# Patient Record
Sex: MALE | Race: WHITE | Employment: FULL TIME | URBAN - METROPOLITAN AREA
[De-identification: names, ages, dates, MRNs, and addresses within clinical notes are randomized per-mention and may not be internally consistent; named-entity substitution may affect disease eponyms.]

---

## 2017-01-17 ENCOUNTER — OFFICE VISIT (OUTPATIENT)
Dept: FAMILY MEDICINE CLINIC | Age: 42
End: 2017-01-17

## 2017-01-17 VITALS
OXYGEN SATURATION: 98 % | RESPIRATION RATE: 16 BRPM | TEMPERATURE: 97 F | BODY MASS INDEX: 29.7 KG/M2 | HEIGHT: 69 IN | SYSTOLIC BLOOD PRESSURE: 128 MMHG | WEIGHT: 200.5 LBS | DIASTOLIC BLOOD PRESSURE: 90 MMHG | HEART RATE: 73 BPM

## 2017-01-17 DIAGNOSIS — F41.0 PANIC ANXIETY SYNDROME: ICD-10-CM

## 2017-01-17 RX ORDER — ALPRAZOLAM 1 MG/1
TABLET ORAL
Qty: 30 TAB | Refills: 5 | Status: SHIPPED | OUTPATIENT
Start: 2017-01-17 | End: 2017-07-10 | Stop reason: SDUPTHER

## 2017-01-17 RX ORDER — BUSPIRONE HYDROCHLORIDE 30 MG/1
30 TABLET ORAL 2 TIMES DAILY
Qty: 60 TAB | Refills: 1 | Status: SHIPPED | OUTPATIENT
Start: 2017-01-17 | End: 2017-01-25 | Stop reason: SDUPTHER

## 2017-01-17 NOTE — PATIENT INSTRUCTIONS
TODAY, go to:   CHECK OUT    Please schedule the following appointments:  · Anxiety follow up with Dr. Damon Zimmerman in 8 weeks    _____________________     Today you were seen for:    continue xanax  Increase buspar as directed  _____________________     Review your health maintenance below. Make plans to return and address anything that is due or will be due soon. There are no preventive care reminders to display for this patient.

## 2017-01-17 NOTE — PROGRESS NOTES
Chief Complaint   Patient presents with    Medication Refill     Doing [address] Deepti Aguayo Anxiety is still a problem. 1. Have you been to the ER, urgent care clinic since your last visit? Hospitalized since your last visit? No    2. Have you seen or consulted any other health care providers outside of the [facility] since your last visit? Include any pap smears or colon screening. No     I have reviewed Health Maintenance with the patient and updated. Advance Care plan is on file.

## 2017-01-17 NOTE — PROGRESS NOTES
[address] Visit   Patient ID:   Arik Durán is a 35 y.o. male. Assessment/Plan:    Mehran Elizabeth was seen today for medication refill. Diagnoses and all orders for this visit:    Panic anxiety syndrome  Xanax refilled. -     ALPRAZolam (XANAX) 1 mg tablet; TAKE 1 TABLET BY MOUTH DAILY AS NEEDED FOR ANXIETY  -     busPIRone (BUSPAR) 30 mg tablet; Take 1 Tab by mouth two (2) times a day for 60 days  Anxiety is not adequately controlled with current plan    · Patient's plan currently includes: buspar. INCREASE to 30mg po bid. Xanax daily  · Functional improvements that justify chronic benzodiazepine medications: yes  · Treatment agreement was signed by pt and myself on: 1/17/17  PHQ -9 done: 1/17/176 Result: 7   ·  appropriate and last checked on: 1/17/17  · LABS:  Last Utox: due for Utox at next refill    Counselled pt on:  Patient health concerns. Patient was offered a choice/choices in the treatment plan today. Patient expresses understanding of the plan and agrees with recommendations. Patient Instructions   TODAY, go to:   CHECK OUT    Please schedule the following appointments:  · Anxiety follow up with Dr. Cruz Felder in 8 weeks    _____________________     Today you were seen for:    continue xanax  Increase buspar as directed  _____________________     Review your health maintenance below. Make plans to return and address anything that is due or will be due soon. There are no preventive care reminders to display for this patient. ?  Subjective:   HPI:  Arik Durán is a 35 y.o. male being seen for:   Chief Complaint   Patient presents with    Medication Refill     Doing [address] Deepti Aguayo Anxiety is still a problem. Anxiety  · Start buspar in Dec 2016, no better since then  · Due for xanax refill   · Work still the worst place  · Home the best place    Screening and Prevention Due:  There are no preventive care reminders to display for this patient.      Review of Systems  Otherwise, per HPI  Active Problem List:  Patient Active Problem List   Diagnosis Code    Panic anxiety syndrome F41.0    GERD (gastroesophageal reflux disease) K21.9    Pulmonary nodules/lesions, multiple R91.8    Melanoma of trunk (HCC) C43.59    Low back pain M54.5     ? Objective:     Visit Vitals    /90 (BP 1 Location: Left arm, BP Patient Position: Sitting)    Pulse 73    Temp 97 °F (36.1 °C) (Oral)    Resp 16    Ht 5' 9\" (1.753 m)    Wt 200 lb 8 oz (90.9 kg)    SpO2 98%    BMI 29.61 kg/m2     PHQ 2 / 9, over the last two weeks 1/17/2017   Little interest or pleasure in doing things Several days   Feeling down, depressed or hopeless Not at all   Total Score PHQ 2 1   Trouble falling or staying asleep, or sleeping too much Several days   Feeling tired or having little energy More than half the days   Poor appetite or overeating Not at all   Feeling bad about yourself - or that you are a failure or have let yourself or your family down Several days   Trouble concentrating on things such as school, work, reading or watching TV Several days   Moving or speaking so slowly that other people could have noticed; or the opposite being so fidgety that others notice Several days   Thoughts of being better off dead, or hurting yourself in some way Not at all   PHQ 9 Score 7     Physical Exam   Constitutional: He appears well-developed and well-nourished. No distress. Pulmonary/Chest: Effort normal.   Neurological: He is alert. Psychiatric: He has a normal mood and affect. His behavior is normal.     No Known Allergies  Prior to Admission medications    Medication Sig Start Date End Date Taking? Authorizing Provider   ALPRAZolam Valjevidya [place]) 1 mg tablet TAKE 1 TABLET BY MOUTH DAILY AS NEEDED FOR ANXIETY 1/17/17  Yes Kenyetta [place]. Marlena Voss MD   busPIRone (BUSPAR) 30 mg tablet Take 1 Tab by mouth two (2) times a day for 60 days. 1/17/17 3/18/17 Yes Kenyetta [place].  Marlena Voss MD   CARAFATE 100 mg/mL suspension Take 1 tsp by mouth as needed.  3/9/16  Yes Historical Provider   esomeprazole (NEXIUM) 40 mg capsule TAKE 1 CAPSULES BY MOUTH EVERY DAY 3/15/16  Yes Yohana Greer MD

## 2017-01-17 NOTE — MR AVS SNAPSHOT
Visit Information Date & Time Provider Department Dept. Phone Encounter #  
 1/17/2017  5:15 PM Bridgett Cooper. Octavio Lucio MD [facility] [phone] [id number] Upcoming Health Maintenance Date Due Pneumococcal 19-64 Highest Risk (1 of 3 - PCV13) 1/22/2017* DTaP/Tdap/Td series (1 - Tdap) 1/22/2017* INFLUENZA AGE 9 TO ADULT 1/22/2017* *Topic was postponed. The date shown is not the original due date. Allergies as of 1/17/2017  Review Complete On: 1/17/2017 By: Abiel Torres RN No Known Allergies Current Immunizations  Reviewed on 7/11/2016 Name Date Td 6/24/2004 Not reviewed this visit You Were Diagnosed With   
  
 Codes Comments Panic anxiety syndrome     ICD-10-CM: F41.0 ICD-9-CM: 300.01 Vitals BP Pulse Temp Resp Height(growth percentile) Weight(growth percentile) 128/90 (BP 1 Location: Left arm, BP Patient Position: Sitting) 73 97 °F (36.1 °C) (Oral) 16 5' 9\" (1.753 m) 200 lb 8 oz (90.9 kg) SpO2 BMI Smoking Status 98% 29.61 kg/m2 Former Smoker Vitals History BMI and BSA Data Body Mass Index Body Surface Area  
 29.61 kg/m 2 2.1 m 2 Preferred Pharmacy Pharmacy Name Phone [id number] [address] [phone] Your Updated Medication List  
  
   
This list is accurate as of: 1/17/17  6:14 PM.  Always use your most recent med list.  
  
  
  
  
 ALPRAZolam 1 mg tablet Commonly known as:  XANAX  
TAKE 1 TABLET BY MOUTH DAILY AS NEEDED FOR ANXIETY  
  
 busPIRone 30 mg tablet Commonly known as:  BUSPAR Take 1 Tab by mouth two (2) times a day for 60 days. CARAFATE 100 mg/mL suspension Generic drug:  sucralfate Take 1 tsp by mouth as needed. esomeprazole 40 mg capsule Commonly known as:  NexIUM  
TAKE 1 CAPSULES BY MOUTH EVERY DAY Prescriptions Printed Refills ALPRAZolam (XANAX) 1 mg tablet 5 Sig: TAKE 1 TABLET BY MOUTH DAILY AS NEEDED FOR ANXIETY Class: Print Prescriptions Sent to Pharmacy Refills  
 busPIRone (BUSPAR) 30 mg tablet 1 Sig: Take 1 Tab by mouth two (2) times a day for 60 days. Class: Normal  
 Pharmacy: [facility]/pharm[facility] #[id number] [place] [address] #: [phone] Route: Oral  
  
Patient Instructions TODAY, go to: CHECK OUT Please schedule the following appointments: · Anxiety follow up with Dr. Emerson Gloria in 8 weeks 
 
_____________________ Today you were seen for: 
 
continue xanax Increase buspar as directed 
_____________________ Review your health maintenance below. Make plans to return and address anything that is due or will be due soon. There are no preventive care reminders to display for this patient. Introducing [facility] & HEALTH SERVICES! Dear Joe Singh: 
Thank you for requesting a [employer] account. Our records indicate that you already have an active [employer] account. You can access your account anytime at [email or web address] Did you know that you can access your hospital and ER discharge instructions at any time in [employer]? You can also review all of your test results from your hospital stay or ER visit. Additional Information If you have questions, please visit the Frequently Asked Questions section of the [employer] website at [email or web address] Remember, [employer] is NOT to be used for urgent needs. For medical emergencies, dial 911. Now available from your iPhone and Android! Please provide this summary of care documentation to your next provider. Your primary care clinician is listed as Tomer Hubbard. Emerson Gloria. If you have any questions after today's visit, please call [phone].

## 2017-03-09 RX ORDER — ESOMEPRAZOLE MAGNESIUM 40 MG/1
CAPSULE, DELAYED RELEASE ORAL
Qty: 90 CAP | Refills: 0 | Status: SHIPPED | OUTPATIENT
Start: 2017-03-09 | End: 2017-07-05 | Stop reason: SDUPTHER

## 2017-06-20 ENCOUNTER — OFFICE VISIT (OUTPATIENT)
Dept: FAMILY MEDICINE CLINIC | Age: 42
End: 2017-06-20

## 2017-06-20 VITALS
HEIGHT: 69 IN | OXYGEN SATURATION: 99 % | RESPIRATION RATE: 18 BRPM | SYSTOLIC BLOOD PRESSURE: 150 MMHG | TEMPERATURE: 97.6 F | WEIGHT: 202 LBS | BODY MASS INDEX: 29.92 KG/M2 | DIASTOLIC BLOOD PRESSURE: 99 MMHG | HEART RATE: 87 BPM

## 2017-06-20 DIAGNOSIS — F41.0 PANIC ANXIETY SYNDROME: Primary | ICD-10-CM

## 2017-06-20 RX ORDER — MIRTAZAPINE 7.5 MG/1
7.5 TABLET, FILM COATED ORAL
Qty: 30 TAB | Refills: 1 | Status: SHIPPED | OUTPATIENT
Start: 2017-06-20 | End: 2017-08-29 | Stop reason: SDUPTHER

## 2017-06-20 NOTE — MR AVS SNAPSHOT
Visit Information Date & Time Provider Department Dept. Phone Encounter #  
 6/20/2017  4:20 PM [place] Records Kimberly Berger MD [facility] [phone] [id number] Upcoming Health Maintenance Date Due Pneumococcal 19-64 Highest Risk (1 of 3 - PCV13) 12/6/1992 DTaP/Tdap/Td series (1 - Tdap) 6/25/2004 INFLUENZA AGE 9 TO ADULT 8/1/2017 Allergies as of 6/20/2017  Review Complete On: 6/20/2017 By: Rita Silva LPN No Known Allergies Current Immunizations  Reviewed on 7/11/2016 Name Date Td 6/24/2004 Not reviewed this visit You Were Diagnosed With   
  
 Codes Comments Panic anxiety syndrome    -  Primary ICD-10-CM: F41.0 ICD-9-CM: 300.01 Vitals BP Pulse Temp Resp Height(growth percentile) Weight(growth percentile) (!) 150/99 (BP 1 Location: Left arm, BP Patient Position: Sitting) 87 97.6 °F (36.4 °C) (Oral) 18 5' 9\" (1.753 m) 202 lb (91.6 kg) SpO2 BMI Smoking Status 99% 29.83 kg/m2 Former Smoker BMI and BSA Data Body Mass Index Body Surface Area  
 29.83 kg/m 2 2.11 m 2 Preferred Pharmacy [facility]acy Name Phone [id number] [address] [phone] Your Updated Medication List  
  
   
This list is accurate as of: 6/20/17  5:31 PM.  Always use your most recent med list.  
  
  
  
  
 ALPRAZolam 1 mg tablet Commonly known as:  XANAX  
TAKE 1 TABLET BY MOUTH DAILY AS NEEDED FOR ANXIETY CARAFATE 100 mg/mL suspension Generic drug:  sucralfate Take 1 tsp by mouth as needed. esomeprazole 40 mg capsule Commonly known as:  NexIUM  
TAKE 1 CAPSULES BY MOUTH EVERY DAY  
  
 mirtazapine 7.5 mg tablet Commonly known as:  Sharyle Meadows Take 1 Tab by mouth nightly. Take at night. May make tired. Prescriptions Sent to Pharmacy  Refills  
 mirtazapine (REMERON) 7.5 mg tablet 1  
 Sig: Take 1 Tab by mouth nightly. Take at night. May make tired. Class: Normal  
 Pharmacy: [id number] [place], [address] [facility] Ph #: [phone] Route: Oral  
  
We Performed the Following [address] MONITORING [[id number] Custom] Patient Instructions Teddy Clark TODAY, please go to: CHECK OUT Please schedule the following appointments at CHECK OUT: 
· Anxiety and FMLA paper work with Dr. Munira Mcdaniel in 3-4 weeks 
 
_____________________ Today's Plan: 
· Stop buspar · Start remeron · Continue xanax when needed · Bring pill bottle for xanax refill · Ask your HR about FMLA paper work for intermittent leave.  
_____________________ Review your health maintenance below. Make plans to return and address anything that is due or will be due soon. Health Maintenance Due Topic Date Due  Pneumococcal Vaccine (1 of 3 - PCV13) 12/06/1992  
 DTaP/Tdap/Td  (1 - Tdap) 06/25/2004  
 
 
 
_____________________ Are you stressed out? Overwhelmed? In pain? Feeling disconnected? Consider MINDFULNESS. .. 
[email or web address] 
_____________________ If you need to get your labs done at [facility], visit this site to find a convenient location: [facility] HEALTH SERVICES! Dear Kingston Diallo: 
Thank you for requesting a [employer] account. Our records indicate that you already have an active [employer] account. You can access your account anytime at [email or web address] Did you know that you can access your hospital and ER discharge instructions at any time in [employer]? You can also review all of your test results from your hospital stay or ER visit. Additional Information If you have questions, please visit the Frequently Asked Questions section of the [employer] website at [email or web address] Textilest/. Remember, [email or web address] is NOT to be used for urgent needs. For medical emergencies, dial 911. Now available from your iPhone and Android! Please provide this summary of care documentation to your next provider. Your primary care clinician is listed as Anna Plaza. Priscilla Crystal. If you have any questions after today's visit, please call [phone].

## 2017-06-20 NOTE — PATIENT INSTRUCTIONS
.. TODAY, please go to:   CHECK OUT     Please schedule the following appointments at CHECK OUT:  · Anxiety and FMLA paper work with Dr. Polo Rodriguez in 3-4 weeks    _____________________     Today's Plan:  · Stop buspar  · Start remeron  · Continue xanax when needed  · Bring pill bottle for xanax refill  · Ask your HR about FMLA paper work for intermittent leave.   _____________________     Review your health maintenance below. Make plans to return and address anything that is due or will be due soon. Health Maintenance Due   Topic Date Due    Pneumococcal Vaccine (1 of 3 - PCV13) 12/06/1992    DTaP/Tdap/Td  (1 - Tdap) 06/25/2004         _____________________     Are you stressed out? Overwhelmed? In pain? Feeling disconnected? Consider MINDFULNESS. ..  [email or web address]  _____________________     If you need to get your labs done at [facility], visit this site to find a convenient location: [email or web address]

## 2017-06-20 NOTE — PROGRESS NOTES
Chief Complaint   Patient presents with    Anxiety     discuss anxiety, and medication      1. Have you been to the ER, urgent care clinic since your last visit? Hospitalized since your last visit? No     2. Have you seen or consulted any other health care providers outside of the [address] [place] [address] since your last visit? Include any pap smears or colon screening. No     The patient was counseled on the dangers of tobacco use, and was advised never to start again. Reviewed strategies to maximize success, including never to start again. Health Maintenance Due   Topic Date Due    Pneumococcal 19-64 Highest Risk (1 of 3 - PCV13) Discussed with patient today and advised to follow up. Patient declines. 12/06/1992    DTaP/Tdap/Td series (1 - Tdap) Discussed with patient today and advised to follow up. Patient declines. 06/25/2004     ACP is not on file, advised to return.

## 2017-06-20 NOTE — PROGRESS NOTES
[address] Visit   Patient ID:   Mariano Temple is a 35 y.o. male. Assessment/Plan:    Shefali Peralta was seen today for anxiety. Diagnoses and all orders for this visit:    Panic anxiety syndrome  Increased anxiety with buspar. Trial remeron, start at night. If not effective consider TCA or MAOI. Discussed prn propranolol, but would have to know when he is going to stressed and sometimes this is unpredictable. Comments:  has not responded to SSRIs, SNRIs, buspar. currently needing chronic xanax. Orders:  -     COMPLIANCE DRUG SCREEN/PRESCRIPTION MONITORING  -     mirtazapine (REMERON) 7.5 mg tablet; Take 1 Tab by mouth nightly. Take at night. May make tired. Next visit: utox, fmla papers, xanax refill    Counselled pt on:  Patient health concerns, plan as outlined in patient instructions and above. Patient was offered a choice/choices in the treatment plan today. Patient expresses understanding of the plan and agrees with recommendations. Patient Instructions     . Belinda Rivera TODAY, please go to:   CHECK OUT     Please schedule the following appointments at CHECK OUT:  · Anxiety and FMLA paper work with Dr. Garth Pagan in 3-4 weeks    _____________________     Today's Plan:  · Stop buspar  · Start remeron  · Continue xanax when needed  · Bring pill bottle for xanax refill  · Ask your HR about FMLA paper work for intermittent leave.   _____________________     Review your health maintenance below. Make plans to return and address anything that is due or will be due soon. Health Maintenance Due   Topic Date Due    Pneumococcal Vaccine (1 of 3 - PCV13) 12/06/1992    DTaP/Tdap/Td  (1 - Tdap) 06/25/2004         _____________________     Are you stressed out? Overwhelmed? In pain? Feeling disconnected? Consider MINDFULNESS. ..  [email or web address]  _____________________     If you need to get your labs done at [facility], visit this site to find a convenient location: [email or web address]      Subjective:   HPI:  Hilda Yañez is a 35 y.o. male being seen for:   Chief Complaint   Patient presents with    Anxiety     discuss anxiety, and medication        Anxiety  · The longer he took buspar, the worse he got  · Couldn't go anywhere by himself  · Last took a couple weeks ago, not just a little better. · Xanax, using daily for morning meetings 4 days a week. · Worried that he he going to loose job, has 6 kids  · This has come and go over the years     Review of Systems  Otherwise as noted in HPI    Active Problem List:  Patient Active Problem List   Diagnosis Code    Panic anxiety syndrome F41.0    GERD (gastroesophageal reflux disease) K21.9    Pulmonary nodules/lesions, multiple R91.8    Melanoma of trunk (HCC) C43.59    Low back pain M54.5     ?   Objective:     Visit Vitals    BP (!) 150/99 (BP 1 Location: Left arm, BP Patient Position: Sitting)    Pulse 87    Temp 97.6 °F (36.4 °C) (Oral)    Resp 18    Ht 5' 9\" (1.753 m)    Wt 202 lb (91.6 kg)    SpO2 99%    BMI 29.83 kg/m2     Wt Readings from Last 3 Encounters:   06/20/17 202 lb (91.6 kg)   01/17/17 200 lb 8 oz (90.9 kg)   11/28/16 203 lb 6.4 oz (92.3 kg)     BP Readings from Last 3 Encounters:   06/20/17 (!) 150/99   01/17/17 128/90   11/28/16 (!) 154/99     PHQ over the last two weeks 6/20/2017   Little interest or pleasure in doing things Not at all   Feeling down, depressed or hopeless Not at all   Total Score PHQ 2 0   Trouble falling or staying asleep, or sleeping too much -   Feeling tired or having little energy -   Poor appetite or overeating -   Feeling bad about yourself - or that you are a failure or have let yourself or your family down -   Trouble concentrating on things such as school, work, reading or watching TV -   Moving or speaking so slowly that other people could have noticed; or the opposite being so fidgety that others notice -   Thoughts of being better off dead, or hurting yourself in some way -   PHQ 9 Score -       Physical Exam   Constitutional: He appears well-developed and well-nourished. No distress. Pulmonary/Chest: Effort normal. No respiratory distress. Neurological: He is alert. Psychiatric: His speech is normal and behavior is normal. His mood appears anxious. No Known Allergies  Prior to Admission medications    Medication Sig Start Date End Date Taking? Authorizing Provider   mirtazapine (REMERON) 7.5 mg tablet Take 1 Tab by mouth nightly. Take at night. May make tired. 6/20/17  Yes Meagan Hicks. Munira Mcdaniel MD   esomeprazole (NEXIUM) 40 mg capsule TAKE 1 CAPSULES BY MOUTH EVERY DAY 3/9/17  Yes Meagan Hicks. Munira Mcdaniel MD   ALPRAZolam Fredy Blum) 1 mg tablet TAKE 1 TABLET BY MOUTH DAILY AS NEEDED FOR ANXIETY 1/17/17  Yes Meagan Hicks. Munira Mcdaniel MD   CARAFATE 100 mg/mL suspension Take 1 tsp by mouth as needed.  3/9/16  Yes Historical Provider

## 2017-07-05 RX ORDER — ESOMEPRAZOLE MAGNESIUM 40 MG/1
CAPSULE, DELAYED RELEASE ORAL
Qty: 90 CAP | Refills: 0 | Status: SHIPPED | OUTPATIENT
Start: 2017-07-05 | End: 2017-11-06 | Stop reason: SDUPTHER

## 2017-07-10 ENCOUNTER — OFFICE VISIT (OUTPATIENT)
Dept: FAMILY MEDICINE CLINIC | Age: 42
End: 2017-07-10

## 2017-07-10 VITALS
WEIGHT: 198 LBS | TEMPERATURE: 98.7 F | SYSTOLIC BLOOD PRESSURE: 130 MMHG | BODY MASS INDEX: 29.33 KG/M2 | DIASTOLIC BLOOD PRESSURE: 90 MMHG | HEIGHT: 69 IN | RESPIRATION RATE: 20 BRPM | OXYGEN SATURATION: 100 % | HEART RATE: 78 BPM

## 2017-07-10 DIAGNOSIS — F41.0 PANIC ANXIETY SYNDROME: Primary | ICD-10-CM

## 2017-07-10 RX ORDER — ALPRAZOLAM 1 MG/1
TABLET ORAL
Qty: 30 TAB | Refills: 5 | Status: SHIPPED | OUTPATIENT
Start: 2017-07-10 | End: 2018-01-15 | Stop reason: SDUPTHER

## 2017-07-10 NOTE — PROGRESS NOTES
.. [address] Visit   Patient ID:   Abisai Lao is a 35 y.o. male. Assessment/Plan:    Diagnoses and all orders for this visit:    1. Panic anxiety syndrome  -     ALPRAZolam (XANAX) 1 mg tablet; TAKE 1 TABLET BY MOUTH DAILY AS NEEDED FOR ANXIETY     checked and appropriate today. He has an updated treatment agreement. He understands r/o BDZ use. Use is warranted for improved functioning at this time. FMLA completed d/t panic attacks    Counselled pt on:  Patient health concerns, plan as outlined in patient instructions and above. Patient was offered a choice/choices in the treatment plan today. Patient expresses understanding of the plan and agrees with recommendations. Patient Instructions     TODAY, please go to:   CHECK OUT     Please schedule the following appointments at [facility] OUT:  · Anxiety follow up in 3 months with Dr. Mervat Perry    _____________________     Today's Plan:  · Continue remeron  · Let us know if we need to fax your papers. We have retained a copy. _____________________     Review your health maintenance below. Make plans to return and address anything that is due or will be due soon. Health Maintenance Due   Topic Date Due    Pneumococcal Vaccine (1 of 3 - PCV13) 12/06/1992    DTaP/Tdap/Td  (1 - Tdap) 06/25/2004         _____________________     Are you stressed out? Overwhelmed? In pain? Feeling disconnected? Consider MINDFULNESS. ..  [email or web address]  _____________________     If you need to get your labs done at [facility], visit this site to find a convenient location: [email or web address]      Subjective:   HPI:  Abisai Lao is a 35 y.o. male being seen for:   Chief Complaint   Patient presents with    Medication Evaluation     alprazolam last controlled substance contract signed on 1/17/17     Anxiety  · Started remeron about 2 weeks ago, no different  · Worse anxiety today and being in the office, getting BP checked     Review of Systems  Otherwise as noted in HPI    Active Problem List:  Patient Active Problem List   Diagnosis Code    Panic anxiety syndrome F41.0    GERD (gastroesophageal reflux disease) K21.9    Pulmonary nodules/lesions, multiple R91.8    Melanoma of trunk (HCC) C43.59    Low back pain M54.5     ? Objective:     Visit Vitals    /90    Pulse 78    Temp 98.7 °F (37.1 °C) (Oral)    Resp 20    Ht 5' 9\" (1.753 m)    Wt 198 lb (89.8 kg)    SpO2 100%    BMI 29.24 kg/m2     Wt Readings from Last 3 Encounters:   07/10/17 198 lb (89.8 kg)   06/20/17 202 lb (91.6 kg)   01/17/17 200 lb 8 oz (90.9 kg)     BP Readings from Last 3 Encounters:   07/10/17 130/90   06/20/17 (!) 150/99   01/17/17 128/90     PHQ over the last two weeks 7/10/2017   Little interest or pleasure in doing things Not at all   Feeling down, depressed or hopeless Not at all   Total Score PHQ 2 0   Trouble falling or staying asleep, or sleeping too much -   Feeling tired or having little energy -   Poor appetite or overeating -   Feeling bad about yourself - or that you are a failure or have let yourself or your family down -   Trouble concentrating on things such as school, work, reading or watching TV -   Moving or speaking so slowly that other people could have noticed; or the opposite being so fidgety that others notice -   Thoughts of being better off dead, or hurting yourself in some way -   PHQ 9 Score -         Physical Exam   Constitutional: He appears well-developed and well-nourished. No distress. Pulmonary/Chest: Effort normal. No respiratory distress. Neurological: He is alert. Psychiatric: He has a normal mood and affect. His behavior is normal.     No Known Allergies  Prior to Admission medications    Medication Sig Start Date End Date Taking?  Authorizing Provider   Blue Moore) 1 mg tablet TAKE 1 TABLET BY MOUTH DAILY AS NEEDED FOR ANXIETY 7/10/17  Yes Gaston Bowers MD   esomeprazole (NEXIUM) 40 mg capsule TAKE 1 CAPSULES BY MOUTH EVERY DAY 7/5/17  Yes Jenniffer Irvin. Gerhard Bowers MD   mirtazapine (REMERON) 7.5 mg tablet Take 1 Tab by mouth nightly. Take at night. May make tired. 6/20/17  Yes Jenniffer Irvin. Gerhard Bowers MD   CARAFATE 100 mg/mL suspension Take 1 tsp by mouth as needed.  3/9/16  Yes Historical Provider

## 2017-07-10 NOTE — MR AVS SNAPSHOT
Visit Information Date & Time Provider Department Dept. Phone Encounter #  
 7/10/2017  4:40 PM Pedro Landon Vieyra MD [facility] [phone] [id number] Upcoming Health Maintenance Date Due Pneumococcal 19-64 Highest Risk (1 of 3 - PCV13) 12/6/1992 DTaP/Tdap/Td series (1 - Tdap) 6/25/2004 INFLUENZA AGE 9 TO ADULT 8/1/2017 Allergies as of 7/10/2017  Review Complete On: 7/10/2017 By: Baldemar Bearden No Known Allergies Current Immunizations  Reviewed on 7/11/2016 Name Date Td 6/24/2004 Not reviewed this visit Vitals BP Pulse Temp Resp Height(growth percentile) Weight(growth percentile) (!) 156/104 (BP 1 Location: Left arm, BP Patient Position: Sitting) (!) 118 98.7 °F (37.1 °C) (Oral) 20 5' 9\" (1.753 m) 198 lb (89.8 kg) SpO2 BMI Smoking Status 100% 29.24 kg/m2 Former Smoker BMI and BSA Data Body Mass Index Body Surface Area  
 29.24 kg/m 2 2.09 m 2 Preferred Pharmacy Pharmacy Name Phone [id number] [address] [place] [address] [phone] Your Updated Medication List  
  
   
This list is accurate as of: 7/10/17  5:42 PM.  Always use your most recent med list.  
  
  
  
  
 ALPRAZolam 1 mg tablet Commonly known as:  XANAX  
TAKE 1 TABLET BY MOUTH DAILY AS NEEDED FOR ANXIETY CARAFATE 100 mg/mL suspension Generic drug:  sucralfate Take 1 tsp by mouth as needed. esomeprazole 40 mg capsule Commonly known as:  NEXIUM  
TAKE 1 CAPSULES BY MOUTH EVERY DAY  
  
 mirtazapine 7.5 mg tablet Commonly known as:  Emmy Colome Take 1 Tab by mouth nightly. Take at night. May make tired. Patient Instructions TODAY, please go to: CHECK OUT Please schedule the following appointments at [facility] OUT: 
· Anxiety follow up in 3 months with Dr. Cami Vieyra 
 
_____________________ Today's Plan: 
· Continue remeron · Let us know if we need to fax your papers. We have retained a copy. _____________________ Review your health maintenance below. Make plans to return and address anything that is due or will be due soon. Health Maintenance Due Topic Date Due  Pneumococcal Vaccine (1 of 3 - PCV13) 12/06/1992  
 DTaP/Tdap/Td  (1 - Tdap) 06/25/2004  
 
 
 
_____________________ Are you stressed out? Overwhelmed? In pain? Feeling disconnected? Consider MINDFULNESS. .. 
[email or web address] 
_____________________ If you need to get your labs done at [facility], visit this site to find a convenient location: [facility]. Introducing [address]! Dear Viktor Herrera: 
Thank you for requesting a [employer] account. Our records indicate that you already have an active [employer] account. You can access your account anytime at [email or web address] Did you know that you can access your hospital and ER discharge instructions at any time in [employer]? You can also review all of your test results from your hospital stay or ER visit. Additional Information If you have questions, please visit the Frequently Asked Questions section of the [employer] website at [email or web address] Remember, [employer] is NOT to be used for urgent needs. For medical emergencies, dial 911. Now available from your iPhone and Android! Please provide this summary of care documentation to your next provider. Your primary care clinician is listed as Virgie Neri. If you have any questions after today's visit, please call [phone].

## 2017-07-10 NOTE — PATIENT INSTRUCTIONS
TODAY, please go to:   CHECK OUT     Please schedule the following appointments at [facility] OUT:  · Anxiety follow up in 3 months with Dr. Charels Zhang    _____________________     Today's Plan:  · Continue remeron  · Let us know if we need to fax your papers. We have retained a copy. _____________________     Review your health maintenance below. Make plans to return and address anything that is due or will be due soon. Health Maintenance Due   Topic Date Due    Pneumococcal Vaccine (1 of 3 - PCV13) 12/06/1992    DTaP/Tdap/Td  (1 - Tdap) 06/25/2004         _____________________     Are you stressed out? Overwhelmed? In pain? Feeling disconnected? Consider MINDFULNESS. ..  [email or web address]  _____________________     If you need to get your labs done at [facility], visit this site to find a convenient location: [email or web address]

## 2017-07-10 NOTE — PROGRESS NOTES
Blood pressure rechecked and it was better at 130/90 in the left arm with manual cuff--heart rate down to 78

## 2017-07-10 NOTE — PROGRESS NOTES
Chief Complaint   Patient presents with    Medication Evaluation     alprazolam last controlled substance contract signed on 17     Discuss FMLA papers    PILL COUNT  Today's Date: 7/10/2017  Medication name: alprazolam  Pill strength: 1 mg  Date prescription filled: 17  # of pills prescribed: 30  Last dose of medication taken, per patient: 17  # of pills remainin  Counted in front of patient. yes  Medication returned to patient. yes    Health Maintenance Due   Topic Date Due    Pneumococcal 19-64 Highest Risk (1 of 3 - PCV13) 1992    DTaP/Tdap/Td series (1 - Tdap) 2004     Coordination of Care Questions    1. Have you been to the ER, urgent care clinic since your last visit? No       Hospitalized since your last visit? No    2. Have you seen or consulted any other health care providers outside of the [address] [place] [address] since your last visit? Include any pap smears or colon screening.  No

## 2017-08-29 DIAGNOSIS — F41.0 PANIC ANXIETY SYNDROME: ICD-10-CM

## 2017-09-06 RX ORDER — MIRTAZAPINE 7.5 MG/1
TABLET, FILM COATED ORAL
Qty: 30 TAB | Refills: 5 | Status: SHIPPED | OUTPATIENT
Start: 2017-09-06 | End: 2018-01-15

## 2017-10-17 ENCOUNTER — OFFICE VISIT (OUTPATIENT)
Dept: FAMILY MEDICINE CLINIC | Age: 42
End: 2017-10-17

## 2017-10-17 VITALS
HEIGHT: 69 IN | OXYGEN SATURATION: 100 % | HEART RATE: 88 BPM | SYSTOLIC BLOOD PRESSURE: 136 MMHG | DIASTOLIC BLOOD PRESSURE: 96 MMHG | RESPIRATION RATE: 16 BRPM | WEIGHT: 193 LBS | BODY MASS INDEX: 28.58 KG/M2 | TEMPERATURE: 97.2 F

## 2017-10-17 DIAGNOSIS — Z00.00 LABORATORY EXAM ORDERED AS PART OF ROUTINE GENERAL MEDICAL EXAMINATION: ICD-10-CM

## 2017-10-17 DIAGNOSIS — R36.1 HEMATOSPERMIA: Primary | ICD-10-CM

## 2017-10-17 DIAGNOSIS — Z00.8 ENCOUNTER FOR BIOMETRIC SCREENING: ICD-10-CM

## 2017-10-17 LAB
BILIRUB UR QL STRIP: NEGATIVE
GLUCOSE UR-MCNC: NEGATIVE MG/DL
KETONES P FAST UR STRIP-MCNC: NEGATIVE MG/DL
PH UR STRIP: 7 [PH] (ref 4.6–8)
PROT UR QL STRIP: NEGATIVE MG/DL
SP GR UR STRIP: 1.02 (ref 1–1.03)
UA UROBILINOGEN AMB POC: NORMAL (ref 0.2–1)
URINALYSIS CLARITY POC: CLEAR
URINALYSIS COLOR POC: NORMAL
URINE BLOOD POC: NORMAL
URINE LEUKOCYTES POC: NEGATIVE
URINE NITRITES POC: NEGATIVE

## 2017-10-17 NOTE — PROGRESS NOTES
Chief Complaint   Patient presents with    Form Completion     Biometric Screening form      1. Have you been to the ER, urgent care clinic since your last visit? Hospitalized since your last visit? No     2. Have you seen or consulted any other health care providers outside of the [address] [place] [address] since your last visit? Include any pap smears or colon screening. No     The patient was counseled on the dangers of tobacco use, and was advised never to start. Reviewed strategies to maximize success, including never to start. Health Maintenance Due   Topic Date Due    Pneumococcal 19-64 Highest Risk (1 of 3 - PCV13) Discussed with patient today and advised to follow up.    12/06/1992    DTaP/Tdap/Td series (1 - Tdap) Discussed with patient today and advised to follow up.    06/25/2004    INFLUENZA AGE 9 TO ADULT Discussed with patient today and advised to follow up. Patient declines. 08/01/2017     ACP is not on file, advised to return.

## 2017-10-17 NOTE — PROGRESS NOTES
[address] [place] [address] Visit   10/17/2017  Patient ID: Arik Durán is a 35 y.o. male. Assessment/Plan:    Diagnoses and all orders for this visit:    1. Hematospermia  Test as ordered. If negative and sx are unchanged treat for possible prostatitis vs urology referral  -     CULTURE, URINE  -     AMB POC URINALYSIS DIP STICK MANUAL W/O MICRO  -     CHLAMYDIA/GC PCR  -     CT/NG/T.VAGINALIS AMPLIFICATION  -     AMB POC URINALYSIS DIP STICK AUTO W/O MICRO    2. Encounter for biometric screening    3. Laboratory exam ordered as part of routine general medical examination  -     CBC W/O DIFF  -     METABOLIC PANEL, COMPREHENSIVE  -     HEMOGLOBIN A1C WITH EAG  -     LIPID PANEL    Other orders  -     CVD REPORT        Counselled pt on Patient health concerns and plans. Patient was offered a choice/choices in the treatment plan today. Reviewed return precautions as appropriate. Patient expresses understanding of the plan and agrees with recommendations. See patient instructions for more. Patient Instructions   TODAY, please go to:     CHECK OUT    LAB    Please schedule the following appointments at [address] [place] [address]:  · Complete Physical Exam and lab follow up with Dr. Cruz Felder in [facility]:  - likely benign    - will wait for urine tests and then update you. The tests can take several days to a week. - let me know if symptoms are worsening         Subjective:   HPI:  Arik Durán is a 35 y.o. male being seen for:   Chief Complaint   Patient presents with    Form Completion     Biometric Screening form        · blood in semen  · Started about a week ago  · No pain with urination, some light pressure. · After using bathroom, some clots in urine  · Urine more yellow, still drinking same amount of water. · Hematospermia notes in [address] from 3/2009. He has an old empty bottle of doxycycline with him that he was prescribed in 2004 when this happened also. No OSR available.  Recalls seeing a specialist at that time. · Needs biometric form done     Review of Systems  Otherwise as noted in HPI  ? Objective:     Visit Vitals    BP (!) 136/96 (BP 1 Location: Right arm, BP Patient Position: Sitting)    Pulse 88    Temp 97.2 °F (36.2 °C) (Oral)    Resp 16    Ht 5' 9\" (1.753 m)    Wt 193 lb (87.5 kg)    SpO2 100%    BMI 28.5 kg/m2     Wt Readings from Last 3 Encounters:   10/17/17 193 lb (87.5 kg)   07/10/17 198 lb (89.8 kg)   06/20/17 202 lb (91.6 kg)     BP Readings from Last 3 Encounters:   10/17/17 (!) 136/96   07/10/17 130/90   06/20/17 (!) 150/99     PHQ over the last two weeks 10/17/2017   Little interest or pleasure in doing things Not at all   Feeling down, depressed or hopeless Not at all   Total Score PHQ 2 0   Trouble falling or staying asleep, or sleeping too much -   Feeling tired or having little energy -   Poor appetite or overeating -   Feeling bad about yourself - or that you are a failure or have let yourself or your family down -   Trouble concentrating on things such as school, work, reading or watching TV -   Moving or speaking so slowly that other people could have noticed; or the opposite being so fidgety that others notice -   Thoughts of being better off dead, or hurting yourself in some way -   PHQ 9 Score -     Physical Exam   Constitutional: He appears well-developed and well-nourished. No distress. Pulmonary/Chest: Effort normal. No respiratory distress. Genitourinary: Prostate normal. Prostate is not enlarged and not tender. Right testis shows no mass, no swelling and no tenderness. Cremasteric reflex is not absent on the right side. Left testis shows no mass, no swelling and no tenderness. Cremasteric reflex is not absent on the left side. Neurological: He is alert. Psychiatric: He has a normal mood and affect. His behavior is normal.       No Known Allergies  Prior to Admission medications    Medication Sig Start Date End Date Taking?  Authorizing Provider   mirtazapine (REMERON) 7.5 mg tablet TAKE 1 TAB BY MOUTH NIGHTLY. TAKE AT NIGHT. MAY MAKE TIRED. 9/6/17  Yes Ariel Olsen. Lavinia Kehr, MD   ALPRAZolam Ed Jetty) 1 mg tablet TAKE 1 TABLET BY MOUTH DAILY AS NEEDED FOR ANXIETY 7/10/17  Yes Ariel Olsen. Lavinia Kehr, MD   esomeprazole (NEXIUM) 40 mg capsule TAKE 1 CAPSULES BY MOUTH EVERY DAY 7/5/17  Yes Ariel Olsen. Lavinia Kehr, MD   CARAFATE 100 mg/mL suspension Take 1 tsp by mouth as needed.  3/9/16   Historical Provider     Patient Active Problem List   Diagnosis Code    Panic anxiety syndrome F41.0    GERD (gastroesophageal reflux disease) K21.9    Pulmonary nodules/lesions, multiple R91.8    Melanoma of trunk (HCC) C43.59    Low back pain M54.5

## 2017-10-17 NOTE — MR AVS SNAPSHOT
Visit Information Date & Time Provider Department Dept. Phone Encounter #  
 10/17/2017 10:20 AM Radha Brennan. Nita Calixto MD [facility] [phone] [id number] Upcoming Health Maintenance Date Due Pneumococcal 19-64 Highest Risk (1 of 3 - PCV13) 12/6/1992 DTaP/Tdap/Td series (1 - Tdap) 6/25/2004 Allergies as of 10/17/2017  Review Complete On: 10/17/2017 By: Sterling Seo LPN No Known Allergies Current Immunizations  Reviewed on 7/11/2016 Name Date Td 6/24/2004 Not reviewed this visit You Were Diagnosed With   
  
 Codes Comments Hematospermia    -  Primary ICD-10-CM: R36.1 ICD-9-CM: 608.82 Encounter for biometric screening     ICD-10-CM: Z01.89 ICD-9-CM: V72.85 Laboratory exam ordered as part of routine general medical examination     ICD-10-CM: Z00.00 ICD-9-CM: V72.62 Vitals BP Pulse Temp Resp Height(growth percentile) Weight(growth percentile) (!) 136/96 (BP 1 Location: Right arm, BP Patient Position: Sitting) 88 97.2 °F (36.2 °C) (Oral) 16 5' 9\" (1.753 m) 193 lb (87.5 kg) SpO2 BMI Smoking Status 100% 28.5 kg/m2 Former Smoker Vitals History BMI and BSA Data Body Mass Index Body Surface Area 28.5 kg/m 2 2.06 m 2 Preferred Pharmacy Pharmacy Name Phone [facility]/PHARMACY #[id number] [phone] Your Updated Medication List  
  
   
This list is accurate as of: 10/17/17 11:19 AM.  Always use your most recent med list.  
  
  
  
  
 ALPRAZolam 1 mg tablet Commonly known as:  XANAX  
TAKE 1 TABLET BY MOUTH DAILY AS NEEDED FOR ANXIETY CARAFATE 100 mg/mL suspension Generic drug:  sucralfate Take 1 tsp by mouth as needed. esomeprazole 40 mg capsule Commonly known as:  NEXIUM  
TAKE 1 CAPSULES BY MOUTH EVERY DAY  
  
 mirtazapine 7.5 mg tablet Commonly known as:  Milady Shan  
 TAKE 1 TAB BY MOUTH NIGHTLY. TAKE AT NIGHT. MAY MAKE TIRED. We Performed the Following AMB POC URINALYSIS DIP STICK MANUAL W/O MICRO [02777 CPT(R)] CBC W/O DIFF [75866 CPT(R)] CHLAMYDIA/GC PCR [92210 CPT(R)] CULTURE, URINE [id number] CPT(R)] HEMOGLOBIN A1C WITH EAG [70082 CPT(R)] LIPID PANEL [73960 CPT(R)] METABOLIC PANEL, COMPREHENSIVE [60339 CPT(R)] To-Do List   
 10/17/2017 Lab:  T VAGINALIS AMPLIFICATION Patient Instructions TODAY, please go to: CHECK OUT  
 LAB Please schedule the following appointments at [address] [place] [address]: 
· Complete Physical Exam and lab follow up with Dr. Maria L Hobbs in [facility] Today's Plan: 
- likely benign  
 - will wait for urine tests and then update you. The tests can take several days to a week. - let me know if symptoms are worsening Introducing [facility]! Dear Yaron Blake: 
Thank you for requesting a [employer] account. Our records indicate that you already have an active [employer] account. You can access your account anytime at [email or web address] Did you know that you can access your hospital and ER discharge instructions at any time in [employer]? You can also review all of your test results from your hospital stay or ER visit. Additional Information If you have questions, please visit the Frequently Asked Questions section of the [employer] website at [email or web address] Remember, [employer] is NOT to be used for urgent needs. For medical emergencies, dial 911. Now available from your iPhone and Android! Please provide this summary of care documentation to your next provider. Your primary care clinician is listed as Kimber Hidalgo. If you have any questions after today's visit, please call [phone].

## 2017-10-18 LAB
ALBUMIN SERPL-MCNC: 4.4 G/DL (ref 3.5–5.5)
ALBUMIN/GLOB SERPL: 2 {RATIO} (ref 1.2–2.2)
ALP SERPL-CCNC: 71 IU/L (ref 39–117)
ALT SERPL-CCNC: 65 IU/L (ref 0–44)
AST SERPL-CCNC: 35 IU/L (ref 0–40)
BACTERIA UR CULT: NO GROWTH
BILIRUB SERPL-MCNC: 0.5 MG/DL (ref 0–1.2)
BUN SERPL-MCNC: 8 MG/DL (ref 6–24)
BUN/CREAT SERPL: 11 (ref 9–20)
C TRACH RRNA SPEC QL NAA+PROBE: NEGATIVE
CALCIUM SERPL-MCNC: 9.3 MG/DL (ref 8.7–10.2)
CHLORIDE SERPL-SCNC: 104 MMOL/L (ref 96–106)
CHOLEST SERPL-MCNC: 155 MG/DL (ref 100–199)
CO2 SERPL-SCNC: 27 MMOL/L (ref 18–29)
CREAT SERPL-MCNC: 0.76 MG/DL (ref 0.76–1.27)
ERYTHROCYTE [DISTWIDTH] IN BLOOD BY AUTOMATED COUNT: 12.9 % (ref 12.3–15.4)
EST. AVERAGE GLUCOSE BLD GHB EST-MCNC: 91 MG/DL
GLOBULIN SER CALC-MCNC: 2.2 G/DL (ref 1.5–4.5)
GLUCOSE SERPL-MCNC: 96 MG/DL (ref 65–99)
HBA1C MFR BLD: 4.8 % (ref 4.8–5.6)
HCT VFR BLD AUTO: 43 % (ref 37.5–51)
HDLC SERPL-MCNC: 38 MG/DL
HGB BLD-MCNC: 14.4 G/DL (ref 12.6–17.7)
INTERPRETATION, 910389: NORMAL
LDLC SERPL CALC-MCNC: 101 MG/DL (ref 0–99)
MCH RBC QN AUTO: 31.7 PG (ref 26.6–33)
MCHC RBC AUTO-ENTMCNC: 33.5 G/DL (ref 31.5–35.7)
MCV RBC AUTO: 95 FL (ref 79–97)
N GONORRHOEA RRNA SPEC QL NAA+PROBE: NEGATIVE
PLATELET # BLD AUTO: 196 X10E3/UL (ref 150–379)
POTASSIUM SERPL-SCNC: 4.2 MMOL/L (ref 3.5–5.2)
PROT SERPL-MCNC: 6.6 G/DL (ref 6–8.5)
RBC # BLD AUTO: 4.54 X10E6/UL (ref 4.14–5.8)
SODIUM SERPL-SCNC: 144 MMOL/L (ref 134–144)
TRIGL SERPL-MCNC: 79 MG/DL (ref 0–149)
VLDLC SERPL CALC-MCNC: 16 MG/DL (ref 5–40)
WBC # BLD AUTO: 4.4 X10E3/UL (ref 3.4–10.8)

## 2017-10-19 NOTE — PROGRESS NOTES
Your gonorrhea and chlamydia tests are negative. You do not have a UTI. Are you still having blood in your semen?     Dr. Brandon Ordaz

## 2017-10-23 ENCOUNTER — TELEPHONE (OUTPATIENT)
Dept: FAMILY MEDICINE CLINIC | Age: 42
End: 2017-10-23

## 2017-10-24 NOTE — TELEPHONE ENCOUNTER
Spoke with patient I.D. X 2, patient was concerned about recent lab results. Patient was informed that once Dr. Malaika Piedra gives me the ok to release those results that I would do so. Patient verbalized understanding.

## 2017-10-31 ENCOUNTER — HOSPITAL ENCOUNTER (OUTPATIENT)
Dept: CT IMAGING | Age: 42
Discharge: HOME | End: 2017-10-31
Attending: FAMILY MEDICINE
Payer: COMMERCIAL

## 2017-10-31 ENCOUNTER — OFFICE VISIT (OUTPATIENT)
Dept: FAMILY MEDICINE CLINIC | Age: 42
End: 2017-10-31

## 2017-10-31 VITALS
HEIGHT: 69 IN | SYSTOLIC BLOOD PRESSURE: 134 MMHG | RESPIRATION RATE: 20 BRPM | TEMPERATURE: 98.3 F | OXYGEN SATURATION: 98 % | WEIGHT: 199.1 LBS | DIASTOLIC BLOOD PRESSURE: 76 MMHG | HEART RATE: 66 BPM | BODY MASS INDEX: 29.49 KG/M2

## 2017-10-31 DIAGNOSIS — R31.0 GROSS HEMATURIA: ICD-10-CM

## 2017-10-31 DIAGNOSIS — R36.1 HEMATOSPERMIA: ICD-10-CM

## 2017-10-31 DIAGNOSIS — R10.9 FLANK PAIN: Primary | ICD-10-CM

## 2017-10-31 DIAGNOSIS — R10.9 RIGHT FLANK PAIN: ICD-10-CM

## 2017-10-31 DIAGNOSIS — R10.9 RIGHT FLANK PAIN: Primary | ICD-10-CM

## 2017-10-31 LAB
BASOPHILS # BLD AUTO: 0 X10E3/UL (ref 0–0.2)
BASOPHILS NFR BLD AUTO: 0 %
BUN SERPL-MCNC: 5 MG/DL (ref 6–24)
BUN/CREAT SERPL: 6 (ref 9–20)
CALCIUM SERPL-MCNC: 9.9 MG/DL (ref 8.7–10.2)
CHLORIDE SERPL-SCNC: 103 MMOL/L (ref 96–106)
CO2 SERPL-SCNC: 29 MMOL/L (ref 18–29)
CREAT SERPL-MCNC: 0.78 MG/DL (ref 0.76–1.27)
EOSINOPHIL # BLD AUTO: 0.1 X10E3/UL (ref 0–0.4)
EOSINOPHIL NFR BLD AUTO: 1 %
ERYTHROCYTE [DISTWIDTH] IN BLOOD BY AUTOMATED COUNT: 12.9 % (ref 12.3–15.4)
GFR SERPLBLD CREATININE-BSD FMLA CKD-EPI: 111 ML/MIN/1.73
GFR SERPLBLD CREATININE-BSD FMLA CKD-EPI: 128 ML/MIN/1.73
GLUCOSE SERPL-MCNC: 102 MG/DL (ref 65–99)
HCT VFR BLD AUTO: 41.8 % (ref 37.5–51)
HGB BLD-MCNC: 15 G/DL (ref 12.6–17.7)
LYMPHOCYTES # BLD AUTO: 1.3 X10E3/UL (ref 0.7–3.1)
LYMPHOCYTES NFR BLD AUTO: 15 %
MCH RBC QN AUTO: 32.9 PG (ref 26.6–33)
MCHC RBC AUTO-ENTMCNC: 35.9 G/DL (ref 31.5–35.7)
MCV RBC AUTO: 92 FL (ref 79–97)
MONOCYTES # BLD AUTO: 0.7 X10E3/UL (ref 0.1–0.9)
MONOCYTES NFR BLD AUTO: 8 %
NEUTROPHILS # BLD AUTO: 6.1 X10E3/UL (ref 1.4–7)
NEUTROPHILS NFR BLD AUTO: 76 %
PLATELET # BLD AUTO: 210 X10E3/UL (ref 150–379)
POTASSIUM SERPL-SCNC: 4.4 MMOL/L (ref 3.5–5.2)
RBC # BLD AUTO: 4.56 X10E6/UL (ref 4.14–5.8)
SODIUM SERPL-SCNC: 143 MMOL/L (ref 134–144)
WBC # BLD AUTO: 8.1 X10E3/UL (ref 3.4–10.8)

## 2017-10-31 PROCEDURE — 74176 CT ABD & PELVIS W/O CONTRAST: CPT

## 2017-10-31 RX ORDER — CIPROFLOXACIN 500 MG/1
500 TABLET ORAL 2 TIMES DAILY
Qty: 14 TAB | Refills: 0 | Status: SHIPPED | OUTPATIENT
Start: 2017-10-31 | End: 2017-11-07

## 2017-10-31 RX ORDER — NAPROXEN 500 MG/1
500 TABLET ORAL
Qty: 20 TAB | Refills: 0 | Status: SHIPPED | OUTPATIENT
Start: 2017-10-31 | End: 2018-01-15

## 2017-10-31 NOTE — PROGRESS NOTES
Chief Complaint   Patient presents with    Back Pain     patient states pain in the mid back area-Started on Sunday. No heavy lifting     1. Have you been to the ER, urgent care clinic since your last visit? Hospitalized since your last visit? No    2. Have you seen or consulted any other health care providers outside of the [address] [place] [address] since your last visit? Include any pap smears or colon screening.  No

## 2017-10-31 NOTE — PROGRESS NOTES
[address] [place] [address] Visit   10/31/2017  Patient ID: Mike Alex is a 35 y.o. male. Assessment/Plan:    Diagnoses and all orders for this visit:    Treat for pyelo. VSS. Imaging to r/o stone. Seems to describe hematospermia and hematuria. Will refer to urology. If stone, will need for urgent follow up. 1. Right flank pain  -     URINALYSIS W/ RFLX MICROSCOPIC  -     CULTURE, URINE  -     CT ABD WO CONT; Future  -     CBC WITH AUTOMATED DIFF  -     METABOLIC PANEL, BASIC    2. Gross hematuria  -     URINALYSIS W/ RFLX MICROSCOPIC  -     CULTURE, URINE  -     CT ABD WO CONT; Future    3. Hematospermia  -     URINALYSIS W/ RFLX MICROSCOPIC  -     CULTURE, URINE    Other orders  -     ciprofloxacin HCl (CIPRO) 500 mg tablet; Take 1 Tab by mouth two (2) times a day for 7 days. For bladder infection  Indications: PYELONEPHRITIS  -     naproxen (NAPROSYN) 500 mg tablet; Take 1 Tab by mouth every twelve (12) hours as needed. Indications: flank pain      Counselled pt on Patient health concerns and plans. Patient was offered a choice/choices in the treatment plan today. Reviewed return precautions as appropriate. Patient expresses understanding of the plan and agrees with recommendations. See patient instructions for more. Patient Instructions   . Lily Fernandez TODAY, please go to:   CHECK OUT    LAB    Please schedule the following appointments at [address] [place] [address]:  · Complete Physical Exam and lab follow up with Dr. Damon Zimmerman, next available. Today's Plan:    - CT scan today  - start cipro (antibiotic)  - take naprosyn every 12 hours for pain  - may also take acetaminophen 1000mg every 8 hours for pain. Urgent attention for fever, increased pain, vomiting    Subjective:   HPI:  Mike Alex is a 35 y.o. male being seen for:   Chief Complaint   Patient presents with    Back Pain     patient states pain in the mid back area-Started on Sunday. No heavy lifting     ·  right flank pain started 3 days ago. First lasted about 4 hours. Returned the next night, could not sleep. Has tried aleve, ibuprofen. · Seen earlier this month for hematospermia. At that time- UCx neg  · Some nausea today, mild, usual  · No vomiting  · No fever, dysuria  · Urine cloudy last night  · No frequency, urgency  · Usual bowel pattern. No blood in BM  · Has some usual abdominal pain, gas like pain in lower abdomen. · Hematospermia improved then returned ? In urine. Review of Systems  Otherwise as noted in HPI  ? Objective:     Visit Vitals    /76    Pulse 66    Temp 98.3 °F (36.8 °C) (Oral)    Resp 20    Ht 5' 9\" (1.753 m)    Wt 199 lb 1.6 oz (90.3 kg)    SpO2 98%    BMI 29.4 kg/m2     Wt Readings from Last 3 Encounters:   10/31/17 199 lb 1.6 oz (90.3 kg)   10/17/17 193 lb (87.5 kg)   07/10/17 198 lb (89.8 kg)     BP Readings from Last 3 Encounters:   10/31/17 134/76   10/17/17 (!) 136/96   07/10/17 130/90     PHQ over the last two weeks 10/17/2017   Little interest or pleasure in doing things Not at all   Feeling down, depressed or hopeless Not at all   Total Score PHQ 2 0   Trouble falling or staying asleep, or sleeping too much -   Feeling tired or having little energy -   Poor appetite or overeating -   Feeling bad about yourself - or that you are a failure or have let yourself or your family down -   Trouble concentrating on things such as school, work, reading or watching TV -   Moving or speaking so slowly that other people could have noticed; or the opposite being so fidgety that others notice -   Thoughts of being better off dead, or hurting yourself in some way -   PHQ 9 Score -       Physical Exam   Constitutional: He appears well-developed. Uncomfortable, frequently changing position in room, due to pain   Pulmonary/Chest: Effort normal. No respiratory distress. Abdominal: Soft. Normal appearance and bowel sounds are normal. He exhibits no distension, no fluid wave and no mass.  There is no hepatosplenomegaly. There is no tenderness. There is no rigidity, no rebound, no guarding and no CVA tenderness. Musculoskeletal:   No midline/vertebral or paraspinal ttp   Neurological: He is alert. Psychiatric: His behavior is normal.       No Known Allergies  Prior to Admission medications    Medication Sig Start Date End Date Taking? Authorizing Provider   ciprofloxacin HCl (CIPRO) 500 mg tablet Take 1 Tab by mouth two (2) times a day for 7 days. For bladder infection  Indications: PYELONEPHRITIS 10/31/17 11/7/17 Yes Yorkshire Bathe. Ken Krishnamurthy MD   naproxen (NAPROSYN) 500 mg tablet Take 1 Tab by mouth every twelve (12) hours as needed. Indications: flank pain 10/31/17  Yes Vikas Bathe. Ken Krishnamurthy MD   mirtazapine (REMERON) 7.5 mg tablet TAKE 1 TAB BY MOUTH NIGHTLY. TAKE AT NIGHT. MAY MAKE TIRED. 9/6/17  Yes Vikas Bathe. Ken Krishnamurthy MD   ALPRAZolam Norman Bailey) 1 mg tablet TAKE 1 TABLET BY MOUTH DAILY AS NEEDED FOR ANXIETY 7/10/17  Yes Yorkshire Bathe. Ken Krishnamurthy MD   esomeprazole (NEXIUM) 40 mg capsule TAKE 1 CAPSULES BY MOUTH EVERY DAY 7/5/17  Yes Vikas Bathe. Ken Krishnamurthy MD   CARAFATE 100 mg/mL suspension Take 1 tsp by mouth as needed.  3/9/16   Historical Provider     Patient Active Problem List   Diagnosis Code    Panic anxiety syndrome F41.0    GERD (gastroesophageal reflux disease) K21.9    Pulmonary nodules/lesions, multiple R91.8    Melanoma of trunk (HCC) C43.59    Low back pain M54.5

## 2017-10-31 NOTE — MR AVS SNAPSHOT
Visit Information Date & Time Provider Department Dept. Phone Encounter #  
 10/31/2017 10:00 AM [facility] Marlena Voss MD [facility] [phone] [id number] Upcoming Health Maintenance Date Due Pneumococcal 19-64 Highest Risk (1 of 3 - PCV13) 12/6/1992 DTaP/Tdap/Td series (1 - Tdap) 6/25/2004 Allergies as of 10/31/2017  Review Complete On: 10/31/2017 By: Suresh Mayorga No Known Allergies Current Immunizations  Reviewed on 7/11/2016 Name Date Td 6/24/2004 Not reviewed this visit You Were Diagnosed With   
  
 Codes Comments Right flank pain    -  Primary ICD-10-CM: R10.9 ICD-9-CM: 789.09 Gross hematuria     ICD-10-CM: R31.0 ICD-9-CM: 599.71 Hematospermia     ICD-10-CM: R36.1 ICD-9-CM: 608.82 Vitals BP Pulse Temp Resp Height(growth percentile) Weight(growth percentile) 134/76 (!) 106 98.3 °F (36.8 °C) (Oral) 20 5' 9\" (1.753 m) 199 lb 1.6 oz (90.3 kg) SpO2 BMI Smoking Status 98% 29.4 kg/m2 Former Smoker Vitals History BMI and BSA Data Body Mass Index Body Surface Area  
 29.4 kg/m 2 2.1 m 2 Preferred Pharmacy [facility] Phone [id number] [address] [phone] Your Updated Medication List  
  
   
This list is accurate as of: 10/31/17 10:37 AM.  Always use your most recent med list.  
  
  
  
  
 ALPRAZolam 1 mg tablet Commonly known as:  XANAX  
TAKE 1 TABLET BY MOUTH DAILY AS NEEDED FOR ANXIETY CARAFATE 100 mg/mL suspension Generic drug:  sucralfate Take 1 tsp by mouth as needed. ciprofloxacin HCl 500 mg tablet Commonly known as:  CIPRO Take 1 Tab by mouth two (2) times a day for 7 days. For bladder infection  Indications: PYELONEPHRITIS  
  
 esomeprazole 40 mg capsule Commonly known as:  NEXIUM  
TAKE 1 CAPSULES BY MOUTH EVERY DAY  
  
 mirtazapine 7.5 mg tablet Commonly known as:  REMERON  
TAKE 1 TAB BY MOUTH NIGHTLY. TAKE AT NIGHT. MAY MAKE TIRED. naproxen 500 mg tablet Commonly known as:  NAPROSYN Take 1 Tab by mouth every twelve (12) hours as needed. Indications: flank pain Prescriptions Sent to Pharmacy Refills  
 ciprofloxacin HCl (CIPRO) 500 mg tablet 0 Sig: Take 1 Tab by mouth two (2) times a day for 7 days. For bladder infection  Indications: PYELONEPHRITIS Class: Normal  
 Pharmacy: [facility]/pharmacy #[id number] Ph #: [phone] Route: Oral  
 naproxen (NAPROSYN) 500 mg tablet 0 Sig: Take 1 Tab by mouth every twelve (12) hours as needed. Indications: flank pain  
 Class: Normal  
 Pharmacy: [facility]/pharmacy #[id number] Ph #: [phone] Route: Oral  
  
We Performed the Following CULTURE, URINE [id number] CPT(R)] URINALYSIS W/ RFLX MICROSCOPIC [19785 CPT(R)] To-Do List   
 10/31/2017 Imaging:  CT ABD WO CONT Patient Instructions Magdaleno Blank TODAY, please go to: CHECK OUT  
 LAB Please schedule the following appointments at [address] [place] [address]: 
· Complete Physical Exam and lab follow up with Dr. Julio C Givens, next available. Today's Plan: 
 
- CT scan today 
- start cipro (antibiotic) - take naprosyn every 12 hours for pain 
- may also take acetaminophen 1000mg every 8 hours for pain. Urgent attention for fever, increased pain, vomiting Introducing [facility] HE[facility] SERVICES! Dear Catherine Díaz: 
Thank you for requesting a [employer] account. Our records indicate that you already have an active [employer] account. You can access your account anytime at [email or web address] Did you know that you can access your hospital and ER discharge instructions at any time in [employer]? You can also review all of your test results from your hospital stay or ER visit. Additional Information If you have questions, please visit the Frequently Asked Questions section of the [facility]My[facility]art website at [email or web address] Remember, [employer] is NOT to be used for urgent needs. For medical emergencies, dial 911. Now available from your iPhone and Android! Please provide this summary of care documentation to your next provider. Your primary care clinician is listed as Vandana Bourgeois. If you have any questions after today's visit, please call [phone].

## 2017-10-31 NOTE — PATIENT INSTRUCTIONS
.. TODAY, please go to:   CHECK OUT    LAB    Please schedule the following appointments at [address] [place] [address]:  · Complete Physical Exam and lab follow up with Dr. Nita Calixto, next available. Today's Plan:    - CT scan today  - start cipro (antibiotic)  - take naprosyn every 12 hours for pain  - may also take acetaminophen 1000mg every 8 hours for pain.     Urgent attention for fever, increased pain, vomiting

## 2017-10-31 NOTE — PROGRESS NOTES
Spoke with patient, ID verified X 2; Spoken with patient @ 150 pm; Spoken with patient c/w Recent CT scan results. Also stated to patient to follow up with the urologist and follow up with  after being seen with the urologist. Patient understood.

## 2017-10-31 NOTE — PROGRESS NOTES
CT scan shows kidney stone on the left . Pain was on right. Continue plan as discussed. Please call patient, ask him to call and schedule follow up with Urology preferably within 1-2 weeks. (see referral given today). If his urology appointment is more than 2 weeks away, advise him to also schedule follow up with me in 1 week.

## 2017-11-01 LAB
APPEARANCE UR: CLEAR
BACTERIA UR CULT: NO GROWTH
BILIRUB UR QL STRIP: NEGATIVE
COLOR UR: YELLOW
GLUCOSE UR QL: NEGATIVE
HGB UR QL STRIP: NEGATIVE
KETONES UR QL STRIP: NEGATIVE
LEUKOCYTE ESTERASE UR QL STRIP: NEGATIVE
MICRO URNS: NORMAL
NITRITE UR QL STRIP: NEGATIVE
PH UR STRIP: 7 [PH] (ref 5–7.5)
PROT UR QL STRIP: NEGATIVE
SP GR UR: 1.01 (ref 1–1.03)
UROBILINOGEN UR STRIP-MCNC: 0.2 MG/DL (ref 0.2–1)

## 2017-11-02 ENCOUNTER — HOSPITAL ENCOUNTER (OUTPATIENT)
Dept: GENERAL RADIOLOGY | Age: 42
Discharge: HOME | End: 2017-11-02
Payer: COMMERCIAL

## 2017-11-02 DIAGNOSIS — N20.0 KIDNEY STONE: ICD-10-CM

## 2017-11-02 PROCEDURE — 74000 XR ABD (KUB): CPT

## 2017-11-03 ENCOUNTER — TELEPHONE (OUTPATIENT)
Dept: FAMILY MEDICINE CLINIC | Age: 42
End: 2017-11-03

## 2017-11-03 NOTE — TELEPHONE ENCOUNTER
Patient says he was seen by dr Kylee Ruth on 10/31/2017 and diagnosed with right flank pain. Says he was referred to see a urologist(see notes in cc)and was told that he did not see a stone. He says he is still in a lot of pain and the naprosyn is not helping. He is asking for someone to look at the report from the urologist and call him to let him know what he should do next. His contact # is [phone].

## 2017-11-04 NOTE — TELEPHONE ENCOUNTER
Message found in my inbox on Saturday. Attempted to call patient but no answer. No message left. Sent My Chart message to patient.

## 2017-11-06 RX ORDER — ESOMEPRAZOLE MAGNESIUM 40 MG/1
CAPSULE, DELAYED RELEASE ORAL
Qty: 90 CAP | Refills: 3 | Status: SHIPPED | OUTPATIENT
Start: 2017-11-06 | End: 2018-07-23

## 2017-11-06 RX ORDER — CYCLOBENZAPRINE HCL 10 MG
5-10 TABLET ORAL
Qty: 30 TAB | Refills: 0 | Status: SHIPPED | OUTPATIENT
Start: 2017-11-06 | End: 2018-01-15

## 2017-11-06 NOTE — TELEPHONE ENCOUNTER
Initially treated for possible pyelo. UCx returned negative. CT scan w/o etiology. Saw urology with no change in plan.      Advise patient of the following:  - no UTI  - pain may be musculoskeletal  - recommend: continue naprosyn prn, add tylenol- available OTC (may take 650mg every 6 hours as needed), add flexeril prn (I am sending this to pharmacy)

## 2017-11-06 NOTE — TELEPHONE ENCOUNTER
Writer called pt to clarify what he needs from message left on Friday. Writer spoke with pt, pt verified . Pt stated that he honestly does not know what to do from this point. Stated that he called Dr. Love Villavicencio office Friday, after KUB on 17, due to his increased pain on his right side again; pt stated that he spoke with Dr. Love Villavicencio nurse, who informed him that Dr. Cj Duran advised pt to call his PCP about what to do next. Pt stated that he almost went to the ER for his pain, and that is all he needs at this moment, is something for pain because the Naprosyn is not working and he cannot live like this if the pain is going to continue to be this bad. Pt stated also stated that he cannot afford to keep going to the ER for the pain. Writer stated that she would put a message in for Dr. Chong Hanson and also notify Dr. Chong Hanson when she is done seeing patients so advice can be given on what to do next. Pt verbalized understanding and appreciation.

## 2017-11-06 NOTE — TELEPHONE ENCOUNTER
Writer called pt back with recommendations from Dr. Wise Cancer. Writer spoke with pt, pt verified . Writer made pt aware of recommendations by Dr. Wise Cancer: Advise patient of the following:  - no UTI  - pain may be musculoskeletal  - recommend: continue naprosyn prn, add tylenol- available OTC (may take 650mg every 6 hours as needed), add flexeril prn (I am sending this to pharmacy)    Pt verbalized understanding and appreciation.

## 2018-01-08 DIAGNOSIS — F41.0 PANIC ANXIETY SYNDROME: ICD-10-CM

## 2018-01-12 RX ORDER — ALPRAZOLAM 1 MG/1
TABLET ORAL
Qty: 30 TAB | OUTPATIENT
Start: 2018-01-12

## 2018-01-15 ENCOUNTER — OFFICE VISIT (OUTPATIENT)
Dept: FAMILY MEDICINE CLINIC | Age: 43
End: 2018-01-15

## 2018-01-15 VITALS
RESPIRATION RATE: 16 BRPM | DIASTOLIC BLOOD PRESSURE: 89 MMHG | SYSTOLIC BLOOD PRESSURE: 131 MMHG | WEIGHT: 193 LBS | HEIGHT: 69 IN | OXYGEN SATURATION: 98 % | BODY MASS INDEX: 28.58 KG/M2 | HEART RATE: 88 BPM | TEMPERATURE: 96.3 F

## 2018-01-15 DIAGNOSIS — F41.0 PANIC ANXIETY SYNDROME: Primary | ICD-10-CM

## 2018-01-15 DIAGNOSIS — Z79.899 LONG-TERM USE OF HIGH-RISK MEDICATION: ICD-10-CM

## 2018-01-15 RX ORDER — ALPRAZOLAM 1 MG/1
TABLET ORAL
Qty: 30 TAB | Refills: 5 | Status: SHIPPED | OUTPATIENT
Start: 2018-01-15 | End: 2018-07-23 | Stop reason: SDUPTHER

## 2018-01-15 NOTE — MR AVS SNAPSHOT
Visit Information Date & Time Provider Department Dept. Phone Encounter #  
 1/15/2018  9:40 AM Luevenia Litten. Berry Beltran MD [facility] [phone] [id number] Upcoming Health Maintenance Date Due Pneumococcal 19-64 Highest Risk (2 of 3 - PCV13) 1/15/2019 DTaP/Tdap/Td series (2 - Td) 1/15/2028 Allergies as of 1/15/2018  Review Complete On: 1/15/2018 By: Adrienne Amin LPN No Known Allergies Current Immunizations  Reviewed on 7/11/2016 Name Date Td 6/24/2004 Not reviewed this visit You Were Diagnosed With   
  
 Codes Comments Panic anxiety syndrome    -  Primary ICD-10-CM: F41.0 ICD-9-CM: 300.01 Long-term use of high-risk medication     ICD-10-CM: Z79.899 ICD-9-CM: V58.69 Vitals BP Pulse Temp Resp Height(growth percentile) Weight(growth percentile) 131/89 (BP 1 Location: Left arm, BP Patient Position: Sitting) 88 96.3 °F (35.7 °C) (Oral) 16 5' 9\" (1.753 m) 193 lb (87.5 kg) SpO2 BMI Smoking Status 98% 28.5 kg/m2 Former Smoker BMI and BSA Data Body Mass Index Body Surface Area 28.5 kg/m 2 2.06 m 2 Preferred Pharmacy [facility] Phone [id number] [address] [phone] Your Updated Medication List  
  
   
This list is accurate as of: 1/15/18 10:43 AM.  Always use your most recent med list.  
  
  
  
  
 ALPRAZolam 1 mg tablet Commonly known as:  XANAX  
TAKE 1 TABLET BY MOUTH DAILY AS NEEDED FOR ANXIETY  
  
 esomeprazole 40 mg capsule Commonly known as:  NEXIUM  
TAKE 1 CAPSULE BY MOUTH EVERY DAY Prescriptions Printed Refills ALPRAZolam (XANAX) 1 mg tablet 5 Sig: TAKE 1 TABLET BY MOUTH DAILY AS NEEDED FOR ANXIETY Class: Print We Performed the Following [address] [place] [address] MONITORING [[id number] Custom] DRUG SCREEN 11 W/CONF, SERUM [[id number] Custom] Patient Instructions Darin Wilkerson TODAY, please go to: 
 Urine or bloodwork today CHECK OUT If you received a referral, Show the  Please schedule the following appointments at [address] [place] [address]: 
· Medication refill and Complete Physical Exam and lab follow up with Dr. Eid Has in July 2018 · Lab visit, fasting, the week before our visit. Introducing [facility]! Dear Carlos Hodge: 
Thank you for requesting a [facility] account. Our records indicate that you already have an active [facility] account. You can access your account anytime at [email or web address] Did you know that you can access your hospital and ER discharge instructions at any time in [facility]? You can also review all of your test results from your hospital stay or ER visit. Additional Information If you have questions, please visit the Frequently Asked Questions section of the [facility] website at [email or web address] Remember, [facility] is NOT to be used for urgent needs. For medical emergencies, dial 911. Now available from your iPhone and Android! Please provide this summary of care documentation to your next provider. Your primary care clinician is listed as Gabriel Bell. If you have any questions after today's visit, please call [phone].

## 2018-01-15 NOTE — PATIENT INSTRUCTIONS
Zulma Weathers TODAY, please go to:   Urine or bloodwork today   CHECK OUT     If you received a referral, Show the     Please schedule the following appointments at [address] [place] [address]:  · Medication refill and Complete Physical Exam and lab follow up with Dr. Brisa Bower in July 2018  · Lab visit, fasting, the week before our visit.

## 2018-01-15 NOTE — PROGRESS NOTES
[facility] [address] [place] [address] Visit   01/15/2018  Patient ID: Aram Sinclair is a 38 y.o. male. Assessment/Plan:    Diagnoses and all orders for this visit:    1. Panic anxiety syndrome  -     ALPRAZolam (XANAX) 1 mg tablet; TAKE 1 TABLET BY MOUTH DAILY AS NEEDED FOR ANXIETY  -     COMPLIANCE DRUG SCREEN/PRESCRIPTION MONITORING  -     DRUG SCREEN 11 W/CONF, SERUM    2. Long-term use of high-risk medication  -     COMPLIANCE DRUG SCREEN/PRESCRIPTION MONITORING  -     DRUG SCREEN 11 W/CONF, SERUM      · Patient's plan currently includes: Xanax daily  · Functional improvements that justify chronic benzodiazepine medications: yes  · Treatment agreement was signed by pt and myself on: 1/17/17  ·  appropriate and last checked on: 1/15/2018  · Due for toxicology today   consider TCA in future  Comments:  has not responded to SSRIs, SNRIs, buspar, remeron. currently needing chronic xanax. Counselled pt on Patient health concerns and plans. Patient was offered a choice/choices in the treatment plan today. Reviewed return precautions as appropriate. Patient expresses understanding of the plan and agrees with recommendations. See patient instructions for more. Patient Instructions   . TODAY, please go to:   Urine or bloodwork today   CHECK OUT     If you received a referral, Show the     Please schedule the following appointments at [address] [place] [address]:  · Medication refill and Complete Physical Exam and lab follow up with Dr. Jose Armando Zuniga in July 2018  · Lab visit, fasting, the week before our visit. Subjective:   HPI:  Aram Sinclair is a 38 y.o. male being seen for:   Chief Complaint   Patient presents with    Medication Refill     ·  no longer taking remeron. Stopped after first Rx. Did not note a difference. · Takes 0-2 per day depending on work stress. Review of Systems  Otherwise as noted in HPI  ?   Objective:     Visit Vitals    /89 (BP 1 Location: Left arm, BP Patient Position: Sitting)    Pulse 88    Temp 96.3 °F (35.7 °C) (Oral)    Resp 16    Ht 5' 9\" (1.753 m)    Wt 193 lb (87.5 kg)    SpO2 98%    BMI 28.5 kg/m2     Wt Readings from Last 3 Encounters:   01/15/18 193 lb (87.5 kg)   10/31/17 199 lb 1.6 oz (90.3 kg)   10/17/17 193 lb (87.5 kg)     BP Readings from Last 3 Encounters:   01/15/18 131/89   10/31/17 134/76   10/17/17 (!) 136/96       Physical Exam   Constitutional: He appears well-developed and well-nourished. No distress. Pulmonary/Chest: Effort normal. No respiratory distress. Neurological: He is alert. Psychiatric: He has a normal mood and affect. His behavior is normal.       No Known Allergies  Prior to Admission medications    Medication Sig Start Date End Date Taking? Authorizing Provider   ALPRAZolam Arianna Kelley) 1 mg tablet TAKE 1 TABLET BY MOUTH DAILY AS NEEDED FOR ANXIETY 1/15/18  Yes Debroah Peabody. Ruddy Corbin MD   esomeprazole (NEXIUM) 40 mg capsule TAKE 1 CAPSULE BY MOUTH EVERY DAY 11/6/17  Yes Debroah Peabody.  Ruddy Corbin MD     Patient Active Problem List   Diagnosis Code    Panic anxiety syndrome F41.0    GERD (gastroesophageal reflux disease) K21.9    Pulmonary nodules/lesions, multiple R91.8    Melanoma of trunk ([facility] [address] C43.59    Low back pain M54.5

## 2018-01-15 NOTE — PROGRESS NOTES
Chief Complaint   Patient presents with    Medication Refill       [facility]  Nurse Note for Controlled Substance Refill  Chief Complaint   Patient presents with    Medication Refill      Patient requests refill of: Xanax     There were no vitals taken for this visit. · Diagnosis: Anxiety   · Last drug screen date: Needs to be completed   · Urine provided today: Yes, will need to be provided   · Treatment agreement/Informed Consent date: 2017  ·  reviewed by provider: 1/15/2018   · MME per day:   · Provider for today's encounter : Dr. Elvis Timmons     · Kyree Santos Date: 1/15/2018  Medication name: Xanax   Pill strength: 1 mg   How medication is supposed to be taken: Take 1 tablet by mouth daily as needed for anxiety   How medication is being taken: Takes as needed   Date prescription filled: 2017  Prescribing Provider: Dr. Elvis Timmons   # of pills prescribed: 30  # of pills remainin   # pills taking per day: Takes as needed   Last dose of medication taken, per patient: 2018  Pain scale and location of pain: 0  Counted in front of patient. Bottle with contents returned to patient. [facility]  reviewed by provider. Discussed pill count with provider. Per provider, refill medication granted. Follow up as advised. Pt was made aware of provider's decision, and to return AS DIRECTED for an office visit, if one is not already scheduled at this time. Controlled Substance Refill sheet signed by patient and provider. Provided with naloxone prescription, if taking benzodiazepine, prior overdose, substance abuse, or >= 120 MME per day. Continuation of treatment with controlled substance is justified by patient's functional improvements. Patient will benefit from continued prescribing. 1. Have you been to the ER, urgent care clinic since your last visit? Hospitalized since your last visit? No     2.  Have you seen or consulted any other health care providers outside of the [address] [place] [address] since your last visit? Include any pap smears or colon screening. No     The patient was counseled on the dangers of tobacco use, and was advised never to start again. Reviewed strategies to maximize success, including never to start again. Health Maintenance Due   Topic Date Due    Pneumococcal 19-64 Highest Risk (1 of 3 - PCV13) Discussed with patient today and advised to follow up.    12/06/1992    DTaP/Tdap/Td series (1 - Tdap) Discussed with patient today and advised to follow up.    06/25/2004     ACP is not on file, advised to return.

## 2018-01-30 LAB — DRUGS UR: NORMAL

## 2018-04-11 NOTE — PROGRESS NOTES
Inappropriate urine. Will discuss with patient at next visit for refill. Recommend counseling against marijuana and discuss how he is using xanax.

## 2018-05-21 ENCOUNTER — OFFICE VISIT (OUTPATIENT)
Dept: FAMILY MEDICINE CLINIC | Age: 43
End: 2018-05-21

## 2018-05-21 VITALS
HEART RATE: 96 BPM | OXYGEN SATURATION: 100 % | RESPIRATION RATE: 18 BRPM | SYSTOLIC BLOOD PRESSURE: 134 MMHG | TEMPERATURE: 98.2 F | HEIGHT: 69 IN | BODY MASS INDEX: 28.78 KG/M2 | WEIGHT: 194.3 LBS | DIASTOLIC BLOOD PRESSURE: 92 MMHG

## 2018-05-21 DIAGNOSIS — L03.90 CELLULITIS, UNSPECIFIED CELLULITIS SITE: Primary | ICD-10-CM

## 2018-05-21 RX ORDER — CEPHALEXIN 500 MG/1
500 CAPSULE ORAL 4 TIMES DAILY
Qty: 40 CAP | Refills: 0 | Status: SHIPPED | OUTPATIENT
Start: 2018-05-21 | End: 2018-05-21 | Stop reason: DRUGHIGH

## 2018-05-21 RX ORDER — CEPHALEXIN 500 MG/1
500 CAPSULE ORAL 2 TIMES DAILY
Qty: 20 CAP | Refills: 0 | Status: SHIPPED | OUTPATIENT
Start: 2018-05-21 | End: 2018-05-31

## 2018-05-21 NOTE — PROGRESS NOTES
Chief Complaint   Patient presents with    Toe Pain     Patient stated he has pain, swelling, redness, and itching around left great toe nail. 1. Have you been to the ER, urgent care clinic since your last visit? Hospitalized since your last visit? NO    2. Have you seen or consulted any other health care providers outside of the [address] [place] [address] since your last visit? Include any pap smears or colon screening.  NO

## 2018-05-21 NOTE — PATIENT INSTRUCTIONS
1) Please take the keflex twice a day for 10 days. This is an antibiotic and you should take all of it as directed until it is complete, even if you are feeling better. This prevents bacterial resistance. Eating healthy, drinking enough fluids (especially water) and getting enough sleep (8hrs) are also important to help you heal.   Please use good handwashing technique and make sure you wash hands before and after eating. Use hand  if you are in a public place. Please monitor for signs or symptoms of infection including redness, warmth, swelling, discharge and/or fever/chills. Epsom salt soak will help reduce swelling and keep toe clean. Can do this 2-3x daily.

## 2018-05-21 NOTE — MR AVS SNAPSHOT
[address] [place] [address] 
[address] [place] [address] 
[phone] Patient: Lino Weinberg MRN: [id number] :1975 Visit Information Date & Time Provider Department Dept. Phone Encounter #  
 2018  4:20 PM Dante Gonsalez NP [facility] [phone] [id number] Upcoming Health Maintenance Date Due Influenza Age 5 to Adult 2018 Pneumococcal 19-64 Highest Risk (2 of 3 - PCV13) 1/15/2019 DTaP/Tdap/Td series (2 - Td) 1/15/2028 Allergies as of 2018  Review Complete On: 2018 By: Dante Gonsalez NP No Known Allergies Current Immunizations  Reviewed on 2016 Name Date Td 2004 Not reviewed this visit You Were Diagnosed With   
  
 Codes Comments Cellulitis, unspecified cellulitis site    -  Primary ICD-10-CM: L03.90 ICD-9-CM: 663. 9 Vitals BP Pulse Temp Resp Height(growth percentile) Weight(growth percentile) (!) 134/92 (BP 1 Location: Left arm, BP Patient Position: Sitting) 96 98.2 °F (36.8 °C) (Oral) 18 5' 9\" (1.753 m) 194 lb 4.8 oz (88.1 kg) SpO2 BMI Smoking Status 100% 28.69 kg/m2 Former Smoker Vitals History BMI and BSA Data Body Mass Index Body Surface Area  
 28.69 kg/m 2 2.07 m 2 Preferred Pharmacy Pharmacy Name Phone [id number] [address] [phone] Your Updated Medication List  
  
   
This list is accurate as of 18  4:58 PM.  Always use your most recent med list.  
  
  
  
  
 ALPRAZolam 1 mg tablet Commonly known as:  XANAX  
TAKE 1 TABLET BY MOUTH DAILY AS NEEDED FOR ANXIETY  
  
 cephALEXin 500 mg capsule Commonly known as:  Lyndia Rad Take 1 Cap by mouth four (4) times daily for 10 days. esomeprazole 40 mg capsule Commonly known as:  NEXIUM  
TAKE 1 CAPSULE BY MOUTH EVERY DAY  
  
  
  
  
 Prescriptions Sent to Pharmacy Refills  
 cephALEXin (KEFLEX) 500 mg capsule 0 Sig: Take 1 Cap by mouth four (4) times daily for 10 days. Class: Normal  
 Pharmacy: [facility]/pharmacy #[id number] #: [phone] Route: Oral  
  
Patient Instructions 1) Please take the keflex twice a day for 10 days. This is an antibiotic and you should take all of it as directed until it is complete, even if you are feeling better. This prevents bacterial resistance. Eating healthy, drinking enough fluids (especially water) and getting enough sleep (8hrs) are also important to help you heal.  
Please use good handwashing technique and make sure you wash hands before and after eating. Use hand  if you are in a public place. Please monitor for signs or symptoms of infection including redness, warmth, swelling, discharge and/or fever/chills. Epsom salt soak will help reduce swelling and keep toe clean. Can do this 2-3x daily. Introducing [facility] HEALTH SERVICES! Dear Jhony Deleon: 
Thank you for requesting a [employer] account. Our records indicate that you already have an active [employer] account. You can access your account anytime at [email or web address] Did you know that you can access your hospital and ER discharge instructions at any time in [employer]? You can also review all of your test results from your hospital stay or ER visit. Additional Information If you have questions, please visit the Frequently Asked Questions section of the [employer] website at [email or web address] Remember, [employer] is NOT to be used for urgent needs. For medical emergencies, dial 911. Now available from your iPhone and Android! Please provide this summary of care documentation to your next provider. Your primary care clinician is listed as Derek Barajas.  If you have any questions after today's visit, please call [phone].

## 2018-05-21 NOTE — PROGRESS NOTES
S: David Haywood is a 38 y.o. male who presents for left big pain    Assessment/Plan:    1. Cellulitis, left great toe  -rx; keflex 500mg bid x10 days  -epsom salt soak, reviewed s/s of worsening cellulitis    2.  Elevated BP reading  -has white coat and anxiety about toe, advised if BP is above 140/90 at home, to make OV         HPI:  Left big toe  Onset: last Thursday 5/17/18  Quality: stabbing   Skin warm to touch/swollen - yes  No hx of gout  Was  Mowing grass in flip flops and a few days after noticed red toe  Also has red spots both lower legs    PHQ over the last two weeks 5/21/2018   Little interest or pleasure in doing things Not at all   Feeling down, depressed or hopeless Several days   Total Score PHQ 2 1   Trouble falling or staying asleep, or sleeping too much -   Feeling tired or having little energy -   Poor appetite or overeating -   Feeling bad about yourself - or that you are a failure or have let yourself or your family down -   Trouble concentrating on things such as school, work, reading or watching TV -   Moving or speaking so slowly that other people could have noticed; or the opposite being so fidgety that others notice -   Thoughts of being better off dead, or hurting yourself in some way -   PHQ 9 Score -       Social history:   Nutrition: good diet - fruits and veggies  Social: has 18month son, has a girlfriend   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     Hematospermia 3/09    Hematuria  Hip joint pain 10/18/10    dr Kirby Sender IBS (irritable bowel syndrome)     based on history    Melanoma of trunk ([address] 10/18/10     with Dr. Cadet Side fx [phone]    Panic disorder     Pulmonary nodule, right 5/09    4mm nodule right lung- incidental finding on CT scan Uro did for hematuria       Current Outpatient Prescriptions   Medication Sig Dispense Refill    ALPRAZolam (XANAX) 1 mg tablet TAKE 1 TABLET BY MOUTH DAILY AS NEEDED FOR ANXIETY 30 Tab 5    esomeprazole (NEXIUM) 40 mg capsule TAKE 1 CAPSULE BY MOUTH EVERY DAY 90 Cap 3       No Known Allergies     O: VS:   Visit Vitals    BP (!) 134/92 (BP 1 Location: Left arm, BP Patient Position: Sitting)    Pulse 96    Temp 98.2 °F (36.8 °C) (Oral)    Resp 18    Ht 5' 9\" (1.753 m)    Wt 194 lb 4.8 oz (88.1 kg)    SpO2 100%    BMI 28.69 kg/m2       GENERAL: Cory Camara is in no acute distress. Non-toxic. Well nourished. Well developed. Appropriately groomed. RESP: Breath sounds are symmetrical bilaterally. Unlabored without SOB. Speaking in full sentences. Clear to auscultation bilaterally anteriorly and posteriorly. No wheezes. No rales or rhonchi. CV: normal rate. Regular rhythm. S1, S2 audible. No murmur noted. No rubs, clicks or gallops noted. NEURO:  awake, alert and oriented to person, place, and time and event. Cranial nerves II through XII intact. Clear speech. Muscle strength is +5/5 x 4 extremities. Sensation is intact to light touch bilaterally. Steady gait  MUSC:  Intact x 4 extremities. ROM x 4 extremities. Left great toe: moderate edema and erythema, warm to touch. Pain with movement. SKIN: Skin is warm and dry. Turgor is normal.    [place] LE petechiae noted over shin area   PSYCH: appropriate behavior, dress and thought processes. Good eye contact. Clear and coherent speech. Full affect.  Good insight.         ______________________________________________________________________  Patient education was done. Advised on nutrition, physical activity, weight management, tobacco, alcohol and safety. Counseling included discussion of diagnosis, differentials, treatment options, prescribed treatment, warning signs and follow up. Medication risks/benefits,interactions and alternatives discussed with patient.      Patient verbalized understanding and agreed to plan of care. Patient was given an after visit summary which included current diagnoses, medications and vital signs. Follow up as needed or if symptoms progress.

## 2018-07-17 DIAGNOSIS — F41.0 PANIC ANXIETY SYNDROME: ICD-10-CM

## 2018-07-18 RX ORDER — ALPRAZOLAM 1 MG/1
TABLET ORAL
Qty: 30 TAB | OUTPATIENT
Start: 2018-07-18

## 2018-07-23 ENCOUNTER — OFFICE VISIT (OUTPATIENT)
Dept: FAMILY MEDICINE CLINIC | Age: 43
End: 2018-07-23

## 2018-07-23 VITALS — WEIGHT: 195 LBS | BODY MASS INDEX: 28.88 KG/M2 | RESPIRATION RATE: 18 BRPM | HEIGHT: 69 IN

## 2018-07-23 DIAGNOSIS — F41.0 PANIC ANXIETY SYNDROME: Primary | ICD-10-CM

## 2018-07-23 DIAGNOSIS — K21.9 GASTROESOPHAGEAL REFLUX DISEASE, ESOPHAGITIS PRESENCE NOT SPECIFIED: ICD-10-CM

## 2018-07-23 DIAGNOSIS — Z79.899 LONG-TERM USE OF HIGH-RISK MEDICATION: ICD-10-CM

## 2018-07-23 RX ORDER — ALPRAZOLAM 1 MG/1
TABLET ORAL
Qty: 30 TAB | Refills: 5 | Status: SHIPPED | OUTPATIENT
Start: 2018-07-23 | End: 2018-12-18 | Stop reason: SDUPTHER

## 2018-07-23 RX ORDER — DEXLANSOPRAZOLE 60 MG/1
60 CAPSULE, DELAYED RELEASE ORAL DAILY
Qty: 30 CAP | Refills: 0 | Status: SHIPPED | OUTPATIENT
Start: 2018-07-23 | End: 2018-10-22 | Stop reason: ALTCHOICE

## 2018-07-23 NOTE — PROGRESS NOTES
Chief Complaint   Patient presents with    Medication Refill     rm 6// NONfasting//         HPI:  The patient is a 38 y.o. male who presents today for a follow up appointment. No hospital, ER or specialist visits since last primary care visit except as noted below. GERD:  Has been taking Nexium 40mg daily, the GERD has been back off and on x 1 month. He has seen GI for his GERD w/ GIS. It has been [facility] while\" since he has seen them. He took [address] with good results but his insurance would not cover it. He c/o GERD throughout the day. He denies night time regurgitation. Anxiety:   Taking Xanax daily with improved anxiety. He also reports significant white coat syndrome and declines BP check today in the office. Review of Systems  A comprehensive review of systems was negative except for that written in the HPI.     Patient Active Problem List   Diagnosis Code    Panic anxiety syndrome F41.0    GERD (gastroesophageal reflux disease) K21.9    Pulmonary nodules/lesions, multiple R91.8    Melanoma of trunk ([address] C43.59    Low back pain M54.5       Past Medical History:   Diagnosis Date    Anxiety ~1997    Cancer [facility])     melanoma chest Dr Saud Pitt fx [phone]    Esophageal hiatal hernia 2/4/14    GERD (gastroesophageal reflux disease)     Endoscopies normal.     Hematospermia 3/09    Hematuria     Hip joint pain 10/18/10    dr Henry Sinclair IBS (irritable bowel syndrome)     based on history    Melanoma of trunk ([address] 10/18/10     with Dr. Benedicto Llanes fx [phone]    Panic disorder     Pulmonary nodule, right 5/09    4mm nodule right lung- incidental finding on CT scan Uro did for hematuria       Past Surgical History:   Procedure Laterality Date    HX ENDOSCOPY  2/4/14    EGD Dr Singh Agent HX ORTHOPAEDIC  5/1/07     herniated disc surg in [address]      mole right chest- now melanoma    HX OTHER SURGICAL  11/23/10    dr Mauricio Barnes for Melanoma excision    HX OTHER SURGICAL      STRETTA procedure. Done in [facility] [address] History   Substance Use Topics    Smoking status: Former Smoker     Packs/day: 1.00     Years: 9.00    Smokeless tobacco: Never Used    Alcohol use Yes      Comment: very rare, max 3-4 drinks at one time       Family History   Problem Relation Age of Onset    Heart Disease Father     Psychiatric Disorder Mother      suicide, lifelong severe anxiety    Cancer Maternal Grandfather        Outpatient Prescriptions Marked as Taking for the 7/23/18 encounter (Office Visit) with Osmar Boykin NP   Medication Sig Dispense Refill    ALPRAZolam (XANAX) 1 mg tablet TAKE 1 TABLET BY MOUTH DAILY AS NEEDED FOR ANXIETY 30 Tab 5    Dexlansoprazole (DEXILANT) 60 mg CpDB Take 1 Cap by mouth daily. 30 Cap 0       No Known Allergies    PE:  Visit Vitals    Resp 18    Ht 5' 9\" (1.753 m)    Wt 195 lb (88.5 kg)    BMI 28.8 kg/m2     Gen: alert, oriented, no acute distress  Head: normocephalic, atraumatic  Ears: external auditory canals clear, TMs without erythema or effusion  Eyes: pupils equal round reactive to light, sclera clear, conjunctiva clear  Oral: moist mucus membranes, no oral lesions, no pharyngeal inflammation or exudate  Neck: symmetric normal sized thyroid, no carotid bruits, no jugular vein distention  Resp: no increase work of breathing, lungs clear to ausculation bilaterally, no wheezing, rales or rhonchi  CV: S1, S2 normal.  No murmurs, rubs, or gallops. Abd: soft, not tender, not distended. No hepatosplenomegaly. Normal bowel sounds. No hernias. No abdominal or renal bruits. Neuro: cranial nerves intact, normal strength and movement in all extremities, reflexes and sensation intact and symmetric. Skin: no lesion or rash  Extremities: no cyanosis or edema    Assessment/Plan:    ICD-10-CM ICD-9-CM    1.  Panic anxiety syndrome F41.0 300.01 ALPRAZolam (XANAX) 1 mg tablet      COMPLIANCE DRUG SCREEN/PRESCRIPTION MONITORING   2. Gastroesophageal reflux disease, esophagitis presence not specified K21.9 530.81 Dexlansoprazole (DEXILANT) 60 mg CpDB   3. Long-term use of high-risk medication Z79.899 V58.69 COMPLIANCE DRUG SCREEN/PRESCRIPTION MONITORING     Follow-up Disposition:  Return in about 6 months (around 1/23/2019) for Medication Check, Anxiety, GERD. lab results and schedule of future lab studies reviewed with patient  reviewed diet, exercise and weight control  reviewed medications and side effects in detail  Pt referred back to his GI for further evaluation and treatment of his GERD symptoms with possible repeat EGD, pt goes to [facility] on [address] [facility]. Controlled substance plan and safety assessment:  Medication: Xanax 1mg  MME/day: N/A   >= 50? N/A   >= 120? N/A  Naloxone Rx? N/A   appropriate and last checked on: 7/23/2018  Last Urine Toxicology: 1/15/2018, appropriate; will recheck today  Treatment agreement was signed by pt and myself on: 7/23/2018    24 hour opioid dose >120mg morphine equivalent/day:  [] Yes   [x] No  Benzodiazepines:  [x] Yes   [] No  Sleep apnea:  [] Yes   [x] No  Urine Toxicology Testing within last 6 months:  [x] Yes   [] No  History of or new aberrant medication taking behaviors:  [] Yes   [x] No    Controlled Substance Refills given  Date prescription signed by me  medication  Amount Refills   7/23/2018  Xanax 1mg 1 PO daily 30 5   7/23/2018         Health Maintenance reviewed - deferred to PCP. Recommended healthy diet low in carbohydrates, fats, sodium and cholesterol. Recommended regular cardiovascular exercise 3-6 times per week for 30-60 minutes daily. Chart is reviewed and updated today in the office. Records requested for other providers patient has seen and is currently seeing. Patient was offered a choice/choices in the treatment plan today. Patient expresses understanding of the plan and agrees with recommendations.   Verbal and written instructions (see AVS) provided. See patient instructions for more. Patient expresses understanding of diagnosis and treatment plan.

## 2018-07-23 NOTE — PROGRESS NOTES
Patient presents for Controlled Substance Prescription follow up. Last prescription:  ALPRAZolam Alverta Jarod) 1 mg tablet [[id number]]   Order Details   Dose, Route, Frequency: As Directed    Dispense Quantity:  30 Tab Refills:  5 Fills Remaining:  --           Sig: TAKE 1 TABLET BY MOUTH DAILY AS NEEDED FOR ANXIETY          Written Date:  01/15/18 Expiration Date:  --     Start Date:  01/15/18 End Date:  --            Ordering Provider:  -- ELAINE #:  -- NPI:  --    Authorizing Provider:  Simba Ruiz MD ELAINE #:  [id number] NPI:  [id number]    Ordering User:  [employer] Kevyn Ruiz MD            Diagnosis Association: Panic anxiety syndrome (F41.0)      Original Order:  ALPRAZolam Alverta Jarod) 1 mg tablet [[id number]]      Pharmacy:  [facility]/pharmacy #[id number] [place] [id number] [address] #:  [id number]            Bottle matches last prescription. 0 pills remaining in bottle. Patient reports last dose taken at 8 am on July 20   printed. Urine collected. Controlled Substance Agreement letter printed.

## 2018-07-23 NOTE — LETTER
Mina Hernandez :1975 MR #:[id number] Provider Name:Dali JEROME Dalton MD  
*[id number]* BSMG-491 () Page 1 of 5 Initial [employer] CONTROLLED SUBSTANCE AGREEMENT I may be prescribed medications that are controlled substances as part  of my treatment plan for management of my medical condition(s). The goal of my treatment plan is to maintain and/or improve my health and wellbeing. Because controlled substances have an increased risk of abuse or harm, continual re-evaluation is needed determine if the goals of my treatment plan are being met for my safety and the safety of others. Dyan Kelley  am entering into this Controlled Substance Agreement with my provider, Nabila Dalton MD at [facility] . I understand that successful treatment requires mutual trust and honesty between me and my provider. I understand that there are state and federal laws and regulations which apply to the medications that my provider may prescribe that must be followed. I understand there are risks and benefits ts of taking the medicines that my provider may prescribe. I understand and agree that following this Agreement is necessary in continuing my provider-patient relationship and success of my treatment plan. As a part of my treatment plan, I agree to the following: COMMUNICATION: 
 
1. I will communicate fully with my provider about my medical condition(s), including the effect on my daily life and how well my medications are helping. I will tell my provider all of the medications that I take for any reason, including medications I receive from another health care provider, and will notify my provider about all issues, problems or concerns, including any side effects, which may be related to my medications. I understand that this information allows my provider to adjust my treatment plan to help manage my medical condition.  I understand that this information will become part of my permanent medical record. 2. I will notify my provider if I have a history of alcohol/drug misuse/addiction or if I have had treatment for alcohol/drug addiction in the past, or if I have a new problem with or concern about alcohol/drug use/addiction, because this increases the likelihood of high risk behaviors and may lead to serious medical conditions. 3. Females Only: I will notify my provider if I am or become pregnant, or if I intend to become pregnant, or if I intend to breastfeed. I understand that communication of these issues with my provider is important, due to possible effects my medication could have on an unborn fetus or breastfeeding child. Name:Nahed Hua :1975 MR #:[id number] Provider Name:Dali Lucio MD  
*QVDF-906* BSMG-491 () Page 2 of 5 Initial [employer] MISUSE OF MEDICATIONS / DRUGS: 
 
1. I agree to take all controlled substances as prescribed, and will not misuse or abuse any controlled substances prescribed by my provider. For my safety, I will not increase the amount of medicine I take without first talking with and getting permission from my provider. 2. If I have a medical emergency, another health care provider may prescribe me medication. If I seek emergency treatment, I will notify my provider within seventy-two (72) hours. 3. I understand that my provider may discuss my use and/or possible misuse/abuse of controlled substances and alcohol, as appropriate, with any health care provider involved in my care, pharmacist or legal authority. ILLEGAL DRUGS: 
 
1. I will not use illegal drugs of any kind, including but not limited to marijuana, heroin, cocaine, or any prescription drug which is not prescribed to me. DRUG DIVERSION / PRESCRIPTION FRAUD: 
 
1. I will not share, sell, trade, give away, or otherwise misuse my prescriptions or medications. 2. I will not alter any prescriptions provided to me by my provider. SINGLE PROVIDER: 
 
1. I agree that all controlled substances that I take will be prescribed only by my provider (or his/her covering provider) under this Agreement. This agreement does not prevent me from seeking emergency medical treatment or receiving pain management related to a surgery. PROTECTING MEDICATIONS: 
 
1. I am responsible for keeping my prescriptions and medications in a safe and secure place including safeguarding them from loss or theft. I understand that lost, stolen or damaged/destroyed prescriptions or medications will not be replaced. Name:. Harless Gottron :1975 MR #:[id number] Provider Name:Dali Pierson MD  
*KYUK-133* BSMG-491 () Page 3 of 5 Initial [employer] PRESCRIPTION RENEWALS/REFILLS: 
 
1. I will follow my controlled substance medication schedule as prescribed by my provider. 2. I understand and agree that I will make any requests for renewals or refills of my prescriptions only at the time of an office visit or during my providers regular office hours subject to the prescription refill requirements of the individual practice. 3. I understand that my provider may not call in prescriptions for controlled substances to my pharmacy. 4. I understand that my provider may adjust or discontinue these medications as deemed appropriate for my medical treatment plan. This Agreement does not guarantee the prescription of controlled medications. 5. I agree that if my medications are adjusted or discontinued, I will properly dispose of any remaining medications. I understand that I will be required to dispose of any remaining controlled medications prior to being provided with any prescriptions for other controlled medications.  
 
 
1. I authorize my provider and my pharmacy to cooperate fully with any local, state, or federal law enforcement agency in the investigation of any possible misuse, sale, or other diversion of my controlled substance prescriptions or medications. RISKS: 
 
 
1. I understand that if I do not adhere to this Agreement in any way, my provider may change my prescriptions, stop prescribing controlled substances or end our provider-patient relationship. 2. If my provider decides to stop prescribing medication, or decides to end our provider-patient relationship,my provider may require that I taper my medications slowly. If necessary, my provider may also provide a prescription for other medications to treat my withdrawal symptoms. UNDERSTANDING THIS AGREEMENT: 
 
I understand that my provider may adjust or stop my prescriptions for controlled substances based on my medical condition and my treatment plan. I understand that this Agreement does not guarantee that I will be prescribed medications or controlled substances. I understand that controlled substances may be just one part 
of my treatment plan.  
 
My initial on each page and my signature below shows that I have read each page of this Agreement, I have had an opportunity to ask questions, and all of my questions have been answered to my satisfaction by my provider. By signing below, I agree to comply with this Agreement, and I understand that if I do not follow the Agreements listed above, my provider may stop 
 
 
 
_________________________________________  Date/Time 7/23/2018 10:05 AM   
             (Patient Signature)

## 2018-07-23 NOTE — MR AVS SNAPSHOT
[address] 
[address] [place] [address] 
[phone] Patient: Alina Prieto MRN: [id number] :1975 Visit Information Date & Time Provider Department Dept. Phone Encounter #  
 2018  9:50 AM Rut Bird NP [facility] [phone] [id number] Follow-up Instructions Return in about 6 months (around 2019) for Medication Check, Anxiety, GERD. Upcoming Health Maintenance Date Due Influenza Age 5 to Adult 2018 Pneumococcal 19-64 Highest Risk (2 of 3 - PCV13) 1/15/2019 DTaP/Tdap/Td series (2 - Td) 1/15/2028 Allergies as of 2018  Review Complete On: 2018 By: Rut Bird NP No Known Allergies Current Immunizations  Reviewed on 2018 Name Date Td 2004 Reviewed by Francisco Thomas LPN on 992 at 47:80 AM  
You Were Diagnosed With   
  
 Codes Comments Panic anxiety syndrome    -  Primary ICD-10-CM: F41.0 ICD-9-CM: 300.01 Gastroesophageal reflux disease, esophagitis presence not specified     ICD-10-CM: K21.9 ICD-9-CM: 530.81 Long-term use of high-risk medication     ICD-10-CM: Z79.899 ICD-9-CM: V58.69 Vitals Resp Height(growth percentile) Weight(growth percentile) BMI Smoking Status 18 5' 9\" (1.753 m) 195 lb (88.5 kg) 28.8 kg/m2 Former Smoker Vitals History BMI and BSA Data Body Mass Index Body Surface Area  
 28.8 kg/m 2 2.08 m 2 Preferred Pharmacy Pharmacy Name Phone [id number] [address] [phone] Your Updated Medication List  
  
   
This list is accurate as of 18 10:32 AM.  Always use your most recent med list.  
  
  
  
  
 ALPRAZolam 1 mg tablet Commonly known as:  XANAX  
TAKE 1 TABLET BY MOUTH DAILY AS NEEDED FOR ANXIETY Dexlansoprazole 60 mg Cpdb Commonly known as:  DEXILANT Take 1 Cap by mouth daily. Prescriptions Printed Refills ALPRAZolam (XANAX) 1 mg tablet 5 Sig: TAKE 1 TABLET BY MOUTH DAILY AS NEEDED FOR ANXIETY Class: Print Prescriptions Sent to Pharmacy Refills Dexlansoprazole (DEXILANT) 60 mg CpDB 0 Sig: Take 1 Cap by mouth daily. Class: Normal  
 Pharmacy: [id number] [place], [address] [place] [address] #: [phone] Route: Oral  
  
We Performed the Following [facility] MONITORING [[id number] Custom] Follow-up Instructions Return in about 6 months (around 1/23/2019) for Medication Check, Anxiety, GERD. Patient Instructions Gastroesophageal Reflux Disease (GERD): Care Instructions Your Care Instructions Gastroesophageal reflux disease (GERD) is the backward flow of stomach acid into the esophagus. The esophagus is the tube that leads from your throat to your stomach. A one-way valve prevents the stomach acid from moving up into this tube. When you have GERD, this valve does not close tightly enough. If you have mild GERD symptoms including heartburn, you may be able to control the problem with antacids or over-the-counter medicine. Changing your diet, losing weight, and making other lifestyle changes can also help reduce symptoms. Follow-up care is a key part of your treatment and safety. Be sure to make and go to all appointments, and call your doctor if you are having problems. It's also a good idea to know your test results and keep a list of the medicines you take. How can you care for yourself at home? · Take your medicines exactly as prescribed. Call your doctor if you think you are having a problem with your medicine. · Your doctor may recommend over-the-counter medicine.  For mild or occasional indigestion, antacids, such as Tums, Gaviscon, Mylanta, or Maalox, may help. Your doctor also may recommend over-the-counter acid reducers, such as Pepcid AC, Tagamet HB, Zantac 75, or Prilosec. Read and follow all instructions on the label. If you use these medicines often, talk with your doctor. · Change your eating habits. ¨ It's best to eat several small meals instead of two or three large meals. ¨ After you eat, wait 2 to 3 hours before you lie down. ¨ Chocolate, mint, and alcohol can make GERD worse. ¨ Spicy foods, foods that have a lot of acid (like tomatoes and oranges), and coffee can make GERD symptoms worse in some people. If your symptoms are worse after you eat a certain food, you may want to stop eating that food to see if your symptoms get better. · Do not smoke or chew tobacco. Smoking can make GERD worse. If you need help quitting, talk to your doctor about stop-smoking programs and medicines. These can increase your chances of quitting for good. · If you have GERD symptoms at night, raise the head of your bed 6 to 8 inches by putting the frame on blocks or placing a foam wedge under the head of your mattress. (Adding extra pillows does not work.) · Do not wear tight clothing around your middle. · Lose weight if you need to. Losing just 5 to 10 pounds can help. When should you call for help? Call your doctor now or seek immediate medical care if: 
  · You have new or different belly pain.  
  · Your stools are black and tarlike or have streaks of blood.  
 Watch closely for changes in your health, and be sure to contact your doctor if: 
  · Your symptoms have not improved after 2 days.  
  · Food seems to catch in your throat or chest.  
Where can you learn more? Go to http://felton-sharon.info/. Enter V162 in the search box to learn more about \"Gastroesophageal Reflux Disease (GERD): Care Instructions. \" Current as of: May 12, 2017 Content Version: 11.7 © 8654-1416 Lydia, [employer].  Care instructions adapted under license by [address] (which disclaims liability or warranty for this information). If you have questions about a medical condition or this instruction, always ask your healthcare professional. [address] any warranty or liability for your use of this information. Introducing [facility]! Dear Cece Ny: 
Thank you for requesting a [employer] account. Our records indicate that you already have an active [employer] account. You can access your account anytime at [email or web address] Did you know that you can access your hospital and ER discharge instructions at any time in [employer]? You can also review all of your test results from your hospital stay or ER visit. Additional Information If you have questions, please visit the Frequently Asked Questions section of the [employer] website at [email or web address] Remember, [employer] is NOT to be used for urgent needs. For medical emergencies, dial 911. Now available from your iPhone and Android! Please provide this summary of care documentation to your next provider. Your primary care clinician is listed as Sergei Diego. If you have any questions after today's visit, please call [phone].

## 2018-07-29 LAB — DRUGS UR: NORMAL

## 2018-12-12 RX ORDER — ESOMEPRAZOLE MAGNESIUM 40 MG/1
CAPSULE, DELAYED RELEASE ORAL
Qty: 30 CAP | Refills: 0 | Status: SHIPPED | OUTPATIENT
Start: 2018-12-12 | End: 2019-01-10 | Stop reason: SDUPTHER

## 2018-12-12 NOTE — TELEPHONE ENCOUNTER
Requested Prescriptions     Pending Prescriptions Disp Refills    esomeprazole (NEXIUM) 40 mg capsule 30 Cap 0     Appointment scheduled with Sveta Weinberg on 12/18/2018.

## 2018-12-12 NOTE — TELEPHONE ENCOUNTER
PCP: Say Zapata., MD    Last appt: 7/23/2018  Future Appointments   Date Time Provider Juan Jose Barriga   12/18/2018  1:30 PM Nadia Spencer NP [facility]       Requested Prescriptions     Pending Prescriptions Disp Refills    esomeprazole (NEXIUM) 40 mg capsule 30 Cap 0       Prior labs and Blood pressures:  BP Readings from Last 3 Encounters:   05/21/18 (!) 134/92   01/15/18 131/89   10/31/17 134/76     Lab Results   Component Value Date/Time    Sodium 143 10/31/2017 10:45 AM    Potassium 4.4 10/31/2017 10:45 AM    Chloride 103 10/31/2017 10:45 AM    CO2 29 10/31/2017 10:45 AM    Glucose 102 (H) 10/31/2017 10:45 AM    BUN 5 (L) 10/31/2017 10:45 AM    Creatinine 0.78 10/31/2017 10:45 AM    BUN/Creatinine ratio 6 (L) 10/31/2017 10:45 AM    GFR est  10/31/2017 10:45 AM    GFR est non- 10/31/2017 10:45 AM    Calcium 9.9 10/31/2017 10:45 AM     Lab Results   Component Value Date/Time    Hemoglobin A1c 4.8 10/17/2017 11:38 AM    Hemoglobin A1c (POC) 4.9 05/08/2012 12:36 PM     Lab Results   Component Value Date/Time    Cholesterol, total 155 10/17/2017 11:38 AM    HDL Cholesterol 38 (L) 10/17/2017 11:38 AM    LDL, calculated 101 (H) 10/17/2017 11:38 AM    VLDL, calculated 16 10/17/2017 11:38 AM    Triglyceride 79 10/17/2017 11:38 AM     Lab Results   Component Value Date/Time    Vitamin D 25-Hydroxy 37.0 09/06/2011 01:56 PM    VITAMIN D, 25-HYDROXY 36.0 10/12/2015 02:29 PM       Lab Results   Component Value Date/Time    TSH 2.240 09/27/2010 10:11 AM

## 2018-12-18 ENCOUNTER — OFFICE VISIT (OUTPATIENT)
Dept: FAMILY MEDICINE CLINIC | Age: 43
End: 2018-12-18

## 2018-12-18 VITALS
RESPIRATION RATE: 18 BRPM | HEIGHT: 69 IN | SYSTOLIC BLOOD PRESSURE: 151 MMHG | BODY MASS INDEX: 29.36 KG/M2 | DIASTOLIC BLOOD PRESSURE: 106 MMHG | TEMPERATURE: 97 F | WEIGHT: 198.2 LBS | HEART RATE: 106 BPM | OXYGEN SATURATION: 97 %

## 2018-12-18 DIAGNOSIS — F41.0 PANIC ANXIETY SYNDROME: Primary | ICD-10-CM

## 2018-12-18 RX ORDER — ALPRAZOLAM 1 MG/1
TABLET ORAL
Qty: 30 TAB | Refills: 5 | Status: SHIPPED | OUTPATIENT
Start: 2018-12-18 | End: 2019-07-15 | Stop reason: SDUPTHER

## 2018-12-18 NOTE — PATIENT INSTRUCTIONS
1)  Ask the GI specialist about getting dexilant ordered or different GERD med. The esomeprazole won't be covered by insurance starting in January. 2) Refill on xanax 1mg   Xanax is a controlled substance and needs to be monitored carefully   Anxiety is a common problem. It affects all kinds of people. There is no reason to feel embarrassed about getting treatment for anxiety. Whether you have occasional anxiety or a diagnosable disorder, there are steps you can take to help minimize and manage feeling of anxiety. Everyone feels anxious or nervous once in a while. That is normal. But being extremely anxious or worried on most days for 6 months or longer is not normal, and is considered a medical problem. This is called \"generalized anxiety disorder. \" The disorder can make it hard to do everyday tasks. Generalized anxiety disorder is just one anxiety disorder. There are others, such as panic disorder and phobias. Symptoms of extreme or severe anxiety:  People with extreme or severe anxiety feel very worried or \"on edge\" much of the time. They can have trouble sleeping or forget things. Plus, they can have physical symptoms. For instance, people with severe anxiety often feel very tired and have tense muscles. Some get stomach aches or feel chest \"tightness. \"    Steps you can do on your own to feel better:  Deep breathing exercises: Deep breathing triggers a relaxation response, helping to change from the \"fight-or-flight\" response anxiety brings on. Inhale slowly to a count of 4, starting at your belly and then moving into your chest.  Gently hold your breath for 4 counts, then slowly exhale to 4 counts. \"Clench\" exercise - clench various zones in your body for 30 seconds, then an overall body clench  Exercise can help many people feel less anxious. Regular cardiovascular exercise (such as fast walking,) release endorphins - the \"feel good\" hormone in our body - and can reduce anxiety.   Proper sleep:  Sleep is important to overall health. Not getting enough sleep can cause fatigue, inattention, and irritabililty, causing anxiety levels to increase. Healthy Diet: a healthy diet rich in whole grains, vegetables and fruits is healthier than simple carbohydrates found in processed foods. Skipping meals can cause blood sugar to drop and cause jittery feelings that could worsening underlying anxiety. It also a good idea to cut down on or stop drinking coffee and other sources of caffeine. Caffeine can make anxiety worse. Medical treatments include:  ? Psychotherapy - Psychotherapy involves meeting with a mental health counselor to talk about your feelings, relationships, and worries. Therapy can help you find new ways of thinking about your situation so that you feel less anxious. In therapy, you might also learn new skills to reduce anxiety. Please call your insurance company to get a list of therapists that will be covered and make an appointment. Alternatively, you can go to Psychology Today's website to find a counselor. · Go to www. Orthocon  · Enter your zip code. · Click on your insurance carrier (usually on left side of screen). · Then click any other parameters you desire.      This will result in a list of providers. Click on any provider to learn more about them or see the contact information. Please choose a provider, call them, and schedule an appointment. ?Medicines - Medicines used to treat depression can relieve anxiety, too, even in people who are not depressed. Some people have psychotherapy and take medicines at the same time. Phone apps that can help with anxiety:  CALM - Use the principles of mindfulness and meditation to ease your mind and keep anxiety at [facility] Miah 994. The SleepOut interface is just the beginning. Once it opens, there are relaxing sounds and sleep stories.  Enjoy guided meditations at various lengths to help with everything from building self-esteem to calming anxiety. NELI - Self-help for Anxiety Management - range of self-help methods. Helps you understand what causes your anxiety, monitor your anxious thoughts and behavior over time and manage your anxiety through self-help exercises and private reflection. BOOSTERBUDDY   This rachele offers a novel way for teens and young adults to improve their resilience and work toward being healthier both physically and emotionally. 7 CUPS  7 Cups uses trained, volunteer, active listeners to provide free, anonymous, and confidential emotional support to people needing help coping with acute stressors and long-term mental health issues. Pin [facility] 82 apps provide people with quick resources for tracking their cognitive distortions, activities, and safety plan in case of an emergency. SLEEPBOT   SleepBot is a quick and simple way for people to log their sleep and improve their sleep hygiene. [facility] RACHELE   The Whats Up? rachele helps people monitor their mood and uses principles of CBT and acceptance commitment therapy (ACT) to help people reframe their thoughts and cognitive distortions. Keep the numbers for these national suicide hotlines: 4-002-840-TALK (1-582.714.4411) and 2-542-BYSGTBD (5-430.563.1723). If you or someone you know talks about suicide or feeling hopeless, get help right away. [address] [place] [address] St: [phone]  Crisis: [address] [place] [address] Avenue: [phone]  Crisis: [address]: [phone]  Crisis: [phone]         Panic Attacks: Care Instructions  Your Care Instructions    During a panic attack, you may have a feeling of intense fear or terror, trouble breathing, chest pain or tightness, heartbeat changes, dizziness, sweating, and shaking.  A panic attack starts suddenly and usually lasts from 5 to 20 minutes but may last even longer. You have the most anxiety about 10 minutes after the attack starts. An attack can begin with a stressful event, or it can happen without a cause. Although panic attacks can cause scary symptoms, you can learn to manage them with self-care, counseling, and medicine. Follow-up care is a key part of your treatment and safety. Be sure to make and go to all appointments, and call your doctor if you are having problems. It's also a good idea to know your test results and keep a list of the medicines you take. How can you care for yourself at home? · Take your medicine exactly as directed. Call your doctor if you think you are having a problem with your medicine. · Go to your counseling sessions and follow-up appointments. · Recognize and accept your anxiety. Then, when you are in a situation that makes you anxious, say to yourself, \"This is not an emergency. I feel uncomfortable, but I am not in danger. I can keep going even if I feel anxious. \"  · Be kind to your body:  ? Relieve tension with exercise or a massage. ? Get enough rest.  ? Avoid alcohol, caffeine, nicotine, and illegal drugs. They can increase your anxiety level, cause sleep problems, or trigger a panic attack. ? Learn and do relaxation techniques. See below for more about these techniques. · Engage your mind. Get out and do something you enjoy. Go to a funny movie, or take a walk or hike. Plan your day. Having too much or too little to do can make you anxious. · Keep a record of your symptoms. Discuss your fears with a good friend or family member, or join a support group for people with similar problems. Talking to others sometimes relieves stress. · Get involved in social groups, or volunteer to help others. Being alone sometimes makes things seem worse than they are. · Get at least 30 minutes of exercise on most days of the week to relieve stress. Walking is a good choice.  You also may want to do other activities, such as running, swimming, cycling, or playing tennis or team sports. Relaxation techniques  Do relaxation exercises for 10 to 20 minutes a day. You can play soothing, relaxing music while you do them, if you wish. · Tell others in your house that you are going to do your relaxation exercises. Ask them not to disturb you. · Find a comfortable place, away from all distractions and noise. · Lie down on your back, or sit with your back straight. · Focus on your breathing. Make it slow and steady. · Breathe in through your nose. Breathe out through either your nose or mouth. · Breathe deeply, filling up the area between your navel and your rib cage. Breathe so that your belly goes up and down. · Do not hold your breath. · Breathe like this for 5 to 10 minutes. Notice the feeling of calmness throughout your whole body. As you continue to breathe slowly and deeply, relax by doing the following for another 5 to 10 minutes:  · Tighten and relax each muscle group in your body. You can begin at your toes and work your way up to your head. · Imagine your muscle groups relaxing and becoming heavy. · Empty your mind of all thoughts. · Let yourself relax more and more deeply. · Become aware of the state of calmness that surrounds you. · When your relaxation time is over, you can bring yourself back to alertness by moving your fingers and toes and then your hands and feet and then stretching and moving your entire body. Sometimes people fall asleep during relaxation, but they usually wake up shortly afterward. · Always give yourself time to return to full alertness before you drive a car or do anything that might cause an accident if you are not fully alert. Never play a relaxation tape while driving a car. When should you call for help? Call 911 anytime you think you may need emergency care. For example, call if:    · You feel you cannot stop from hurting yourself or someone else.  Watch closely for changes in your health, and be sure to contact your doctor if:    · Your panic attacks get worse.     · You have new or different anxiety.     · You are not getting better as expected. Where can you learn more? Go to http://felton-sharon.info/. Enter H601 in the search box to learn more about \"Panic Attacks: Care Instructions. \"  Current as of: December 7, 2017  Content Version: 11.8  © 2150-2538 Healthwise, [employer]. Care instructions adapted under license by [employer] (which disclaims liability or warranty for this information). If you have questions about a medical condition or this instruction, always ask your healthcare professional. [address] any warranty or liability for your use of this information.

## 2018-12-18 NOTE — PROGRESS NOTES
S: Nirali Ro is a 37 y.o. male who presents for anxiety follow up    Assessment/Plan:  1. Panic anxiety syndrome  -has tried numerous meds in past w/o success; xanax daily is the only thing that has been successful  -PHQ9 score = 8; GAD7 score = 11  - ALPRAZolam (XANAX) 1 mg tablet; TAKE 1 TABLET BY MOUTH DAILY AS NEEDED FOR ANXIETY  Dispense: 30 Tab; Refill: 5    2. Elevated BP reading  -pt states he always have elevated BP; WCS and usually declines to have BP taken       HPI:  Anxiety attack when he went to [place] for holiday season   Current therapy: xanax 1mg daily - sometimes need 2 tabs a day, depending on what is happening in his day; sometimes tries to not take one tab if nothing to do   Tried many meds with Dr. Maylin Marcail (lexapro and inderal, zoloft, buspirone, paxil - but none of them worked for him; xanax is the only thing that has worked for him  Lives close to work, sometimes has girlfriend drive him to work, needs hospital close by for peace of mind  Anxiety in early 's. Went away for a few years and then crept back in over time   Has seen therapist and counseling  Has tried breathing tx   Always has xanax with him, but usually doesn't take it until he gets to work - feels like he will pass out - xanax allows him to control the urge to run or escape from     + anxiety  No excessive fatigue  + panic attacks  No sleep disturbance  No  crying spells   No delusions or hallucinations    No suicidal ideation. No homicidal ideation. PHQ-9 Score: 8  Over the past 2 weeks, how often have you been bothered by any of the following problems?  (Not at all = 0; several days = 1; More than 1/2 the days = 2; nearly every day = 3)  1) Little interest or pleasure in doing things:  1  2) Feeling down, depressed or hopeless: 1  3) Trouble falling asleep, staying asleep or sleeping too much: 0  4) Feeling tired or having little energy: 2  5) Poor appetite or overeatin  6) Feeling bad about yourself - or that you're a failure or have let yourself or your family down: 1  7) Trouble concentrating on things, such as reading the newspaper or watching TV:  2  8) Moving or speaking so slowly that other people could have noticed. Or, the opposite - being so fidgety or restless that you have been moving around a lot more than usual: 0  9) Thoughts that you would be better off dead or of hurting yourself in some way: 0    GAD7 score: 11  Feeling nervous, anxious or on edge:   3  Not being able to stop or control worryin  Worrying too much about different things:1  Trouble relaxin  Being so restless that it is hard to sit still:   1  Becoming easily annoyed or irritable:  1  Feeling afraid as if something awful might happen: 2  If any of the above were scored more than 0, how difficult have these problems made it for you to do your work, take care of things at home, or get along with other people? 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      Review of Systems:  - Constitutional Symptoms: no fevers, chills, weight loss  - Cardiovascular:  + palpitations no chest pain  - Respiratory: no cough or shortness of breath  - Gastrointestinal: no dysphagia or abdominal pain  - Neurological: no numbness, tingling, or headaches  I reviewed the following:  Past Medical History:   Diagnosis Date    Anxiety ~    Cancer [facility])     melanoma chest Dr Sherley Armando fx [phone]    Esophageal hiatal hernia 2/4/14    GERD (gastroesophageal reflux disease)     Endoscopies normal.     Hematospermia 3/09    Hematuria     Hip joint pain 10/18/10    dr Saud Pond IBS (irritable bowel syndrome)     based on history    Melanoma of trunk ([address] 10/18/10     with Dr. Kermit Morales fx [phone]    Panic disorder     Pulmonary nodule, right 5/09    4mm nodule right lung- incidental finding on CT scan Uro did for hematuria       Current Outpatient Medications   Medication Sig Dispense Refill    esomeprazole (NEXIUM) 40 mg capsule TAKE 1 CAPSULE BY MOUTH EVERY DAY 30 Cap 0    ALPRAZolam (XANAX) 1 mg tablet TAKE 1 TABLET BY MOUTH DAILY AS NEEDED FOR ANXIETY 30 Tab 5       No Known Allergies    O: VS:   Visit Vitals  BP (!) 151/106 (BP 1 Location: Right arm, BP Patient Position: Sitting)   Pulse (!) 106   Temp 97 °F (36.1 °C) (Oral)   Resp 18   Ht 5' 9\" (1.753 m)   Wt 198 lb 3.2 oz (89.9 kg)   SpO2 97%   BMI 29.27 kg/m²       GENERAL: Arabic Mas is in no acute distress. Non-toxic. Well nourished. Well developed. Appropriately groomed. RESP: Breath sounds are symmetrical bilaterally. Unlabored without SOB. Speaking in full sentences. Clear to auscultation bilaterally anteriorly and posteriorly. No wheezes. No rales or rhonchi. CV: normal rate. Regular rhythm. S1, S2 audible. No murmur noted. No rubs, clicks or gallops noted. NEURO:  awake, alert and oriented to person, place, and time and event. Clear speech. Muscle strength is +5/5 x 4 extremities. Sensation is intact to light touch bilaterally. Steady gait. PSYCH: appropriate behavior, dress and thought processes. Good eye contact. Clear and coherent speech. Full affect. Good insight.   ____________________________________________________________________  Patient education was done.   Advised on nutrition, physical activity, weight management, tobacco, alcohol and safety, including suicide hotline. Counseling included discussion of diagnosis, differentials, treatment options, prescribed treatment, warning signs and follow up. Medication risks/benefits, costs interactions and alternatives discussed with patient.       Patient agreed to plan of care and verbalized understanding. Patient was given an after visit summary which included current diagnoses, medications and vital signs.

## 2018-12-18 NOTE — PROGRESS NOTES
[facility]  Identified pt with two pt identifiers(name and ). Chief Complaint   Patient presents with    Medication Evaluation     Rm 10/medication refill       1. Have you been to the ER, urgent care clinic since your last visit? no  Hospitalized since your last visit? no    2. Have you seen or consulted any other health care providers outside of the [address] [place] [address] since your last visit? Include any pap smears or colon screening. no      Advance Care Planning    In the event something were to happen to you and you were unable to speak on your behalf, do you have an Advance Directive/ Living Will in place stating your wishes? no    If yes, do we have a copy on file NO    If no, would you like information NO    Medication reconciliation up to date and corrected with patient at this time. Today's provider has been notified of reason for visit, vitals and flowsheets obtained on patients. Reviewed record in preparation for visit, huddled with provider and have obtained necessary documentation. There are no preventive care reminders to display for this patient.     Wt Readings from Last 3 Encounters:   18 198 lb 3.2 oz (89.9 kg)   18 195 lb (88.5 kg)   18 194 lb 4.8 oz (88.1 kg)     Temp Readings from Last 3 Encounters:   18 97 °F (36.1 °C) (Oral)   18 98.2 °F (36.8 °C) (Oral)   01/15/18 96.3 °F (35.7 °C) (Oral)     BP Readings from Last 3 Encounters:   18 (!) 151/106   18 (!) 134/92   01/15/18 131/89     Pulse Readings from Last 3 Encounters:   18 (!) 106   18 96   01/15/18 88     Vitals:    18 1346   BP: (!) 151/106   Pulse: (!) 106   Resp: 18   Temp: 97 °F (36.1 °C)   TempSrc: Oral   SpO2: 97%   Weight: 198 lb 3.2 oz (89.9 kg)   Height: 5' 9\" (1.753 m)   PainSc:   0 - No pain         Learning Assessment:  :     Learning Assessment 2018   PRIMARY LEARNER Patient Patient   HIGHEST LEVEL OF EDUCATION - PRIMARY LEARNER  - GRADUATED HIGH SCHOOL OR GED   BARRIERS PRIMARY LEARNER - NONE   CO-LEARNER CAREGIVER - No   PRIMARY LANGUAGE ENGLISH ENGLISH   LEARNER PREFERENCE PRIMARY DEMONSTRATION VIDEOS   ANSWERED BY Patient Patient   RELATIONSHIP SELF SELF       Depression Screening:  :     PHQ over the last two weeks 12/18/2018   Little interest or pleasure in doing things Not at all   Feeling down, depressed, irritable, or hopeless Not at all   Total Score PHQ 2 0   Trouble falling or staying asleep, or sleeping too much -   Feeling tired or having little energy -   Poor appetite, weight loss, or overeating -   Feeling bad about yourself - or that you are a failure or have let yourself or your family down -   Trouble concentrating on things such as school, work, reading, or watching TV -   Moving or speaking so slowly that other people could have noticed; or the opposite being so fidgety that others notice -   Thoughts of being better off dead, or hurting yourself in some way -   PHQ 9 Score -       No flowsheet data found. Fall Risk Assessment:  :     No flowsheet data found. Abuse Screening:  :     Abuse Screening Questionnaire 12/18/2018   Do you ever feel afraid of your partner? N   Are you in a relationship with someone who physically or mentally threatens you? N   Is it safe for you to go home?  Y       ADL Screening:  :     ADL Assessment 7/23/2018   Feeding yourself No Help Needed   Getting from bed to chair No Help Needed   Getting dressed No Help Needed   Bathing or showering No Help Needed   Walk across the room (includes cane/walker) No Help Needed   Using the telphone No Help Needed   Taking your medications No Help Needed   Preparing meals No Help Needed   Managing money (expenses/bills) No Help Needed   Moderately strenuous housework (laundry) No Help Needed   Shopping for personal items (toiletries/medicines) No Help Needed   Shopping for groceries No Help Needed   Driving No Help Needed   Climbing a flight of stairs No Help Needed   Getting to places beyond walking distances No Help Needed           BMI:  Weight Metrics 12/18/2018 7/23/2018 5/21/2018 1/15/2018 10/31/2017 10/17/2017 7/10/2017   Weight 198 lb 3.2 oz 195 lb 194 lb 4.8 oz 193 lb 199 lb 1.6 oz 193 lb 198 lb   BMI 29.27 kg/m2 28.8 kg/m2 28.69 kg/m2 28.5 kg/m2 29.4 kg/m2 28.5 kg/m2 29.24 kg/m2       Patient presents for Controlled Substance Prescription follow up. Last prescription:  Alprazolam 1mg tablet  Take 1 tablet by mouth every day as needed for anxiety    Bottle matches last prescription. 0 pills remaining in bottle. Patient reports last dose taken at 11:00 AM on December 18, 2018   printed. Urine collected. Controlled Substance Agreement letter printed. Medication reconciliation up to date and corrected with patient at this time.

## 2019-02-11 NOTE — TELEPHONE ENCOUNTER
PCP: Jakub Leon MD    Last appt: 12/18/2018  No future appointments.     Requested Prescriptions     Pending Prescriptions Disp Refills    esomeprazole (NEXIUM) 40 mg capsule [Pharmacy Med Name: ESOMEPRAZOLE MAG DR 40 MG CAP] 30 Cap 0     Sig: TAKE 1 CAPSULE BY MOUTH EVERY DAY       Prior labs and Blood pressures:  BP Readings from Last 3 Encounters:   12/18/18 (!) 151/106   05/21/18 (!) 134/92   01/15/18 131/89     Lab Results   Component Value Date/Time    Sodium 143 10/31/2017 10:45 AM    Potassium 4.4 10/31/2017 10:45 AM    Chloride 103 10/31/2017 10:45 AM    CO2 29 10/31/2017 10:45 AM    Glucose 102 (H) 10/31/2017 10:45 AM    BUN 5 (L) 10/31/2017 10:45 AM    Creatinine 0.78 10/31/2017 10:45 AM    BUN/Creatinine ratio 6 (L) 10/31/2017 10:45 AM    GFR est  10/31/2017 10:45 AM    GFR est non- 10/31/2017 10:45 AM    Calcium 9.9 10/31/2017 10:45 AM     Lab Results   Component Value Date/Time    Hemoglobin A1c 4.8 10/17/2017 11:38 AM    Hemoglobin A1c (POC) 4.9 05/08/2012 12:36 PM     Lab Results   Component Value Date/Time    Cholesterol, total 155 10/17/2017 11:38 AM    HDL Cholesterol 38 (L) 10/17/2017 11:38 AM    LDL, calculated 101 (H) 10/17/2017 11:38 AM    VLDL, calculated 16 10/17/2017 11:38 AM    Triglyceride 79 10/17/2017 11:38 AM     Lab Results   Component Value Date/Time    Vitamin D 25-Hydroxy 37.0 09/06/2011 01:56 PM    VITAMIN D, 25-HYDROXY 36.0 10/12/2015 02:29 PM       Lab Results   Component Value Date/Time    TSH 2.240 09/27/2010 10:11 AM

## 2019-02-12 RX ORDER — ESOMEPRAZOLE MAGNESIUM 40 MG/1
CAPSULE, DELAYED RELEASE ORAL
Qty: 30 CAP | Refills: 0 | Status: SHIPPED | OUTPATIENT
Start: 2019-02-12 | End: 2019-03-11 | Stop reason: ALTCHOICE

## 2019-03-05 ENCOUNTER — TELEPHONE (OUTPATIENT)
Dept: FAMILY MEDICINE CLINIC | Age: 44
End: 2019-03-05

## 2019-03-05 NOTE — TELEPHONE ENCOUNTER
Anita with [employer] called, stated they will be sending over a prior authorization for dexilant 60 mg, writer verified two patient identifiers name and , advised Barbi Chun that patient is no longer taking dexilant 60 mg, nexium replaced that. Barbi Chun indicated that nexium was not working for patient. Writer called patient, two patient identifiers used for patient verification, patient is requesting that he be put back on Dexilant 60mg since it worked better for him, advised would let PCP know.

## 2019-03-08 NOTE — TELEPHONE ENCOUNTER
Writer did prior authorization for Dexilant 60mg 1 cap PO daily with Jose Beauchamp from [employer]. Patient's name and  verified. Case number given to Jose Beauchamp. Medication was approved from 02/15/2019 to 2020. Per Jose Beauchamp, office and patient will receive approval notification, and pharmacy can now re-process medication and co-pay will be preferred medication cost. Writer verbalized understanding and appreciation.

## 2019-03-08 NOTE — TELEPHONE ENCOUNTER
Writer called patient to notify him of medication approval. Writer spoke with patient. Patient verified . Writer notified patient, and stated that he can call the pharmacy and tell them to re-process medication. Writer told patient that per Pamela Mondragon at [email or web address], medication co-pay should now cost what a preferred medication would. Patient verbalized understanding and appreciation.

## 2019-03-11 DIAGNOSIS — K21.9 GASTROESOPHAGEAL REFLUX DISEASE, ESOPHAGITIS PRESENCE NOT SPECIFIED: ICD-10-CM

## 2019-03-11 RX ORDER — DEXLANSOPRAZOLE 60 MG/1
60 CAPSULE, DELAYED RELEASE ORAL DAILY
Qty: 90 CAP | Refills: 3 | Status: SHIPPED | OUTPATIENT
Start: 2019-03-11 | End: 2020-03-18

## 2019-03-11 NOTE — TELEPHONE ENCOUNTER
Writer informed phamacy that patient dexilant 60 mg approved through 02/15/2019 through 03/07/2020. They will inform patient. Nothing further.

## 2019-04-16 ENCOUNTER — OFFICE VISIT (OUTPATIENT)
Dept: URGENT CARE | Age: 44
End: 2019-04-16

## 2019-04-16 VITALS
TEMPERATURE: 97.5 F | HEART RATE: 86 BPM | SYSTOLIC BLOOD PRESSURE: 150 MMHG | BODY MASS INDEX: 30.07 KG/M2 | WEIGHT: 203 LBS | OXYGEN SATURATION: 98 % | HEIGHT: 69 IN | DIASTOLIC BLOOD PRESSURE: 90 MMHG | RESPIRATION RATE: 18 BRPM

## 2019-04-16 DIAGNOSIS — S20.212A CONTUSION OF LEFT CHEST WALL, INITIAL ENCOUNTER: Primary | ICD-10-CM

## 2019-04-16 DIAGNOSIS — R07.81 PLEURITIC CHEST PAIN: ICD-10-CM

## 2019-04-16 RX ORDER — NAPROXEN 500 MG/1
500 TABLET ORAL
Qty: 30 TAB | Refills: 0 | Status: SHIPPED | OUTPATIENT
Start: 2019-04-16
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
## 2019-04-16 NOTE — PATIENT INSTRUCTIONS
Deep breathing exercises     Chest Contusion: Care Instructions  Your Care Instructions  A chest contusion, or bruise, is caused by a fall or direct blow to the chest. Car crashes, falls, getting punched, and injury from bicycle handlebars are common causes of chest contusions. A very forceful blow to the chest can injure the heart or blood vessels in the chest, the lungs, the airway, the liver, or the spleen. Pain may be caused by an injury to muscles, cartilage, or ribs. Deep breathing, coughing, or sneezing can increase your pain. Lying on the injured area also can cause pain. Follow-up care is a key part of your treatment and safety. Be sure to make and go to all appointments, and call your doctor if you are having problems. It's also a good idea to know your test results and keep a list of the medicines you take. How can you care for yourself at home? · Rest and protect the injured or sore area. Stop, change, or take a break from any activity that may be causing your pain. · Put ice or a cold pack on the area for 10 to 20 minutes at a time. Put a thin cloth between the ice and your skin. · After 2 or 3 days, if your swelling is gone, apply a heating pad set on low or a warm cloth to your chest. Some doctors suggest that you go back and forth between hot and cold. Put a thin cloth between the heating pad and your skin. · Do not wrap or tape your ribs for support. This may cause you to take smaller breaths, which could increase your risk of pneumonia and lung collapse. · Ask your doctor if you can take an over-the-counter pain medicine, such as acetaminophen (Tylenol), ibuprofen (Advil, Motrin), or naproxen (Aleve). Be safe with medicines. Read and follow all instructions on the label. · Take your medicines exactly as prescribed. Call your doctor if you think you are having a problem with your medicine.   · Gentle stretching and massage may help you feel better after a few days of rest. Stretch slowly to the point just before discomfort begins, then hold the stretch for at least 15 to 30 seconds. Do this 3 or 4 times per day. · As your pain gets better, slowly return to your normal activities. Be patient, because chest bruises can take weeks or months to heal. Any increased pain may be a sign that you need to rest a while longer. When should you call for help? Call 911 anytime you think you may need emergency care. For example, call if:    · You have severe trouble breathing.     · You cough up blood.    Call your doctor now or seek immediate medical care if:    · You have belly pain.     · You are dizzy or lightheaded, or you feel like you may faint.     · You develop new symptoms with the chest pain.     · Your chest pain gets worse.     · You have a fever.     · You have some shortness of breath.     · You have a cough that brings up mucus from the lungs.    Watch closely for changes in your health, and be sure to contact your doctor if:    · Your chest pain is not improving after 1 week. Where can you learn more? Go to http://felton-sharon.info/. Enter I174 in the search box to learn more about \"Chest Contusion: Care Instructions. \"  Current as of: September 23, 2018  Content Version: 11.9  © 3048-3139 [employer]. Care instructions adapted under license by [employer] (which disclaims liability or warranty for this information). If you have questions about a medical condition or this instruction, always ask your healthcare professional. [place] [address] any warranty or liability for your use of this information.

## 2019-07-15 DIAGNOSIS — F41.0 PANIC ANXIETY SYNDROME: ICD-10-CM

## 2019-07-15 RX ORDER — ALPRAZOLAM 1 MG/1
TABLET ORAL
Qty: 30 TAB | Refills: 0 | Status: SHIPPED | OUTPATIENT
Start: 2019-07-15 | End: 2019-07-30 | Stop reason: SDUPTHER

## 2019-07-15 NOTE — TELEPHONE ENCOUNTER
----- Message from Naseem Blanc sent at 7/15/2019 12:49 PM EDT -----  Regarding: Herminia BROOKS/Carol Fitch Bel Message/Vendor Calls    Caller's first and last name: Leyla Holman      Reason for call: Requesting a call back regarding his last message       Call back required yes/no and why: Yes       Best contact number(s): [phone]        Details to clarify the request:      Naseem Blanc

## 2019-07-15 NOTE — TELEPHONE ENCOUNTER
Writer returned patients call, two patient identifiers used for patient verification, name and . Patient advised that he will need to be seen for medication refill, he needs to bring his bottle into the office as well. Patient stated that he didn't think this was quite fair, he has been out for a week, writer expressed to patient to call office when he refills his last refill to make an appointment not to wait until the medication runs out. Patient stated that he has been out of town as well.

## 2019-07-15 NOTE — TELEPHONE ENCOUNTER
Patient called into the office requesting for the NP to send him a few refills to the pharmacy due to him not being able to make it to an appointment. Informed patient that it is a controlled medication and it requires an appointment. Informed the patient that the next available appointment is July 30th. Patient stated that he has been out for a week and cannot wait that long. Informed patient to please call within the last 2-3 weeks of medication so that the office can try their best to accommodate him. Patient is requesting a call back from the nurse. Call back number is [phone].

## 2019-07-15 NOTE — TELEPHONE ENCOUNTER
Writer called patient back, two patient identifiers used for patient verification, name and , patient stated that he is completely out of medication, he does have an appointment scheduled for 2019

## 2019-07-30 ENCOUNTER — OFFICE VISIT (OUTPATIENT)
Dept: FAMILY MEDICINE CLINIC | Age: 44
End: 2019-07-30

## 2019-07-30 VITALS
WEIGHT: 202 LBS | OXYGEN SATURATION: 98 % | DIASTOLIC BLOOD PRESSURE: 94 MMHG | HEIGHT: 69 IN | HEART RATE: 75 BPM | BODY MASS INDEX: 29.92 KG/M2 | SYSTOLIC BLOOD PRESSURE: 142 MMHG | RESPIRATION RATE: 18 BRPM | TEMPERATURE: 96.3 F

## 2019-07-30 DIAGNOSIS — G43.009 MIGRAINE WITHOUT AURA AND WITHOUT STATUS MIGRAINOSUS, NOT INTRACTABLE: Primary | ICD-10-CM

## 2019-07-30 DIAGNOSIS — F41.0 PANIC ANXIETY SYNDROME: ICD-10-CM

## 2019-07-30 DIAGNOSIS — Z79.899 LONG TERM PRESCRIPTION BENZODIAZEPINE USE: ICD-10-CM

## 2019-07-30 RX ORDER — ALPRAZOLAM 1 MG/1
TABLET ORAL
Qty: 30 TAB | Refills: 2 | Status: SHIPPED | OUTPATIENT
Start: 2019-07-30 | End: 2019-11-12 | Stop reason: SDUPTHER

## 2019-07-30 RX ORDER — KETOROLAC TROMETHAMINE 30 MG/ML
60 INJECTION, SOLUTION INTRAMUSCULAR; INTRAVENOUS ONCE
Qty: 1 VIAL | Refills: 0
Start: 2019-07-30 | End: 2019-07-30

## 2019-07-30 NOTE — PROGRESS NOTES
Wes Tsang  Identified pt with two pt identifiers(name and ). Chief Complaint   Patient presents with    Medication Refill     rm 11/fasting/headaches       1. Have you been to the ER, urgent care clinic since your last visit? No Hospitalized since your last visit? no    2. Have you seen or consulted any other health care providers outside of the [address] [place] [address] since your last visit? Include any pap smears or colon screening. no      Advance Care Planning    In the event something were to happen to you and you were unable to speak on your behalf, do you have an Advance Directive/ Living Will in place stating your wishes? NO    If yes, do we have a copy on file NO    If no, would you like information NO    Medication reconciliation up to date and corrected with patient at this time. Today's provider has been notified of reason for visit, vitals and flowsheets obtained on patients. Reviewed record in preparation for visit, huddled with provider and have obtained necessary documentation. There are no preventive care reminders to display for this patient. Wt Readings from Last 3 Encounters:   19 203 lb (92.1 kg)   18 198 lb 3.2 oz (89.9 kg)   18 195 lb (88.5 kg)     Temp Readings from Last 3 Encounters:   19 97.5 °F (36.4 °C)   18 97 °F (36.1 °C) (Oral)   18 98.2 °F (36.8 °C) (Oral)     BP Readings from Last 3 Encounters:   19 150/90   18 (!) 151/106   18 (!) 134/92     Pulse Readings from Last 3 Encounters:   19 86   18 (!) 106   18 96     There were no vitals filed for this visit.       Learning Assessment:  :     Learning Assessment 2018   PRIMARY LEARNER Patient Patient   HIGHEST LEVEL OF EDUCATION - PRIMARY LEARNER  - GRADUATED HIGH SCHOOL OR GED   BARRIERS PRIMARY LEARNER - NONE   CO-LEARNER CAREGIVER - No   PRIMARY LANGUAGE ENGLISH ENGLISH   LEARNER PREFERENCE PRIMARY DEMONSTRATION VIDEOS ANSWERED BY Patient Patient   RELATIONSHIP SELF SELF       Depression Screening:  :     3 most recent PHQ Screens 12/18/2018   Little interest or pleasure in doing things Not at all   Feeling down, depressed, irritable, or hopeless Not at all   Total Score PHQ 2 0   Trouble falling or staying asleep, or sleeping too much -   Feeling tired or having little energy -   Poor appetite, weight loss, or overeating -   Feeling bad about yourself - or that you are a failure or have let yourself or your family down -   Trouble concentrating on things such as school, work, reading, or watching TV -   Moving or speaking so slowly that other people could have noticed; or the opposite being so fidgety that others notice -   Thoughts of being better off dead, or hurting yourself in some way -   PHQ 9 Score -       No flowsheet data found. Fall Risk Assessment:  :     No flowsheet data found. Abuse Screening:  :     Abuse Screening Questionnaire 12/18/2018   Do you ever feel afraid of your partner? N   Are you in a relationship with someone who physically or mentally threatens you? N   Is it safe for you to go home?  Y       ADL Screening:  :     ADL Assessment 7/23/2018   Feeding yourself No Help Needed   Getting from bed to chair No Help Needed   Getting dressed No Help Needed   Bathing or showering No Help Needed   Walk across the room (includes cane/walker) No Help Needed   Using the telphone No Help Needed   Taking your medications No Help Needed   Preparing meals No Help Needed   Managing money (expenses/bills) No Help Needed   Moderately strenuous housework (laundry) No Help Needed   Shopping for personal items (toiletries/medicines) No Help Needed   Shopping for groceries No Help Needed   Driving No Help Needed   Climbing a flight of stairs No Help Needed   Getting to places beyond walking distances No Help Needed           BMI:  Weight Metrics 4/16/2019 12/18/2018 7/23/2018 5/21/2018 1/15/2018 10/31/2017 10/17/2017 Weight 203 lb 198 lb 3.2 oz 195 lb 194 lb 4.8 oz 193 lb 199 lb 1.6 oz 193 lb   BMI 29.98 kg/m2 29.27 kg/m2 28.8 kg/m2 28.69 kg/m2 28.5 kg/m2 29.4 kg/m2 28.5 kg/m2         Patient presents for Controlled Substance Prescription follow up. Last prescription:    ALPRAZolam Clarance Kopperl) 1 mg tablet [[id number]]     Order Details   Dose, Route, Frequency: As Directed Note to Pharmacy: This request is for a new prescription for a controlled substance as required by Federal/State law. Dispense Quantity: 30 Tab Refills: 0 Fills remaining: --           Sig: TAKE 1 TABLET BY MOUTH EVERY DAY AS NEEDED FOR ANXIETY            Bottle matches last prescription. 16 pills remaining in bottle. Patient reports last dose taken at 10:00 am on 07/30/2019   printed. Urine collected. Controlled Substance Agreement letter printed. Medication reconciliation up to date and corrected with patient at this time.

## 2019-07-30 NOTE — LETTER
Baldemar Carlson :1975 MR #:[id number] Provider Daysi Hahn NP  
*[id number]* BSMG-491 () Page 1 of 5 Initial [facility] CONTROLLED SUBSTANCE AGREEMENT I may be prescribed medications that are controlled substances as part  of my treatment plan for management of my medical condition(s). The goal of my treatment plan is to maintain and/or improve my health and wellbeing. Because controlled substances have an increased risk of abuse or harm, continual re-evaluation is needed determine if the goals of my treatment plan are being met for my safety and the safety of others. Casandra Calhoun  am entering into this Controlled Substance Agreement with my provider, Jorge Padilla NP at [facility] . I understand that successful treatment requires mutual trust and honesty between me and my provider. I understand that there are state and federal laws and regulations which apply to the medications that my provider may prescribe that must be followed. I understand there are risks and benefits ts of taking the medicines that my provider may prescribe. I understand and agree that following this Agreement is necessary in continuing my provider-patient relationship and success of my treatment plan. As a part of my treatment plan, I agree to the following: COMMUNICATION: 
 
1. I will communicate fully with my provider about my medical condition(s), including the effect on my daily life and how well my medications are helping. I will tell my provider all of the medications that I take for any reason, including medications I receive from another health care provider, and will notify my provider about all issues, problems or concerns, including any side effects, which may be related to my medications. I understand that this information allows my provider to adjust my treatment plan to help manage my medical condition.  I understand that this information will become part of my permanent medical record. 2. I will notify my provider if I have a history of alcohol/drug misuse/addiction or if I have had treatment for alcohol/drug addiction in the past, or if I have a new problem with or concern about alcohol/drug use/addiction, because this increases the likelihood of high risk behaviors and may lead to serious medical conditions. 3. Females Only: I will notify my provider if I am or become pregnant, or if I intend to become pregnant, or if I intend to breastfeed. I understand that communication of these issues with my provider is important, due to possible effects my medication could have on an unborn fetus or breastfeeding child. Name:. Justine Castro :1975 MR #:[id number] Provider Bonifacio Asencio NP  
*[id number]* BSMG-491 () Page 2 of 5 Initial [employer] MISUSE OF MEDICATIONS / DRUGS: 
 
1. I agree to take all controlled substances as prescribed, and will not misuse or abuse any controlled substances prescribed by my provider. For my safety, I will not increase the amount of medicine I take without first talking with and getting permission from my provider. 2. If I have a medical emergency, another health care provider may prescribe me medication. If I seek emergency treatment, I will notify my provider within seventy-two (72) hours. 3. I understand that my provider may discuss my use and/or possible misuse/abuse of controlled substances and alcohol, as appropriate, with any health care provider involved in my care, pharmacist or legal authority. ILLEGAL DRUGS: 
 
1. I will not use illegal drugs of any kind, including but not limited to marijuana, heroin, cocaine, or any prescription drug which is not prescribed to me. DRUG DIVERSION / PRESCRIPTION FRAUD: 
 
1. I will not share, sell, trade, give away, or otherwise misuse my prescriptions or medications. 2. I will not alter any prescriptions provided to me by my provider. SINGLE PROVIDER: 
 
1. I agree that all controlled substances that I take will be prescribed only by my provider (or his/her covering provider) under this Agreement. This agreement does not prevent me from seeking emergency medical treatment or receiving pain management related to a surgery. PROTECTING MEDICATIONS: 
 
1. I am responsible for keeping my prescriptions and medications in a safe and secure place including safeguarding them from loss or theft. I understand that lost, stolen or damaged/destroyed prescriptions or medications will not be replaced. Name:. Alan Aranda :1975 MR #:[id number] Provider Tory Baron, TODD  
*[id number]* BSMG-491 () Page 3 of 5 Initial [employer] PRESCRIPTION RENEWALS/REFILLS: 
 
1. I will follow my controlled substance medication schedule as prescribed by my provider. 2. I understand and agree that I will make any requests for renewals or refills of my prescriptions only at the time of an office visit or during my providers regular office hours subject to the prescription refill requirements of the individual practice. 3. I understand that my provider may not call in prescriptions for controlled substances to my pharmacy. 4. I understand that my provider may adjust or discontinue these medications as deemed appropriate for my medical treatment plan. This Agreement does not guarantee the prescription of controlled medications. 5. I agree that if my medications are adjusted or discontinued, I will properly dispose of any remaining medications. I understand that I will be required to dispose of any remaining controlled medications prior to being provided with any prescriptions for other controlled medications.  
 
 
1. I authorize my provider and my pharmacy to cooperate fully with any local, state, or federal law enforcement agency in the investigation of any possible misuse, sale, or other diversion of my controlled substance prescriptions or medications. RISKS: 
 
 
1. I understand that if I do not adhere to this Agreement in any way, my provider may change my prescriptions, stop prescribing controlled substances or end our provider-patient relationship. 2. If my provider decides to stop prescribing medication, or decides to end our provider-patient relationship,my provider may require that I taper my medications slowly. If necessary, my provider may also provide a prescription for other medications to treat my withdrawal symptoms. UNDERSTANDING THIS AGREEMENT: 
 
I understand that my provider may adjust or stop my prescriptions for controlled substances based on my medical condition and my treatment plan. I understand that this Agreement does not guarantee that I will be prescribed medications or controlled substances. I understand that controlled substances may be just one part 
of my treatment plan. My initial on each page and my signature below shows that I have read each page of this Agreement, I have had an opportunity to ask questions, and all of my questions have been answered to my satisfaction by my provider. By signing below, I agree to comply with this Agreement, and I understand that if I do not follow the Agreements listed above, my provider may stop 
 
 
 
_________________________________________  Date/Time 7/30/2019 2:00 PM   
             (Patient Signature)

## 2019-07-30 NOTE — PROGRESS NOTES
S: Alberto John is a 37 y.o. male who presents for medication check and refill. Assessment/Plan:    1. HA - hx of migraine and tension  -HA for past 24hours, had HA last week that lasted 3 days  - ketorolac tromethamine (TORADOL) 60 mg/2 mL soln; 2 mL by IntraMUSCular route once for 1 dose. Dispense: 1 Vial; Refill: 0  - KETOROLAC TROMETHAMINE INJ  - GA THER/PROPH/DIAG INJECTION, SUBCUT/IM    2. Panic anxiety syndrome  -current therapy:  ALPRAZolam (XANAX) 1 mg tablet;prn #30 refill: 2 (pt has tried several alternate tx without success)  -CS signed today (last one 7/2018 w/Dr. Leyla Travis)  - COMPLIANCE DRUG SCREEN/PRESCRIPTION MONITORING    3. High Blood pressure  -BP = 142/94  -advised to monitor BP at home and keep log; goal<140/90 make OV if consistently above goal or <110/60    RTC 3 months for anxiety/med check       Requesting refills for: Xanax 1mg for anxiety  Current therapy: xanax 1mg daily - sometimes need 2 tabs a day, depending on what is happening in his day; sometimes tries to not take one tab if nothing to do   Tried many meds with Dr. ENGLISH [facility] (lexapro and inderal, zoloft, buspirone, paxil - but none of them worked for him; xanax is the only thing that has worked for him  Lexa Vargas for 6 months, kept him on xanax as only med that worked  Pt will go out of his way not to take a left turn - he will become anxious if he even thinks about it;   States anxiety went away for about 5 years, but started up again - lost many people close to him in [facility] and started his anxiety    HA  Had one last night and threw up  No photophobia  6/10 pain;   ketorlac 60mg im - pain reduced    HTN  BP at home 140's/90  Today: 144/92  Discussed starting meds  Will monitor BP at home and make OV if continues to be elevated   I would suggest propanolol since it also helps with anxiety and headaches.        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 3 most recent PHQ Screens 2019   Little interest or pleasure in doing things Not at all   Feeling down, depressed, irritable, or hopeless Not at all   Total Score PHQ 2 0   Trouble falling or staying asleep, or sleeping too much -   Feeling tired or having little energy -   Poor appetite, weight loss, or overeating -   Feeling bad about yourself - or that you are a failure or have let yourself or your family down -   Trouble concentrating on things such as school, work, reading, or watching TV -   Moving or speaking so slowly that other people could have noticed; or the opposite being so fidgety that others notice -   Thoughts of being better off dead, or hurting yourself in some way -   PHQ 9 Score -       I reviewed the following:  Current Outpatient Medications   Medication Sig Dispense Refill    ALPRAZolam (XANAX) 1 mg tablet TAKE 1 TABLET BY MOUTH EVERY DAY AS NEEDED FOR ANXIETY 30 Tab 0    naproxen (NAPROSYN) 500 mg tablet Take 1 Tab by mouth every twelve (12) hours as needed for Pain.  30 Tab 0    dexlansoprazole (DEXILANT) 60 mg CpDB capsule (delayed release) Take 1 Cap by mouth daily. 80 Cap 3       Past Medical History:   Diagnosis Date    Anxiety ~1997    Cancer [facility])     melanoma chest Dr Edgar Ingram fx [phone]    Esophageal hiatal hernia 2/4/14    GERD (gastroesophageal reflux disease)     Endoscopies normal.     Hematospermia 3/09    Hematuria     Hip joint pain 10/18/10    dr Esthela Oleary IBS (irritable bowel syndrome)     based on history    Melanoma of trunk ([address] 10/18/10     with Dr. Kylee Moreno fx [phone]    Panic disorder     Pulmonary nodule, right 5/09    4mm nodule right lung- incidental finding on CT scan Uro did for hematuria       No Known Allergies    O: VS:   Visit Vitals  BP (!) 142/94 (BP 1 Location: Right arm, BP Patient Position: Sitting)   Pulse 75   Temp 96.3 °F (35.7 °C) (Oral)   Resp 18   Ht 5' 9\" (1.753 m)   Wt 202 lb (91.6 kg)   SpO2 98%   BMI 29.83 kg/m²      GENERAL: Robert Younger is in no acute distress. Non-toxic. Well nourished. Well developed. Appropriately groomed. HEAD:  Normocephalic. Atraumatic. EYE: PERRLA. RESP: Breath sounds are symmetrical bilaterally. Unlabored without SOB. Speaking in full sentences. Clear to auscultation bilaterally anteriorly and posteriorly. No wheezes. No rales or rhonchi. CV: normal rate. Regular rhythm. S1, S2 audible. No murmur noted. No rubs, clicks or gallops noted. PSYCH: appropriate behavior, dress and thought processes. Good eye contact. Clear and coherent speech. Full affect. Good insight.   _______________________________________________________________  I spent >25 minutes face to face with patient with >50% of time spent in counseling and coordinating care. Patient education was done. Advised on nutrition, physical activity, weight management, tobacco, alcohol and safety.   Counseling included discussion of diagnosis, differentials, treatment options, prescribed treatment, warning signs and follow up. Medication risks/benefits,interactions and alternatives discussed with patient.      Patient verbalized understanding and agreed to plan of care. Patient was given an after visit summary which included current diagnoses, medications and vital signs. Follow up as directed.

## 2019-07-30 NOTE — PATIENT INSTRUCTIONS
1) Headache may be due to elevated blood pressure. Medication might be necessary to lower this. Since you have anxiety and blood pressure is elevated in clinic, monitor BP at home. Keep a blood pressure log and if BP is consistently elevated at home, return to clinic so we can discuss starting medication. Please check your blood pressure through the week at staggered times in the day. (If you check in the morning, it should be at least one hour after your morning blood pressure medications.)      Arm monitors are most accurate. If you use a wrist monitor, make sure your wrist is at heart level. You can bring your home monitor to your next visit and have it calibrated with the machine in the office to gauge your readings. Sit  with your feet uncrossed and relax for 5 minutes before taking your BP. Keep a written record of your blood pressure readings and bring it to each appointment. If your systolic (top) blood pressure is consistently greater than 140mmHg or less than 353ODHF of the diastolic (bottom) number is consistently greater than 90mmHg or less than 60mmHg then please schedule an office appointment. Work on healthy eating - no salt diet - and incorporating daily exercise into your routine. Cardiac symptoms that would need immediate attention include: uncomfortable pressure, squeezing, fullness or pain in the center of your chest. Pain or discomfort in one or both arms, the back, neck, jaw or stomach. Shortness of breath with or without chest discomfort, breaking out in a cold sweat, nausea or lightheadedness. 2) Ketoralac 60mg injection for headache pain today. Increase water intake to at least 64oz daily. Headaches - Headaches can be quite debilitating, although most headaches are not caused by life-threatening disorders.  Most headaches are caused by one of four syndromes: Tension-type headache, Migraine headache, Chronic daily headache, Cluster headache      TENSION TYPE HEADACHE:   Symptoms of tension type headaches  Include: pressure or tightness around both sides of the head or neck; mild to moderate pain that is steady and does not throb, pain is not worsened by activity, pain can increase or decrease in severity over the course of the headache, there may be tenderness in the muscles of the head, neck, or shoulders  People with TTH often feel stress or tension before their headache. Unlike migraine, tension headaches occur without other symptoms such as nausea, vomiting, sensitivity to lights and sounds, or an aura. However, some people have symptoms of both tension and migraine headache. MIGRAINE HEADACHES -- Migraine headaches are a type of headache that causes moderate to severe pain that is worsened by light, noise, and motion. Some people also experience nausea and vomiting. Migraine headaches typically last for a few hours, but may last for as long as three days. CLUSTER HEADACHE --  are severe, debilitating headaches that occur repeatedly for weeks to months at a time, followed by periods with no headache. Cluster headaches are relatively uncommon, affecting less than one percent of people. Men are affected more commonly than women, with a peak age of onset of 22 to 48 years.   Symptoms can include: begin quickly without any warning and reach a peak within a few minutes; headache is usually deep, excruciating, continuous, and explosive in quality, although occasionally it may be pulsatile and throbbing; the attack may occur up to eight times per day but is usually short in duration (between 15 minutes and three hours); pain typically begins in or around the eye or temple; less commonly it starts in the face, neck, ear, or side of the head; pain is always on one side; most people with cluster headache are restless and may pace or rock back and forth when an attack is in progress; associated with eye redness and tear production on the side of the pain, a stuffy and runny nose, sweating, and pale skin; can be light sensitive in the eye on the affected side. Cluster headaches can begin at any age. People with cluster headaches are more likely to have family members who also have cluster headaches. Drinking alcohol can bring on a cluster headache. CHRONIC DAILY HEADACHE -- Some people develop very frequent headaches, as frequent as every day in some cases. When a headache is present for more than 15 days per month for at least three months, it is described as a chronic daily headache. Chronic daily headache is not a type of headache but a category that includes frequent headaches of various kinds. Most people with chronic daily headache have migraine or tension-type headache as the underlying type of headache. They often start out having an occasional migraine or tension-type headache, but the headaches became more frequent over months or years. Some people with frequent headache use headache medications too often, which can lead to \"medication-overuse headaches\"     Medication-overuse headache -- Medication-overuse headache [facility]) may occur in people who have frequent migraine, cluster, or tension-type headaches, which leads them to overuse pain medications. A vicious cycle occurs whereby frequent headaches cause the person to take medication frequently (often non-prescription medication), which then causes a rebound headache as the medication wears off, causing more medication use, and so on. Overuse of any number of pain medications can increase the risk of developing medication-overuse headaches. To avoid medication-overuse headaches, we recommend the following:  ? If possible, avoid butalbital combinations (Fiorinal®, Fioricet®, Esgic®) and narcotics completely. ?Do not use triptans (Imitrex® and others) or aspirin/acetaminophen/caffeine combinations (Excedrin®) more than nine days per month.   ?Do not use NSAIDS (eg, ibuprofen, Advil, Motrin, aspirin) more than 15 days per month. ?Do not take acetaminophen (eg, Tylenol®) more than two times per week. If you have frequent headaches, you may need a preventive medication. Lifestyle modifications that should be started to prevent headaches include eating a healthy diet, daily exercise, getting a minimum of 7-8 hours sleep at night, and stop smoking. Additionally, keep a journal of when headaches occur, food eaten, stress, etc to better identify triggers. If headache becomes \"the worst headache of your life\", you have changes in vision, experience weakness, numbness or tingling, or spike a fever, seek immediate medical attention, call 911.    3) . As we discussed, xanax is a controlled substance and requires a visit every 3 months. You also will need to sign a new controlled substance agreement. Xanax is addictive, so finding an alternative therapy is necessary. I know you have tried multiple medications in the past without success. Please make an appointment with psychiatry to find a better alternative. Controlled Substance agreement completed today.    -Controlled substance prescribing this medication now requires initial [place] drug screening, and 1 per calender year at minimum. Drug screenings may be unannounced.   -You must follow up in office every 3 months at minimum  -A check of the Prescription Monitoring Program - (prescribing and dispensing data submitted by pharmacies and prescribers) - will be performed every time  -You will only have this medication prescribed by this clinic, by one provider or partner in primary provider's absence   -You will only use 1 pharmacy for filling this medication    Any aberrant behavior or deviation from above guidelines will result in this medication not being prescribed by any provider in this clinic and will be reported. Make an appt in 3 months to come back for medication check. 4) Anxiety is a common problem. It affects all kinds of people.  There is no reason to feel embarrassed about getting treatment for anxiety. Whether you have occasional anxiety or a diagnosable disorder, there are steps you can take to help minimize and manage feeling of anxiety. Everyone feels anxious or nervous once in a while. That is normal. But being extremely anxious or worried on most days for 6 months or longer is not normal, and is considered a medical problem. This is called \"generalized anxiety disorder. \" The disorder can make it hard to do everyday tasks. Generalized anxiety disorder is just one anxiety disorder. There are others, such as panic disorder and phobias. Symptoms of extreme or severe anxiety:  People with extreme or severe anxiety feel very worried or \"on edge\" much of the time. They can have trouble sleeping or forget things. Plus, they can have physical symptoms. For instance, people with severe anxiety often feel very tired and have tense muscles. Some get stomach aches or feel chest \"tightness. \"    Steps you can do on your own to feel better:  Deep breathing exercises: Deep breathing triggers a relaxation response, helping to change from the \"fight-or-flight\" response anxiety brings on. Inhale slowly to a count of 4, starting at your belly and then moving into your chest.  Gently hold your breath for 4 counts, then slowly exhale to 4 counts. \"Clench\" exercise - clench various zones in your body for 30 seconds, then an overall body clench  Exercise can help many people feel less anxious. Regular cardiovascular exercise (such as fast walking,) release endorphins - the \"feel good\" hormone in our body - and can reduce anxiety. Proper sleep:  Sleep is important to overall health. Not getting enough sleep can cause fatigue, inattention, and irritabililty, causing anxiety levels to increase. Healthy Diet: a healthy diet rich in whole grains, vegetables and fruits is healthier than simple carbohydrates found in processed foods.  Skipping meals can cause blood sugar to drop and cause jittery feelings that could worsening underlying anxiety. It also a good idea to cut down on or stop drinking coffee and other sources of caffeine. Caffeine can make anxiety worse. Medical treatments include:  ? Psychotherapy - Psychotherapy involves meeting with a mental health counselor to talk about your feelings, relationships, and worries. Therapy can help you find new ways of thinking about your situation so that you feel less anxious. In therapy, you might also learn new skills to reduce anxiety. Please call your insurance company to get a list of therapists that will be covered and make an appointment. Alternatively, you can go to Psychology Today's website to find a counselor. · Go to www. [email or web address] [employer]oo  · Enter your zip code. · Click on your insurance carrier (usually on left side of screen). · Then click any other parameters you desire.      This will result in a list of providers. Click on any provider to learn more about them or see the contact information. Please choose a provider, call them, and schedule an appointment. ?Medicines - Medicines used to treat depression can relieve anxiety, too, even in people who are not depressed. Some people have psychotherapy and take medicines at the same time. Phone apps that can help with anxiety:  CALM - Use the principles of mindfulness and meditation to ease your mind and keep anxiety at [facility]94. The [employer] Rabbit interface is just the beginning. Once it opens, there are relaxing sounds and sleep stories. Enjoy guided meditations at various lengths to help with everything from building self-esteem to calming anxiety. SIMPLE HABIT    NELI - Self-help for Anxiety Management - range of self-help methods. Helps you understand what causes your anxiety, monitor your anxious thoughts and behavior over time and manage your anxiety through self-help exercises and private reflection.     BOOSTERBUDDY   This eva offers a novel way for teens and young adults to improve their resilience and work toward being healthier both physically and emotionally. 7 CUPS  7 Cups uses trained, volunteer, active listeners to provide free, anonymous, and confidential emotional support to people needing help coping with acute stressors and long-term mental health issues. [facility] PlayScape apps provide people with quick resources for tracking their cognitive distortions, activities, and safety plan in case of an emergency. SLEEPBOT   SleepBot is a quick and simple way for people to log their sleep and improve their sleep hygiene. [facility] EVA   The Whats Up? eva helps people monitor their mood and uses principles of CBT and acceptance commitment therapy (ACT) to help people reframe their thoughts and cognitive distortions. Keep the numbers for these national suicide hotlines: 0-940-269-TALK (9-520.465.9520) and 0-878-HJRJGQN (5-935.912.5770). If you or someone you know talks about suicide or feeling hopeless, get help right away. [address] [place] [address] St: [phone]  Crisis: [address] [place] [address] Avenue: [phone]  Crisis: [address]: [phone]  Crisis: [phone]         Anxiety Disorder: Care Instructions  Your Care Instructions    Anxiety is a normal reaction to stress. Difficult situations can cause you to have symptoms such as sweaty palms and a nervous feeling. In an anxiety disorder, the symptoms are far more severe. Constant worry, muscle tension, trouble sleeping, nausea and diarrhea, and other symptoms can make normal daily activities difficult or impossible. These symptoms may occur for no reason, and they can affect your work, school, or social life. Medicines, counseling, and self-care can all help.   Follow-up care is a key part of your treatment and safety. Be sure to make and go to all appointments, and call your doctor if you are having problems. It's also a good idea to know your test results and keep a list of the medicines you take. How can you care for yourself at home? · Take medicines exactly as directed. Call your doctor if you think you are having a problem with your medicine. · Go to your counseling sessions and follow-up appointments. · Recognize and accept your anxiety. Then, when you are in a situation that makes you anxious, say to yourself, \"This is not an emergency. I feel uncomfortable, but I am not in danger. I can keep going even if I feel anxious. \"  · Be kind to your body:  ? Relieve tension with exercise or a massage. ? Get enough rest.  ? Avoid alcohol, caffeine, nicotine, and illegal drugs. They can increase your anxiety level and cause sleep problems. ? Learn and do relaxation techniques. See below for more about these techniques. · Engage your mind. Get out and do something you enjoy. Go to a [employer] movie, or take a walk or hike. Plan your day. Having too much or too little to do can make you anxious. · Keep a record of your symptoms. Discuss your fears with a good friend or family member, or join a support group for people with similar problems. Talking to others sometimes relieves stress. · Get involved in social groups, or volunteer to help others. Being alone sometimes makes things seem worse than they are. · Get at least 30 minutes of exercise on most days of the week to relieve stress. Walking is a good choice. You also may want to do other activities, such as running, swimming, cycling, or playing tennis or team sports. Relaxation techniques  Do relaxation exercises 10 to 20 minutes a day. You can play soothing, relaxing music while you do them, if you wish. · Tell others in your house that you are going to do your relaxation exercises. Ask them not to disturb you.   · Find a comfortable place, away from all distractions and noise. · Lie down on your back, or sit with your back straight. · Focus on your breathing. Make it slow and steady. · Breathe in through your nose. Breathe out through either your nose or mouth. · Breathe deeply, filling up the area between your navel and your rib cage. Breathe so that your belly goes up and down. · Do not hold your breath. · Breathe like this for 5 to 10 minutes. Notice the feeling of calmness throughout your whole body. As you continue to breathe slowly and deeply, relax by doing the following for another 5 to 10 minutes:  · Tighten and relax each muscle group in your body. You can begin at your toes and work your way up to your head. · Imagine your muscle groups relaxing and becoming heavy. · Empty your mind of all thoughts. · Let yourself relax more and more deeply. · Become aware of the state of calmness that surrounds you. · When your relaxation time is over, you can bring yourself back to alertness by moving your fingers and toes and then your hands and feet and then stretching and moving your entire body. Sometimes people fall asleep during relaxation, but they usually wake up shortly afterward. · Always give yourself time to return to full alertness before you drive a car or do anything that might cause an accident if you are not fully alert. Never play a relaxation tape while you drive a car. When should you call for help? Call 911 anytime you think you may need emergency care. For example, call if:    · You feel you cannot stop from hurting yourself or someone else.   [place] the numbers for these national suicide hotlines: 3-692-493-TALK (2-877.173.2311) and 6-843-JXCRLQK (2-577.295.5605).  If you or someone you know talks about suicide or feeling hopeless, get help right away.   Watch closely for changes in your health, and be sure to contact your doctor if:    · You have anxiety or fear that affects your life.     · You have symptoms of anxiety that are new or different from those you had before. Where can you learn more? Go to http://felton-sharon.info/. Enter P754 in the search box to learn more about \"Anxiety Disorder: Care Instructions. \"  Current as of: September 11, 2018  Content Version: 12.1  © 0329-1345 Healthwise, [employer]. Care instructions adapted under license by [employer] (which disclaims liability or warranty for this information). If you have questions about a medical condition or this instruction, always ask your healthcare professional. [address] any warranty or liability for your use of this information.

## 2019-08-07 LAB — DRUGS UR: NORMAL

## 2019-11-12 ENCOUNTER — OFFICE VISIT (OUTPATIENT)
Dept: FAMILY MEDICINE CLINIC | Age: 44
End: 2019-11-12

## 2019-11-12 VITALS
TEMPERATURE: 97.1 F | HEART RATE: 80 BPM | RESPIRATION RATE: 18 BRPM | OXYGEN SATURATION: 100 % | WEIGHT: 210.1 LBS | SYSTOLIC BLOOD PRESSURE: 137 MMHG | HEIGHT: 69 IN | BODY MASS INDEX: 31.12 KG/M2 | DIASTOLIC BLOOD PRESSURE: 102 MMHG

## 2019-11-12 DIAGNOSIS — F41.0 PANIC ANXIETY SYNDROME: ICD-10-CM

## 2019-11-12 RX ORDER — LISINOPRIL 5 MG/1
5 TABLET ORAL DAILY
Qty: 30 TAB | Refills: 1 | Status: SHIPPED | OUTPATIENT
Start: 2019-11-12 | End: 2019-11-12 | Stop reason: ALTCHOICE

## 2019-11-12 RX ORDER — PROPRANOLOL HYDROCHLORIDE 20 MG/1
20 TABLET ORAL 3 TIMES DAILY
Qty: 30 TAB | Refills: 1 | Status: SHIPPED | OUTPATIENT
Start: 2019-11-12 | End: 2020-02-18 | Stop reason: SDUPTHER

## 2019-11-12 RX ORDER — ALPRAZOLAM 1 MG/1
TABLET ORAL
Qty: 30 TAB | Refills: 2 | Status: SHIPPED | OUTPATIENT
Start: 2019-11-12 | End: 2020-02-18 | Stop reason: SDUPTHER

## 2019-11-12 NOTE — PATIENT INSTRUCTIONS
1) Start propranolol 20mg mg to help with blood pressure, migraine HA and anxiety. This is usually dosed 2-3x day, but will start with 1 morning dose and see how BP runs at work since your home readings are in the normal range in the evenings. Send me a Goodybagt message with your blood pressure readings in 2 weeks    Take your blood pressure medication in the morning. Please check your blood pressure 1-2 times a week at work    Arm monitors are most accurate. If you use a wrist monitor, make sure your wrist is at heart level. You can bring your home monitor to your next visit and have it calibrated with the machine in the office to gauge your readings. Sit  with your feet uncrossed and relax for 5 minutes before taking your BP. Keep a written record of your blood pressure readings and bring it to each appointment. If your systolic (top) blood pressure is consistently greater than 130mmHg or less than [id number] of the diastolic (bottom) number is consistently greater than 80mmHg or less than 60mmHg then please schedule an office appointment. Work on healthy eating - no salt diet, more potassium (helps flush out sodium,making healthier heart and arteries) - and incorporating daily exercise into your routine. Cardiac symptoms that would need immediate attention include: uncomfortable pressure, squeezing, fullness or pain in the center of your chest. Pain or discomfort in one or both arms, the back, neck, jaw or stomach. Shortness of breath with or without chest discomfort, breaking out in a cold sweat, nausea or lightheadedness. 2) Stay away from processed foods, foods with salt or MSG or other headache triggers. Healthy Weight  Body Mass Index is a noninvasive way to screen for weight and body fat. This is a mathematical calculation based on your height and weight. A healthy BMI is between 20% -24.9%. Your BMI is 231 %.   There is a relationship between high BMI and various healthy problems, such as osteoarthritis, muscle pain, increased risk of cancer, diabetes, heart disease, stroke, hypertension, high cholesterol, sleep apnea, breathing problems, depression, which is why a healthy BMI is so important. In terms of weight loss, a 5-10% reduction in body weight over 3-6 months is a reasonable goal.  There would likely be improvements in risk for disease such as diabetes and heart disease, with this amount of weight loss. In order to lose weight, try reducing your daily intake of calories by decreasing portion size of food and increase exercise to help reduce weight. Eat 3-5 small meals per day instead of 3 large meals. A good tip to losing weight : DON'T EAT OR DRINK ANYTHING AFTER DINNER! Choose lean meats for protein source which include chicken, pork, and turkey. The recommended serving size for protein is a 2-3 oz serving (the size of your fist), and 1-1.5 oz of carbohydrate per meal (about 1 cup). Increase servings of fruits and vegetables. Limit processed carbohydrates, (i.e. most breads, crackers, pasta, chips, rice, breaded or battered food, etc). If you choose to eat carbohydrates, whole wheat, (instead of white) is a healthier option for bread, rice, and crackers. Avoid fried foods. Limit sugar. Do your best to avoid all sweetened beverages, such as alcohol, juice, sodas or sweet teas, drink water, unsweet tea, diet soda, or crystal light as options instead. (Don't drink more than 2 of the 12oz artificially sweetened drinks per day as these can increase hunger and make it more difficult to lose weight. The daily goal for water intake should be at least 64 oz/day for most people. Daily exercise will also help with weight loss and overall health. A minimum of 150 minutes a week of moderate exercise is recommended (30 minutes per day).   Make exercise a routine part of your day - for example, park in spaces far away from [facility], take stairs instead of elevator, if sitting for long periods, get up from chair and walk every hour. Recruit a friend or relative to exercise with you and keep you on schedule. It is much easier to exercise with a abundio who will make sure you work out each day! Home DVDs can be a great way to work out, if you will do them (otherwise, they aren't worth the money!) Data3Sixty is a eight week mixed martial arts (MMA) based workout. It has 5 main workout DVDs, each one is 45 minutes consisting of a 10 minute warm-up, five 5 minute workouts and a 6 minute stretching/cool down. It is easy to follow, with options to modify each move and you only need hand weights and some space to do the work out. Meal planning smart phone applications like Recoup can help plan healthy meals. Get 7-8 hours of sleep each night. For reliable dietary information, go to www. EATRIGHT.org.    You may wish to consider seeing the nutritionist at [facility] ([phone]), [facility] ([phone]) or [facility] ([phone]). Alternatively, you can dial [employer] at [phone] (Monday - Friday 9am - 5pm) for a complimentary (free) conversation about your diet. Meal plans, grocery list and tips for healthy eating can be sent to you upon request.     3) Refill on xanax 1mg #30 with 2 refills sent electronically to [facility] on [address] is a common problem. It affects all kinds of people. There is no reason to feel embarrassed about getting treatment for anxiety. Whether you have occasional anxiety or a diagnosable disorder, there are steps you can take to help minimize and manage feeling of anxiety. Everyone feels anxious or nervous once in a while. That is normal. But being extremely anxious or worried on most days for 6 months or longer is not normal, and is considered a medical problem. This is called \"generalized anxiety disorder. \" The disorder can make it hard to do everyday tasks.   Generalized anxiety disorder is just one anxiety disorder. There are others, such as panic disorder and phobias. Symptoms of extreme or severe anxiety:  People with extreme or severe anxiety feel very worried or \"on edge\" much of the time. They can have trouble sleeping or forget things. Plus, they can have physical symptoms. For instance, people with severe anxiety often feel very tired and have tense muscles. Some get stomach aches or feel chest \"tightness. \"    Steps you can do on your own to feel better:  Deep breathing exercises: Deep breathing triggers a relaxation response, helping to change from the \"fight-or-flight\" response anxiety brings on. Inhale slowly to a count of 4, starting at your belly and then moving into your chest.  Gently hold your breath for 4 counts, then slowly exhale to 4 counts. \"Clench\" exercise - clench various zones in your body for 30 seconds, then an overall body clench  Exercise can help many people feel less anxious. Regular cardiovascular exercise (such as fast walking,) release endorphins - the \"feel good\" hormone in our body - and can reduce anxiety. Proper sleep:  Sleep is important to overall health. Not getting enough sleep can cause fatigue, inattention, and irritabililty, causing anxiety levels to increase. Healthy Diet: a healthy diet rich in whole grains, vegetables and fruits is healthier than simple carbohydrates found in processed foods. Skipping meals can cause blood sugar to drop and cause jittery feelings that could worsening underlying anxiety. It also a good idea to cut down on or stop drinking coffee and other sources of caffeine. Caffeine can make anxiety worse. Find \"Me\" time - it is important to take some time just to focus on you and help alleviate stress in daily life. This could be daily exercise, walking the dog, sitting in a quiet place without distraction, meditation, etc.     Medical treatments include:  ? Psychotherapy - Psychotherapy involves meeting with a mental health counselor to talk about your feelings, relationships, and worries. Therapy can help you find new ways of thinking about your situation so that you feel less anxious. In therapy, you might also learn new skills to reduce anxiety. Please call your insurance company to get a list of therapists that will be covered and make an appointment. Alternatively, you can go to Psychology Today's website to find a counselor. · Go to [email or web address]  · Enter your zip code. · Click on your insurance carrier (usually on left side of screen). · Then click any other parameters you desire. This will result in a list of providers. Click on any provider to learn more about them or see the contact information. Please choose a provider, call them, and schedule an appointment. ?Medicines - Medicines used to treat depression can relieve anxiety, too, even in people who are not depressed. Some people have psychotherapy and take medicines at the same time. Phone apps that can help with anxiety:  CALM - Use the principles of mindfulness and meditation to ease your mind and keep anxiety at [address]. The BitStash interface is just the beginning. Once it opens, there are relaxing sounds and sleep stories. Enjoy guided meditations at various lengths to help with everything from building self-esteem to calming anxiety. SIMPLE HABIT - guided meditation for anxiety relief    NELI - Self-help for Anxiety Management - range of self-help methods. Helps you understand what causes your anxiety, monitor your anxious thoughts and behavior over time and manage your anxiety through self-help exercises and private reflection. BOOSTERBUDDY   This eva offers a novel way for teens and young adults to improve their resilience and work toward being healthier both physically and emotionally.       7 CUPS  7 Cups uses trained, volunteer, active listeners to provide free, anonymous, and confidential emotional support to people needing help coping with acute stressors and long-term mental health issues. [facility] 82 apps provide people with quick resources for tracking their cognitive distortions, activities, and safety plan in case of an emergency. SLEEPBOT   SleepBot is a quick and simple way for people to log their sleep and improve their sleep hygiene. [employer] EVA   The Whats Up? eva helps people monitor their mood and uses principles of CBT and acceptance commitment therapy (ACT) to help people reframe their thoughts and cognitive distortions. Keep the numbers for these national suicide hotlines: 9-128-382-TALK (6-633.509.6580) and 7-730-GYKPCIB (5-378.918.8190). If you or someone you know talks about suicide or feeling hopeless, get help right away. [address] [place] [address] St: [phone]  Crisis: [address] [place] [address] Avenue: [phone]  Crisis: [address]: [phone]  Crisis: [phone]         Learning About a Vegetarian Diet  What is it? In general, a vegetarian diet is a diet that doesn't include meat. But there are several kinds of vegetarian diets. Lacto-ovo vegetarian. This type of diet includes milk products (such as milk, cheese, and yogurt) and eggs. But it doesn't include meat, poultry, seafood, or fish. \"Lacto\" means milk. \"Ovo\" means eggs. Lacto-vegetarian. It includes milk products but no eggs, meat, poultry, seafood, or fish. Vegan. It's a diet of only plant foods. This means no milk products, eggs, honey, or gelatin. (Gelatin comes from bones and other animal tissue.)  Many people follow a semi-vegetarian diet. Most of their diet is vegetarian. But sometimes they may eat meat, poultry, seafood, fish, and/or eggs. Why might you eat a vegetarian diet?   People may choose a vegetarian or vegan diet for various reasons. For example:  · It can be healthier than other diets. · Some people think it's wrong to use animals for food. · Some religions forbid eating meat. · It may cost less than a diet that includes meat. · Eating less meat can be better for the environment. · Some people don't like the taste of meat. How do you eat a healthy vegetarian diet? A vegetarian diet can meet all your nutritional needs. A healthy vegetarian diet includes lots of fruits, vegetables, legumes, nuts, and whole grains. It limits added sugar, salt, and unhealthy fats. As long as you eat a variety of foods, there are only a few things you need to pay special attention to. Calcium and vitamin D, for vegetarians who don't eat milk products. Getting enough calcium and vitamin D is important to keep bones strong. Foods that have calcium include certain legumes, certain leafy green vegetables, nuts, seeds, and tofu. Some breakfast cereals, soy milk, and orange juice are fortified with calcium and vitamin D. Iron. Getting enough iron isn't a problem if you eat a wide variety of food. Vegan iron sources include cooked dried beans, peas, and lentils; leafy green vegetables; and iron-fortified grain products. Eating foods rich in vitamin C will help your body absorb iron. Vitamin B12, for vegans. Vitamin B12 comes from animal sources only. If you eat a vegan diet, you'll need to eat foods that are fortified with this vitamin (such as soy milk and breakfast cereals). Or you can take supplements. You can also take supplements to get all the vitamins and minerals listed above. How can you eat a healthy vegetarian diet when you're pregnant? Make sure that you get enough protein, vitamin B12, calcium, vitamin D, zinc, and iron while you are pregnant and breastfeeding. These are vital to your baby's health. You might want to see a registered dietitian to be sure that you're eating a balanced diet.  This can be even more important if you plan to eat a strict vegetarian diet. You may need to take a vitamin and mineral supplement. How can you get more protein on a vegetarian diet? Protein is made of building blocks called amino acids. The human body can make some of these amino acids. But you must get the nine essential amino acids from food. Protein isn't just found in meat. Other sources include cheese, milk, and other milk products. Instead of eating 1 ounce of meat, you can eat:  · ¼ cup cooked beans, peas, or lentils. · ¼ cup tofu (about 2 ounces). · 8 oz milk. · 1 oz cheese. · 8 oz regular yogurt or 4 oz Greek yogurt. · 2 Tbsp hummus. · ½ oz nuts or seeds (for example, 12 almonds or 7 walnut halves). · 1 Tbsp peanut butter or other nut or seed butter. You can get more protein in your food by adding high-protein ingredients. For example, you can:  · Add powdered milk to other foods, such as pudding or soups. · Add powdered protein to fruit smoothies and cooked cereal.  · Add beans to soup and chili. · Add nuts, seeds, or wheat germ to yogurt. You can also:  · Spread peanut butter on a banana. · Mix cottage cheese into noodle dishes or casseroles. · Sprinkle hard-boiled eggs on a salad. · Grate cheese over vegetables and soups. You can also buy protein bars, drinks, and powders. Check the nutrition label for the amount of protein in each serving. Follow-up care is a key part of your treatment and safety. Be sure to make and go to all appointments, and call your doctor if you are having problems. It's also a good idea to know your test results and keep a list of the medicines you take. Where can you learn more? Go to http://felton-sharon.info/. Enter [id number] in the search box to learn more about \"Learning About a Vegetarian Diet. \"  Current as of: November 7, 2018  Content Version: 12.2  © 5200-7160 [employer], [employer].  Care instructions adapted under license by [employer] (which disclaims liability or warranty for this information). If you have questions about a medical condition or this instruction, always ask your healthcare professional. [place] [address] any warranty or liability for your use of this information.

## 2019-11-12 NOTE — PROGRESS NOTES
Beverly Henry  Identified pt with two pt identifiers(name and ). Chief Complaint   Patient presents with    Medication Refill    Elbow Pain    Headache       1. Have you been to the ER, urgent care clinic since your last visit? Hospitalized since your last visit? No    2. Have you seen or consulted any other health care providers outside of the [address] [place] [address] since your last visit? Include any pap smears or colon screening. No      Would you like to sign up for MyChart today, if you have not already done so? Patient has a mychart  If not, would you like information on MyChart, and how to sign up at a later time? No      Medication reconciliation up to date and corrected with patient at this time. Today's provider has been notified of reason for visit, vitals and flowsheets obtained on patients. Reviewed record in preparation for visit, huddled with provider and have obtained necessary documentation. There are no preventive care reminders to display for this patient.     Wt Readings from Last 3 Encounters:   19 210 lb 1.6 oz (95.3 kg)   19 202 lb (91.6 kg)   19 203 lb (92.1 kg)     Temp Readings from Last 3 Encounters:   19 97.1 °F (36.2 °C) (Oral)   19 96.3 °F (35.7 °C) (Oral)   19 97.5 °F (36.4 °C)     BP Readings from Last 3 Encounters:   19 (!) 137/102   19 (!) 142/94   19 150/90     Pulse Readings from Last 3 Encounters:   19 80   19 75   19 86     Vitals:    19 1530   BP: (!) 137/102   Pulse: 80   Resp: 18   Temp: 97.1 °F (36.2 °C)   TempSrc: Oral   SpO2: 100%   Weight: 210 lb 1.6 oz (95.3 kg)   Height: 5' 9\" (1.753 m)   PainSc:   0 - No pain         Learning Assessment:  :     Learning Assessment 2018   PRIMARY LEARNER Patient Patient   HIGHEST LEVEL OF EDUCATION - PRIMARY LEARNER  - GRADUATED HIGH SCHOOL OR GED   BARRIERS PRIMARY LEARNER - NONE   CO-LEARNER CAREGIVER - No   PRIMARY LANGUAGE ENGLISH ENGLISH   LEARNER PREFERENCE PRIMARY DEMONSTRATION VIDEOS   ANSWERED BY Patient Patient   RELATIONSHIP SELF SELF       Depression Screening:  :     3 most recent PHQ Screens 7/30/2019   Little interest or pleasure in doing things Not at all   Feeling down, depressed, irritable, or hopeless Not at all   Total Score PHQ 2 0   Trouble falling or staying asleep, or sleeping too much -   Feeling tired or having little energy -   Poor appetite, weight loss, or overeating -   Feeling bad about yourself - or that you are a failure or have let yourself or your family down -   Trouble concentrating on things such as school, work, reading, or watching TV -   Moving or speaking so slowly that other people could have noticed; or the opposite being so fidgety that others notice -   Thoughts of being better off dead, or hurting yourself in some way -   PHQ 9 Score -       Fall Risk Assessment:  :     No flowsheet data found. Abuse Screening:  :     Abuse Screening Questionnaire 7/30/2019 12/18/2018   Do you ever feel afraid of your partner? N N   Are you in a relationship with someone who physically or mentally threatens you? N N   Is it safe for you to go home?  Y Y       ADL Screening:  :     ADL Assessment 7/23/2018   Feeding yourself No Help Needed   Getting from bed to chair No Help Needed   Getting dressed No Help Needed   Bathing or showering No Help Needed   Walk across the room (includes cane/walker) No Help Needed   Using the telphone No Help Needed   Taking your medications No Help Needed   Preparing meals No Help Needed   Managing money (expenses/bills) No Help Needed   Moderately strenuous housework (laundry) No Help Needed   Shopping for personal items (toiletries/medicines) No Help Needed   Shopping for groceries No Help Needed   Driving No Help Needed   Climbing a flight of stairs No Help Needed   Getting to places beyond walking distances No Help Needed

## 2019-11-12 NOTE — PROGRESS NOTES
S: Mauri Escudero is a 37 y.o. WHITE OR  male who presents for HTN/blood pressure follow up. Assessment/Plan:    1. Panic anxiety syndrome  -current therapy: xanax 1mg daily, prn - will break tab in half and take 1/2 in am and 1/2 in pm; mostly taking 1tab daily  -PHQ = 11, MARIA EUGENIA = 14    2. Essential hypertension  -current therapy: none  -BP today: 137/102; BP at home: 159-123/101-81  -trial: propranolol 20mg 1x in am, monitor BP at work; may have to increase dosing to bid, based on BP     3. Migraine HA  -increased in past month d/t diet  -starting propranolol for HTN, should help with HA    RTC 4 weeks for HTN check, pt to send MyTinks message with BP readings in 2 weeks     Mauri Escudero has gained 8lbs since 17  BP today: 137/102  Checking BP at home (see log below) - high when came  Home from work so waited to take them later   No chest pain or discomfort, elevated heart rate,  palpitations or edema in extremities  No SOB  No Fatigue  No Dizziness  +HA - has been eating a lot of processed foods, stopped    Anxiety  Current therapy: xanax 1mg daily, prn - will break tab in half and take 1/2 in am and 1/2 in pm; mostly taking 1tab daily  OV note 19: Trey Valadezos many meds with Dr. Vita Warren (lexapro and inderal, zoloft, buspirone, paxil - but none of them worked for him; xanax is the only thing that has worked for him  Lexa Vargas for 6 months, kept him on xanax as only med that worked    PHQ-9 Score: 11  Over the past 2 weeks, how often have you been bothered by any of the following problems?  (Not at all = 0; several days = 1; More than 1/2 the days = 2; nearly every day = 3)  1) Little interest or pleasure in doing things:  1  2) Feeling down, depressed or hopeless:1  3) Trouble falling asleep, staying asleep or sleeping too much:0  4) Feeling tired or having little energy:1  5) Poor appetite or overeatin  6) Feeling bad about yourself - or that you're a failure or have let yourself or your family down:1  7) Trouble concentrating on things, such as reading the newspaper or watching TV: 3  8) Moving or speaking so slowly that other people could have noticed. Or, the opposite - being so fidgety or restless that you have been moving around a lot more than usual:2  9) Thoughts that you would be better off dead or of hurting yourself in some way: 0    GAD7 score: 14  Feeling nervous, anxious or on edge:   2  Not being able to stop or control worryin  Worrying too much about different things:2  Trouble relaxin  Being so restless that it is hard to sit still:  2  Becoming easily annoyed or irritable:  2  Feeling afraid as if something awful might happen:  2  If any of the above were scored more than 0, how difficult have these problems made it for you to do your work, take care of things at home, or get along with other people? Somewhat difficult         Review of Systems:  - Constitutional symptoms: no fevers/chills  - Eyes: no blurry vision or double vision   - Respiratory: no SOB, difficulty or pain with breathing, wheezes, or cough. - Gastrointestinal: no dysphagia or abdominal pain  ROS is negative otherwise.     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[address] Way History     Tobacco Use   Smoking Status Former Smoker    Packs/day: 1.00    Years: 9.00    Pack years: 9.00   Smokeless Tobacco Never Used     Social History     Substance and Sexual Activity   Alcohol Use Yes    Comment: very rare, max 3-4 drinks at one time     Social History     Substance and Sexual Activity   Drug Use No       3 most recent PHQ Screens 2019   Little interest or pleasure in doing things Not at all   Feeling down, depressed, irritable, or hopeless Not at all   Total Score PHQ 2 0   Trouble falling or staying asleep, or sleeping too much -   Feeling tired or having little energy -   Poor appetite, weight loss, or overeating -   Feeling bad about yourself - or that you are a failure or have let yourself or your family down -   Trouble concentrating on things such as school, work, reading, or watching TV -   Moving or speaking so slowly that other people could have noticed; or the opposite being so fidgety that others notice -   Thoughts of being better off dead, or hurting yourself in some way -   PHQ 9 Score -       I reviewed the following:  Past Medical History:   Diagnosis Date    Anxiety ~1997    Cancer [facility])     melanoma chest Dr Isac Spring fx [phone]    Esophageal hiatal hernia 2/4/14    GERD (gastroesophageal reflux disease)     Endoscopies normal.     Hematospermia 3/09    Hematuria     Hip joint pain 10/18/10    dr Karen Real IBS (irritable bowel syndrome)     based on history    Melanoma of trunk ([facility] [address] 10/18/10     with Dr. Edis Price fx [phone]    Panic disorder     Pulmonary nodule, right 5/09    4mm nodule right lung- incidental finding on CT scan Uro did for hematuria       Current Outpatient Medications   Medication Sig Dispense Refill    ALPRAZolam (XANAX) 1 mg tablet TAKE 1 TABLET BY MOUTH EVERY DAY AS NEEDED FOR ANXIETY 30 Tab 2    naproxen (NAPROSYN) 500 mg tablet Take 1 Tab by mouth every twelve (12) hours as needed for Pain. 30 Tab 0    dexlansoprazole (DEXILANT) 60 mg CpDB capsule (delayed release) Take 1 Cap by mouth daily.  90 Cap 3       No Known Allergies    O: VS:   Visit Vitals  BP (!) 137/102 (BP 1 Location: Left arm, BP Patient Position: Sitting)   Pulse 80   Temp 97.1 °F (36.2 °C) (Oral)   Resp 18   Ht 5' 9\" (1.753 m)   Wt 210 lb 1.6 oz (95.3 kg)   SpO2 100%   BMI 31.03 kg/m²     Data Reviewed:   · A1C:      Lab Results   Component Value Date/Time    Hemoglobin A1c 4.8 10/17/2017 11:38 AM    Hemoglobin A1c (POC) 4.9 05/08/2012 12:36 PM     · Lipids:  Lab Results   Component Value Date/Time    Cholesterol, total 155 10/17/2017 11:38 AM    HDL Cholesterol 38 (L) 10/17/2017 11:38 AM    LDL, calculated 101 (H) 10/17/2017 11:38 AM    VLDL, calculated 16 10/17/2017 11:38 AM    Triglyceride 79 10/17/2017 11:38 AM       GENERAL: Uriel Veras is in no acute distress. EYE: PERRLA. EOMs intact. Sclera anicteric without injection. RESP: Unlabored without SOB. Speaking in full sentences. Breath sounds are symmetrical bilaterally. Clear to auscultation to all fields. No wheezes. No rales or rhonchi. CV: Normal rate. Regular rhythm. S1, S2 audible. No murmur noted. No rubs, clicks or gallops noted. NEURO:  awake, alert and oriented to person, place, and time and event. Clear speech. Steady gait. HEME/LYMPH: Pedal pulses palpable 2+ x 4 extremities. No peripheral edema is noted. SKIN: Skin is warm and dry. Turgor is normal. No petechiae, no purpura, no rash. No cyanosis. No mottling, jaundice or pallor. ____________________________________________________________________  Reviewed medications and side effects. Reviewed warning signs of hypertension, stroke and heart attack. Advised on nutrition, physical activity, weight management, tobacco, and alcohol.     Patient verbalized understanding and agreed to plan of care. Follow up in 1 month for HTN as directed.

## 2020-02-18 ENCOUNTER — OFFICE VISIT (OUTPATIENT)
Dept: FAMILY MEDICINE CLINIC | Age: 45
End: 2020-02-18

## 2020-02-18 VITALS
TEMPERATURE: 97.3 F | WEIGHT: 207.1 LBS | RESPIRATION RATE: 18 BRPM | DIASTOLIC BLOOD PRESSURE: 88 MMHG | HEIGHT: 69 IN | OXYGEN SATURATION: 98 % | HEART RATE: 98 BPM | SYSTOLIC BLOOD PRESSURE: 125 MMHG | BODY MASS INDEX: 30.67 KG/M2

## 2020-02-18 DIAGNOSIS — F41.0 PANIC ANXIETY SYNDROME: ICD-10-CM

## 2020-02-18 RX ORDER — PROPRANOLOL HYDROCHLORIDE 20 MG/1
20 TABLET ORAL 3 TIMES DAILY
Qty: 30 TAB | Refills: 2 | Status: SHIPPED | OUTPATIENT
Start: 2020-02-18 | End: 2020-09-03 | Stop reason: SDUPTHER

## 2020-02-18 RX ORDER — ALPRAZOLAM 1 MG/1
TABLET ORAL
Qty: 30 TAB | Refills: 2 | Status: SHIPPED | OUTPATIENT
Start: 2020-02-18 | End: 2020-05-18

## 2020-02-18 NOTE — PATIENT INSTRUCTIONS
1) Blood pressure looks okay today - (147/102 and then 125/88). Take propranolol 20mg twice a day (remember we are using  this for migraine prevention, anxiety and blood pressure control). You can take a propranolol as needed for anxiety attacks as well - but if they don't work, then go back to the xanax. Please check your blood pressure through the week at staggered times in the day. (If you check in the morning, it should be at least one hour after your morning blood pressure medications.)      Arm monitors are most accurate. If you use a wrist monitor, make sure your wrist is at heart level. You can bring your home monitor to your next visit and have it calibrated with the machine in the office to gauge your readings. Sit  with your feet uncrossed and relax for 5 minutes before taking your BP. Keep a written record of your blood pressure readings and bring it to each appointment. If your systolic (top) blood pressure is consistently greater than 140mmHg or less than [id number] of the diastolic (bottom) number is consistently greater than 90mmHg or less than 60mmHg then please schedule an office appointment. Work on healthy eating - no salt diet, more potassium (helps flush out sodium,making healthier heart and arteries) - and incorporating daily exercise into your routine. Cardiac symptoms that would need immediate attention include: uncomfortable pressure, squeezing, fullness or pain in the center of your chest. Pain or discomfort in one or both arms, the back, neck, jaw or stomach. Shortness of breath with or without chest discomfort, breaking out in a cold sweat, nausea or lightheadedness. 2) Refill on xanax 1mg #30 with 2 refills     Anxiety is a common problem. It affects all kinds of people. There is no reason to feel embarrassed about getting treatment for anxiety.  Whether you have occasional anxiety or a diagnosable disorder, there are steps you can take to help minimize and manage feeling of anxiety. Everyone feels anxious or nervous once in a while. That is normal. But being extremely anxious or worried on most days for 6 months or longer is not normal, and is considered a medical problem. This is called \"generalized anxiety disorder. \" The disorder can make it hard to do everyday tasks. Generalized anxiety disorder is just one anxiety disorder. There are others, such as panic disorder and phobias. Symptoms of extreme or severe anxiety:  People with extreme or severe anxiety feel very worried or \"on edge\" much of the time. They can have trouble sleeping or forget things. Plus, they can have physical symptoms. For instance, people with severe anxiety often feel very tired and have tense muscles. Some get stomach aches or feel chest \"tightness. \"    Steps you can do on your own to feel better:  Deep breathing exercises: Deep breathing triggers a relaxation response, helping to change from the \"fight-or-flight\" response anxiety brings on. Inhale slowly to a count of 4, starting at your belly and then moving into your chest.  Gently hold your breath for 4 counts, then slowly exhale to 4 counts. \"Clench\" exercise - clench various zones in your body for 30 seconds, then an overall body clench  Exercise can help many people feel less anxious. Regular cardiovascular exercise (such as fast walking,) release endorphins - the \"feel good\" hormone in our body - and can reduce anxiety. Proper sleep:  Sleep is important to overall health. Not getting enough sleep can cause fatigue, inattention, and irritabililty, causing anxiety levels to increase. Healthy Diet: a healthy diet rich in whole grains, vegetables and fruits is healthier than simple carbohydrates found in processed foods. Skipping meals can cause blood sugar to drop and cause jittery feelings that could worsening underlying anxiety. It also a good idea to cut down on or stop drinking coffee and other sources of caffeine.  Caffeine can make anxiety worse. Find \"Me\" time - it is important to take some time just to focus on you and help alleviate stress in daily life. This could be daily exercise, walking the dog, sitting in a quiet place without distraction, meditation, etc.     Medical treatments include:  ? Psychotherapy - Psychotherapy involves meeting with a mental health counselor to talk about your feelings, relationships, and worries. Therapy can help you find new ways of thinking about your situation so that you feel less anxious. In therapy, you might also learn new skills to reduce anxiety. Please call your insurance company to get a list of therapists that will be covered and make an appointment. Alternatively, you can go to Psychology Today's website to find a counselor. · Go to www. Orthobond. Patients Know Best  · Enter your zip code. · Click on your insurance carrier (usually on left side of screen). · Then click any other parameters you desire. This will result in a list of providers. Click on any provider to learn more about them or see the contact information. Please choose a provider, call them, and schedule an appointment. ?Medicines - Medicines used to treat depression can relieve anxiety, too, even in people who are not depressed. Some people have psychotherapy and take medicines at the same time. Phone apps that can help with anxiety:  CALM - Use the principles of mindfulness and meditation to ease your mind and keep anxiety at [facility] [address]. The AdverCar interface is just the beginning. Once it opens, there are relaxing sounds and sleep stories. Enjoy guided meditations at various lengths to help with everything from building self-esteem to calming anxiety. SIMPLE HABIT - guided meditation for anxiety relief    NELI - Self-help for Anxiety Management - range of self-help methods.  Helps you understand what causes your anxiety, monitor your anxious thoughts and behavior over time and manage your anxiety through self-help exercises and private reflection. BOOSTERBUDDY   This eva offers a novel way for teens and young adults to improve their resilience and work toward being healthier both physically and emotionally. 7 CUPS  7 Cups uses trained, volunteer, active listeners to provide free, anonymous, and confidential emotional support to people needing help coping with acute stressors and long-term mental health issues. [facility] apps provide people with quick resources for tracking their cognitive distortions, activities, and safety plan in case of an emergency. SLEEPBOT   SleepBot is a quick and simple way for people to log their sleep and improve their sleep hygiene. [facility] EVA   The Whats Up? eva helps people monitor their mood and uses principles of CBT and acceptance commitment therapy (ACT) to help people reframe their thoughts and cognitive distortions. Keep the numbers for these national suicide hotlines: 9-433-402-TALK (5-209.957.1169) and 3-192-HLNUOLW (5-386.247.6122). If you or someone you know talks about suicide or feeling hopeless, get help right away. [address]: [phone]  Crisis: [address]: [phone]  Crisis: [address]: [phone]  Crisis: [phone]         Learning About the [address] Diet  What is the [address]? The Mediterranean diet is a style of eating rather than a diet plan. It features foods eaten in [place], [place], [place] and [place], and other countries along the [place]. It emphasizes eating foods like fish, fruits, vegetables, beans, high-fiber breads and whole grains, nuts, and olive oil. This style of eating includes limited red meat, cheese, and sweets.   Why choose the Mediterranean diet?  A Mediterranean-style diet may improve heart health. It contains more fat than other heart-healthy diets. But the fats are mainly from nuts, unsaturated oils (such as fish oils and olive oil), and certain nut or seed oils (such as canola, soybean, or flaxseed oil). These fats may help protect the heart and blood vessels. How can you get started on the Mediterranean diet? Here are some things you can do to switch to a more Mediterranean way of eating. What to eat  · Eat a variety of fruits and vegetables each day, such as grapes, blueberries, tomatoes, broccoli, peppers, figs, olives, spinach, eggplant, beans, lentils, and chickpeas. · Eat a variety of whole-grain foods each day, such as oats, brown rice, and whole wheat bread, pasta, and couscous. · Eat fish at least 2 times a week. Try tuna, salmon, mackerel, lake trout, herring, or sardines. · Eat moderate amounts of low-fat dairy products, such as milk, cheese, or yogurt. · Eat moderate amounts of poultry and eggs. · Choose healthy (unsaturated) fats, such as nuts, olive oil, and certain nut or seed oils like canola, soybean, and flaxseed. · Limit unhealthy (saturated) fats, such as butter, palm oil, and coconut oil. And limit fats found in animal products, such as meat and dairy products made with whole milk. Try to eat red meat only a few times a month in very small amounts. · Limit sweets and desserts to only a few times a week. This includes sugar-sweetened drinks like soda. The Mediterranean diet may also include red wine with your meal--1 glass each day for women and up to 2 glasses a day for men. Tips for eating at home  · Use herbs, spices, garlic, lemon zest, and citrus juice instead of salt to add flavor to foods. · Add avocado slices to your sandwich instead of eaton. · Have fish for lunch or dinner instead of red meat. Brush the fish with olive oil, and broil or grill it.   · Sprinkle your salad with seeds or nuts instead of cheese. · Cook with olive or canola oil instead of butter or oils that are high in saturated fat. · Switch from 2% milk or whole milk to 1% or fat-free milk. · Dip raw vegetables in a vinaigrette dressing or hummus instead of dips made from mayonnaise or sour cream.  · Have a piece of fruit for dessert instead of a piece of cake. Try baked apples, or have some dried fruit. Tips for eating out  · Try broiled, grilled, baked, or poached fish instead of having it fried or breaded. · Ask your  to have your meals prepared with olive oil instead of butter. · Order dishes made with marinara sauce or sauces made from olive oil. Avoid sauces made from cream or mayonnaise. · Choose whole-grain breads, whole wheat pasta and pizza crust, brown rice, beans, and lentils. · Cut back on butter or margarine on bread. Instead, you can dip your bread in a small amount of olive oil. · Ask for a side salad or grilled vegetables instead of french fries or chips. Where can you learn more? Go to http://felton-sharon.info/. Enter 630-940-9547 in the search box to learn more about \"Learning About the Mediterranean Diet. \"  Current as of: November 7, 2018  Content Version: 12.2  © 9067-4881 [employer]. Care instructions adapted under license by [employer] (which disclaims liability or warranty for this information). If you have questions about a medical condition or this instruction, always ask your healthcare professional. [place] [address] any warranty or liability for your use of this information.

## 2020-02-18 NOTE — PROGRESS NOTES
Chief Complaint   Patient presents with    Medication Check         1. Have you been to the ER, urgent care clinic since your last visit? Hospitalized since your last visit? no    2. Have you seen or consulted any other health care providers outside of the [address] [place] [address] since your last visit? Include any pap smears or colon screening.   no

## 2020-02-18 NOTE — PROGRESS NOTES
S: Carol Odell is a 53 y.o. male who presents for depression/anxiety follow up    Assessment/Plan:  1. Panic anxiety syndrome  -current therapy: xanax 1mg daily, prn - will break tab in half and take 1/2 in am and 1/2 in pm   -PHQ = 10, MARIA EUGENIA = 10 (P = 11, MARIA EUGENIA = 14 @OV 11/12/19)  -current therapy working well; discussed propranolol 20mg use when knows he will be anxious to see if it prevents panic attack (ie when he turns left at stoplights)    2. Hypertension  --current therapy: propranolol 20mg bid (doesn't remember to take it in afternoon)  -BP today = 125/88  -advised to take it at lunch (gets up around 11am) and again around dinner; use 3rd dose as needed for panic attacks  -advised to monitor BP at home and keep log; goal<140/90 make OV if consistently above goal or <110/60    RTC 3 months for anxiety/med check      HPI:  Current therapy: xanax 1mg daily, prn - will break tab in half and take 1/2 in am and 1/2 in pm     + anxiety  No excessive fatigue  + panic attacks  No sleep disturbance  No  crying spells   No delusions, hallucinations or SI/HI  OV note 7/30/19: Tried many meds with Dr. Yvonne Gutierrez (lexapro and inderal, zoloft, buspirone, paxil - but none of them worked for him; xanax is the only thing that has worked for him  Lexa Vargas for 6 months, kept him on xanax as only med that worked    Not going to counseling, no plans to f/u    HTN  BP: 125/88  Current therapy: propranolol 20mg  - taking 2x day, not 3x day    Migraines have been improving since he is eating healthier - cut out processed foods    No suicidal ideation. No homicidal ideation. PHQ-9 Score: 10  Over the past 2 weeks, how often have you been bothered by any of the following problems?  (Not at all = 0; several days = 1; More than 1/2 the days = 2; nearly every day = 3)  1) Little interest or pleasure in doing things:  1  2) Feeling down, depressed or hopeless:2  3) Trouble falling asleep, staying asleep or sleeping too much:2  4) Feeling tired or having little energy:2  5) Poor appetite or overeatin  6) Feeling bad about yourself - or that you're a failure or have let yourself or your family down:1  7) Trouble concentrating on things, such as reading the newspaper or watching TV: 2  8) Moving or speaking so slowly that other people could have noticed. Or, the opposite - being so fidgety or restless that you have been moving around a lot more than usual:1  9) Thoughts that you would be better off dead or of hurting yourself in some way:0    GAD7 score: 10  Feeling nervous, anxious or on edge:   2  Not being able to stop or control worryin  Worrying too much about different things:1  Trouble relaxin  Being so restless that it is hard to sit still:  1  Becoming easily annoyed or irritable:  1  Feeling afraid as if something awful might happen: 2  If any of the above were scored more than 0, how difficult have these problems made it for you to do your work, take care of things at home, or get along with other people? Somewhat difficult      Social History:  Nutrition: doesn't eat meat - has cut out a lot of processed foods and HA have gone away   Physical:   Social: lives girlfriend, has 27yo daughter, 23 yo son and 5 other younger kids at home  Occupation: manager at [employer] [address] Use   Smoking Status Former Smoker    Packs/day: 1.00    Years: 9.00    Pack years: 9.00    Last attempt to quit: 1997    Years since quittin.0   Smokeless Tobacco Never Used     Social History     Substance and Sexual Activity   Alcohol Use Yes    Comment: very rare, max 3-4 drinks at one time     Social History     Substance and Sexual Activity   Drug Use No         checked: Reviewed patient's prescription monitoring report at time of visit and there are no discrepancies.      Review of Systems:  - Constitutional Symptoms: no fevers, chills, weight loss  - Cardiovascular:  + palpitations, chest pain  - Respiratory: no cough or shortness of breath  - Gastrointestinal: no dysphagia or abdominal pain    I reviewed the following:  Past Medical History:   Diagnosis Date    Anxiety ~1997    Cancer [facility])     melanoma chest Dr [facility] [phone] [phone]    Esophageal hiatal hernia 2/4/14    GERD (gastroesophageal reflux disease)     Endoscopies normal.     Hematospermia 3/09    Hematuria     Hip joint pain 10/18/10    dr Alonzo Patel IBS (irritable bowel syndrome)     based on history    Melanoma of trunk ([facility] [address] 10/18/10     with Dr. Darrick Mosher [phone] [phone]    Panic disorder     Pulmonary nodule, right 5/09    4mm nodule right lung- incidental finding on CT scan Uro did for hematuria       Current Outpatient Medications   Medication Sig Dispense Refill    ALPRAZolam (XANAX) 1 mg tablet TAKE 1 TABLET BY MOUTH EVERY DAY AS NEEDED FOR ANXIETY 30 Tab 2    naproxen (NAPROSYN) 500 mg tablet Take 1 Tab by mouth every twelve (12) hours as needed for Pain. 30 Tab 0    dexlansoprazole (DEXILANT) 60 mg CpDB capsule (delayed release) Take 1 Cap by mouth daily. 90 Cap 3    propranolol (INDERAL) 20 mg tablet Take 1 Tab by mouth three (3) times daily. 30 Tab 1       No Known Allergies    O: VS:   Visit Vitals  /88   Pulse 98   Temp 97.3 °F (36.3 °C) (Oral)   Resp 18   Ht 5' 9\" (1.753 m)   Wt 207 lb 1.6 oz (93.9 kg)   SpO2 98%   BMI 30.58 kg/m²       GENERAL: Eastman Pouch is in no acute distress. Non-toxic. Well nourished. Well developed. Appropriately groomed. RESP: Breath sounds are symmetrical bilaterally. Unlabored without SOB. Speaking in full sentences. Clear to auscultation bilaterally anteriorly and posteriorly. No wheezes. No rales or rhonchi. CV: normal rate. Regular rhythm. S1, S2 audible. No murmur noted. No rubs, clicks or gallops noted. NEURO:  awake, alert and oriented to person, place, and time and event. Cranial nerves II through XII intact. Clear speech. Muscle strength is +5/5 x 4 extremities. Sensation is intact to light touch bilaterally. Steady gait. PSYCH: appropriate behavior, dress and thought processes. Good eye contact. Clear and coherent speech. Full affect. Good insight.   ____________________________________________________________________  Patient education was done. Advised on nutrition, physical activity, weight management, tobacco, alcohol and safety, including suicide hotline. Counseling included discussion of diagnosis, differentials, treatment options, prescribed treatment, warning signs and follow up. Medication risks/benefits, costs interactions and alternatives discussed with patient.       Patient agreed to plan of care and verbalized understanding. Patient was given an after visit summary which included current diagnoses, medications and vital signs.

## 2020-03-18 DIAGNOSIS — K21.9 GASTROESOPHAGEAL REFLUX DISEASE, ESOPHAGITIS PRESENCE NOT SPECIFIED: ICD-10-CM

## 2020-03-18 RX ORDER — DEXLANSOPRAZOLE 60 MG/1
CAPSULE, DELAYED RELEASE ORAL
Qty: 90 CAP | Refills: 3 | Status: SHIPPED | OUTPATIENT
Start: 2020-03-18 | End: 2021-02-01 | Stop reason: SDUPTHER

## 2020-03-23 ENCOUNTER — TELEPHONE (OUTPATIENT)
Dept: FAMILY MEDICINE CLINIC | Age: 45
End: 2020-03-23

## 2020-03-23 ENCOUNTER — DOCUMENTATION ONLY (OUTPATIENT)
Dept: FAMILY MEDICINE CLINIC | Age: 45
End: 2020-03-23

## 2020-03-23 NOTE — TELEPHONE ENCOUNTER
HIPAA verified by two patient identifiers. [employer] and renewed authorization for Dexlant 60 mg   authorization # ([id number]) From 2/22/2020 thru 2/22/2021.

## 2020-03-24 NOTE — TELEPHONE ENCOUNTER
----- Message from Diego Armstrong sent at 3/20/2020 12:58 PM EDT -----  Regarding: Trisha Hope/Refill  Pt would like to know if he can get Nexium until the issue with his  Dexilant is resolved. Pt stated he was told that he needed an prior authorization before the insurance would cover it. Pt is almost out of medication and wanted to know what can be done because if he runs out of meds his beartburn will return and it bad when it happens  Best contact number is [phone].

## 2020-03-25 RX ORDER — PHENOL/SODIUM PHENOLATE
20 AEROSOL, SPRAY (ML) MUCOUS MEMBRANE DAILY
Qty: 60 TAB | Refills: 0 | Status: SHIPPED | OUTPATIENT
Start: 2020-03-25 | End: 2020-09-03 | Stop reason: DRUGHIGH

## 2020-03-25 NOTE — TELEPHONE ENCOUNTER
----- Message from Andrew Yap sent at 3/19/2020 11:37 AM EDT -----  Regarding: FINA Queen November: [phone]  Message: Pt is requesting a call back regarding insurance company needing a prior authorization for Rx 'Dexilant 60 mg\"  to be called into   [phone]. Pt advised that he is completely out of medication.    Best contact: [phone]

## 2020-03-26 PROBLEM — Z85.820 H/O MALIGNANT MELANOMA OF SKIN: Status: ACTIVE | Noted: 2020-03-26

## 2020-04-23 ENCOUNTER — PATIENT MESSAGE (OUTPATIENT)
Dept: FAMILY MEDICINE CLINIC | Age: 45
End: 2020-04-23

## 2020-09-03 ENCOUNTER — OFFICE VISIT (OUTPATIENT)
Dept: FAMILY MEDICINE CLINIC | Age: 45
End: 2020-09-03
Payer: COMMERCIAL

## 2020-09-03 VITALS
WEIGHT: 207.9 LBS | HEIGHT: 69 IN | DIASTOLIC BLOOD PRESSURE: 112 MMHG | OXYGEN SATURATION: 98 % | HEART RATE: 89 BPM | BODY MASS INDEX: 30.79 KG/M2 | RESPIRATION RATE: 17 BRPM | TEMPERATURE: 97.1 F | SYSTOLIC BLOOD PRESSURE: 172 MMHG

## 2020-09-03 DIAGNOSIS — R03.0 ELEVATED BLOOD PRESSURE READING: ICD-10-CM

## 2020-09-03 DIAGNOSIS — Z79.899 LONG TERM PRESCRIPTION BENZODIAZEPINE USE: Primary | ICD-10-CM

## 2020-09-03 DIAGNOSIS — K21.9 GASTROESOPHAGEAL REFLUX DISEASE, ESOPHAGITIS PRESENCE NOT SPECIFIED: ICD-10-CM

## 2020-09-03 DIAGNOSIS — F41.0 PANIC ANXIETY SYNDROME: ICD-10-CM

## 2020-09-03 PROCEDURE — 99213 OFFICE O/P EST LOW 20 MIN: CPT | Performed by: NURSE PRACTITIONER

## 2020-09-03 RX ORDER — ALPRAZOLAM 1 MG/1
1 TABLET ORAL
Qty: 30 TAB | Refills: 2 | Status: SHIPPED | OUTPATIENT
Start: 2020-09-03 | End: 2021-01-13

## 2020-09-03 RX ORDER — OMEPRAZOLE 40 MG/1
40 CAPSULE, DELAYED RELEASE ORAL DAILY
Qty: 90 CAP | Refills: 1 | Status: SHIPPED | OUTPATIENT
Start: 2020-09-03 | End: 2021-02-01

## 2020-09-03 RX ORDER — PROPRANOLOL HYDROCHLORIDE 20 MG/1
20 TABLET ORAL 3 TIMES DAILY
Qty: 270 TAB | Refills: 1 | Status: SHIPPED | OUTPATIENT
Start: 2020-09-03 | End: 2021-01-13 | Stop reason: ALTCHOICE

## 2020-09-03 NOTE — PATIENT INSTRUCTIONS
1) refill on xanax for 3 months. Can make a virtual visit in 3 months, however, I have to see you face to face every 6 months. In 6 months, make an appt for a physical and come in on a 8 hour fast and we can pull labs Anxiety is a common problem. It affects all kinds of people. There is no reason to feel embarrassed about getting treatment for anxiety. Whether you have occasional anxiety or a diagnosable disorder, there are steps you can take to help minimize and manage feeling of anxiety. Everyone feels anxious or nervous once in a while. That is normal. But being extremely anxious or worried on most days for 6 months or longer is not normal, and is considered a medical problem. This is called \"generalized anxiety disorder. \" The disorder can make it hard to do everyday tasks. Generalized anxiety disorder is just one anxiety disorder. There are others, such as panic disorder and phobias. Symptoms of extreme or severe anxiety: 
People with extreme or severe anxiety feel very worried or \"on edge\" much of the time. They can have trouble sleeping or forget things. Plus, they can have physical symptoms. For instance, people with severe anxiety often feel very tired and have tense muscles. Some get stomach aches or feel chest \"tightness. \" 
 
Steps you can do on your own to feel better: 
Deep breathing exercises: Deep breathing triggers a relaxation response, helping to change from the \"fight-or-flight\" response anxiety brings on. Inhale slowly to a count of 4, starting at your belly and then moving into your chest.  Gently hold your breath for 4 counts, then slowly exhale to 4 counts. \"Clench\" exercise - clench various zones in your body for 30 seconds, then an overall body clench Exercise can help many people feel less anxious. Regular cardiovascular exercise (such as fast walking,) release endorphins - the \"feel good\" hormone in our body - and can reduce anxiety. Proper sleep:  Sleep is important to overall health. Not getting enough sleep can cause fatigue, inattention, and irritabililty, causing anxiety levels to increase. Healthy Diet: a healthy diet rich in whole grains, vegetables and fruits is healthier than simple carbohydrates found in processed foods. Skipping meals can cause blood sugar to drop and cause jittery feelings that could worsening underlying anxiety. It also a good idea to cut down on or stop drinking coffee and other sources of caffeine. Caffeine can make anxiety worse. Find \"Me\" time - it is important to take some time just to focus on you and help alleviate stress in daily life. This could be daily exercise, walking the dog, sitting in a quiet place without distraction, meditation, etc.  
 
Medical treatments include: ? Psychotherapy  Psychotherapy involves meeting with a mental health counselor to talk about your feelings, relationships, and worries. Therapy can help you find new ways of thinking about your situation so that you feel less anxious. In therapy, you might also learn new skills to reduce anxiety. Please call your insurance company to get a list of therapists that will be covered and make an appointment. Alternatively, you can go to Psychology Today's website to find a counselor. · Go to [email or web address] 
· Enter your zip code. · Click on your insurance carrier (usually on left side of screen). · Then click any other parameters you desire. This will result in a list of providers. Click on any provider to learn more about them or see the contact information. Please choose a provider, call them, and schedule an appointment. ?Medicines  Medicines used to treat depression can relieve anxiety, too, even in people who are not depressed. Some people have psychotherapy and take medicines at the same time.  
 
Phone apps that can help with anxiety: 
CALM - Use the principles of mindfulness and meditation to ease your mind and keep anxiety at [facility]. The serene interface is just the beginning. Once it opens, there are relaxing sounds and sleep stories. Enjoy guided meditations at various lengths to help with everything from building self-esteem to calming anxiety. SIMPLE HABIT - guided meditation for anxiety relief NELI - Self-help for Anxiety Management - range of self-help methods. Helps you understand what causes your anxiety, monitor your anxious thoughts and behavior over time and manage your anxiety through self-help exercises and private reflection. BOOSTERBUDDY This eva offers a novel way for teens and young adults to improve their resilience and work toward being healthier both physically and emotionally. 7 CUPS 
7 Cups uses trained, volunteer, active listeners to provide free, anonymous, and confidential emotional support to people needing help coping with acute stressors and long-term mental health issues. [address] The MoodTools and FearTools apps provide people with quick resources for tracking their cognitive distortions, activities, and safety plan in case of an emergency. SLEEPBOT SleepBot is a quick and simple way for people to log their sleep and improve their sleep hygiene. [address] UP? [facility] EVA The [facility] Up? eva helps people monitor their mood and uses principles of CBT and acceptance commitment therapy (ACT) to help people reframe their thoughts and cognitive distortions. MVRYQM  - emotional health assistant. The eva employs a mood tracker, a chat interface, and guided mindfulness and learns from each user with AI to deliver the most effective support available. [facility] eva that allows you to speak to a therapist without leaving home, connecting you to [address]345[address] of licensed therapists with medical training. Other apps: Stephane, [facility], [employer], [address] [place] [address] Quit That!  - eva for addictions Keep the numbers for these national suicide hotlines: 6-263-948-TALK (1-664.898.4642) and 9-220-BMOYKOO (8-934.367.1342). If you or someone you know talks about suicide or feeling hopeless, get help right away. [address] Clinic: [phone] Crisis: [phone] [address] [facility] Clinic: [phone] Crisis: [phone] [address] [facility] Clinic: [phone] Crisis: [phone] 2) Blood pressure high today Please check your blood pressure through the week at staggered times in the day. (If you check in the morning, it should be at least one hour after your morning blood pressure medications.) Arm monitors are most accurate. If you use a wrist monitor, make sure your wrist is at heart level. You can bring your home monitor to your next visit and have it calibrated with the machine in the office to gauge your readings. Sit  with your feet uncrossed and relax for 5 minutes before taking your BP. Keep a written record of your blood pressure readings and bring it to each appointment. If your systolic (top) blood pressure is consistently greater than 140mmHg or less than 703NYCR, OR the diastolic (bottom) number is consistently greater than 90mmHg or less than 60mmHg, then please schedule an office appointment. Work on healthy eating - no salt diet, more potassium (helps flush out sodium,making healthier heart and arteries) - and incorporating daily exercise into your routine. Losing weight can help reduce your blood pressure! Cardiac symptoms that would need immediate attention include: uncomfortable pressure, squeezing, fullness or pain in the center of your chest. Pain or discomfort in one or both arms, the back, neck, jaw or stomach. Shortness of breath with or without chest discomfort, breaking out in a cold sweat, nausea or lightheadedness. 3) Gastroesophageal Reflux (GERD) Education: 1. GERD can occur when stomach acid or food flows back into your esophagus, and can cause heartburn, sore throat, dry cough and other symptoms. 2. Anti-reflux measures that can be used include raising the head of the bed, avoiding tight clothing or belts, avoiding eating late at night and not lying down shortly after mealtime and losing weight. 2. Avoid aspirin, ibuprofen, caffeine, peppermints, alcohol and tobacco, as these can make GERD symptoms worse. 3. OTC H2 blockers (such as Pepcid AC, Zantac) and/or antacids (such as Maalox, Tums) are often very helpful for occasional use. If symptoms are persisting, prescription strength H2 blockers or a trial of Proton Pump Inhibitors (PPIs), (such as omeprazole (Prilosec), can be used. However, PPIs and H2 blockers can reduce absorption of calcium, magnesium and other nutrients, so only a short course (4-8 weeks) is recommended. 4. Please call the office if you have persistent symptoms, trouble swallowing, weight loss or GI bleeding. Anxiety Disorder: Care Instructions Your Care Instructions Anxiety is a normal reaction to stress. Difficult situations can cause you to have symptoms such as sweaty palms and a nervous feeling. In an anxiety disorder, the symptoms are far more severe. Constant worry, muscle tension, trouble sleeping, nausea and diarrhea, and other symptoms can make normal daily activities difficult or impossible. These symptoms may occur for no reason, and they can affect your work, school, or social life. Medicines, counseling, and self-care can all help. Follow-up care is a key part of your treatment and safety. Be sure to make and go to all appointments, and call your doctor if you are having problems. It's also a good idea to know your test results and keep a list of the medicines you take. How can you care for yourself at home? · Take medicines exactly as directed. Call your doctor if you think you are having a problem with your medicine. · Go to your counseling sessions and follow-up appointments. · Recognize and accept your anxiety. Then, when you are in a situation that makes you anxious, say to yourself, \"This is not an emergency. I feel uncomfortable, but I am not in danger. I can keep going even if I feel anxious. \" · Be kind to your body: 
? Relieve tension with exercise or a massage. ? Get enough rest. 
? Avoid alcohol, caffeine, nicotine, and illegal drugs. They can increase your anxiety level and cause sleep problems. ? Learn and do relaxation techniques. See below for more about these techniques. · Engage your mind. Get out and do something you enjoy. Go to a funny movie, or take a walk or hike. Plan your day. Having too much or too little to do can make you anxious. · Keep a record of your symptoms. Discuss your fears with a good friend or family member, or join a support group for people with similar problems. Talking to others sometimes relieves stress. · Get involved in social groups, or volunteer to help others. Being alone sometimes makes things seem worse than they are. · Get at least 30 minutes of exercise on most days of the week to relieve stress. Walking is a good choice. You also may want to do other activities, such as running, swimming, cycling, or playing tennis or team sports. Relaxation techniques Do relaxation exercises 10 to 20 minutes a day. You can play soothing, relaxing music while you do them, if you wish. · Tell others in your house that you are going to do your relaxation exercises. Ask them not to disturb you. · Find a comfortable place, away from all distractions and noise. · Lie down on your back, or sit with your back straight. · Focus on your breathing. Make it slow and steady. · Breathe in through your nose. Breathe out through either your nose or mouth. · Breathe deeply, filling up the area between your navel and your rib cage. Breathe so that your belly goes up and down. · Do not hold your breath. · Breathe like this for 5 to 10 minutes. Notice the feeling of calmness throughout your whole body. As you continue to breathe slowly and deeply, relax by doing the following for another 5 to 10 minutes: · Tighten and relax each muscle group in your body. You can begin at your toes and work your way up to your head. · Imagine your muscle groups relaxing and becoming heavy. · Empty your mind of all thoughts. · Let yourself relax more and more deeply. · Become aware of the state of calmness that surrounds you. · When your relaxation time is over, you can bring yourself back to alertness by moving your fingers and toes and then your hands and feet and then stretching and moving your entire body. Sometimes people fall asleep during relaxation, but they usually wake up shortly afterward. · Always give yourself time to return to full alertness before you drive a car or do anything that might cause an accident if you are not fully alert. Never play a relaxation tape while you drive a car. When should you call for help? [id number] anytime you think you may need emergency care. For example, call if: 
· You feel you cannot stop from hurting yourself or someone else. Keep the numbers for these national suicide hotlines: 1-215-721-TALK (5-859.427.3349) and 6-509-TWWJXKZ (7-839.789.1927). If you or someone you know talks about suicide or feeling hopeless, get help right away. Watch closely for changes in your health, and be sure to contact your doctor if: 
· You have anxiety or fear that affects your life. · You have symptoms of anxiety that are new or different from those you had before. Where can you learn more? Go to http://www.CogniK.com/ Enter P754 in the search box to learn more about \"Anxiety Disorder: Care Instructions. \" 
 Current as of: January 31, 2020               Content Version: 12.5 © 3648-1810 Healthwise, Incorporated. Care instructions adapted under license by [employer] (which disclaims liability or warranty for this information). If you have questions about a medical condition or this instruction, always ask your healthcare professional. [address] any warranty or liability for your use of this information.

## 2020-09-03 NOTE — PROGRESS NOTES
S: Raul Newsome is a 53 y.o. male who presents for depression/anxiety follow up    Assessment/Plan:    1. Panic anxiety syndrome  -current therapy: xanax 1mg daily, prn - will break tab in half and take 1/2 in am and 1/2 in pm   - PHQ = 7, MARIA EUGENIA = 13; (P = 10, MARIA EUGENIA = 10 @OV 2/2020; P = 11, MARIA EUGENIA = 14 @OV 11/12/19)  -has been out of meds past 3 days, anxiety is high, as reflected in BP today  -discussed using 3rd dose of propranolol 20mg as needed for anxiety attack    2. Essential Hypertension  -current therapy: propranolol 20mg bid (doesn't remember to take all the time - has been out of rx  -BP today: 172/112 - WCS and dynamap makes anxiety very high;  BP at home <140/90  -advised to monitor BP at home and keep log; goal<140/90 make OV if consistently above goal or <110/60    3. GERD  -current therapy:  dexilant 60mg + omeprazole 20mg OTC not really helping  -Hx of hiatal hernia  -discussed both are PPI but pt has had success in past with both - requesting higher dose omeprazole    RTC VV 3 months for dep/anx check, 6 months for CPE w/labs      HPI:  Current therapy: xanax 1mg daily, prn - will break tab in half and take 1/2 in am and 1/2 in pm   + anxiety  + excessive fatigue  + panic attacks  No sleep disturbance  No  crying spells   No delusions, hallucinations or SI/HI  Not going to counseling, no plans to f/u      HTN  BP today = 172/112, very anxious when BP cuff place, out of xanax as well  States BP at home is never above 130's/90  Advised to monitor    GERD  Hx of hiatal hernia  Taking dexilant 60mg + omeprazole 20mg OTC not really helping  Discussed both are PPI but pt has had success in past with both - requesting higher dose omeprazole      No suicidal ideation. No homicidal ideation. PHQ-9 Score: 7  Over the past 2 weeks, how often have you been bothered by any of the following problems?  (Not at all = 0; several days = 1; More than 1/2 the days = 2; nearly every day = 3)  1) Little interest or pleasure in doing things:  1  2) Feeling down, depressed or hopeless:1  3) Trouble falling asleep, staying asleep or sleeping too much:0  4) Feeling tired or having little energy:2  5) Poor appetite or overeatin  6) Feeling bad about yourself - or that you're a failure or have let yourself or your family down:0  7) Trouble concentrating on things, such as reading the newspaper or watching TV: 2  8) Moving or speaking so slowly that other people could have noticed. Or, the opposite - being so fidgety or restless that you have been moving around a lot more than usual:0  9) Thoughts that you would be better off dead or of hurting yourself in some way:0    GAD7 score: 13  Feeling nervous, anxious or on edge:   3  Not being able to stop or control worryin  Worrying too much about different things:2  Trouble relaxin  Being so restless that it is hard to sit still:  1  Becoming easily annoyed or irritable:  1  Feeling afraid as if something awful might happen: 2  If any of the above were scored more than 0, how difficult have these problems made it for you to do your work, take care of things at home, or get along with other people? 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      Review of Systems:  - Constitutional Symptoms: no fevers, chills, weight loss  - Cardiovascular: +palpitations, no chest pain  - Respiratory: no cough or shortness of breath  - Gastrointestinal: no dysphagia or abdominal pain, +acid reflux    I reviewed the following:  Past Medical History:   Diagnosis Date    Anxiety ~1997    Cancer [facility])     melanoma chest Dr Nereida Castellanos fx [phone]    Esophageal hiatal hernia 2/4/14    GERD (gastroesophageal reflux disease)     Endoscopies normal.     Hematospermia 3/09    Hematuria     Hip joint pain 10/18/10    dr Mel Munoz IBS (irritable bowel syndrome)     based on history    Melanoma of trunk ([address] 10/18/10     with Dr. Adia Clifton fx [phone]    Panic disorder     Pulmonary nodule, right 5/09    4mm nodule right lung- incidental finding on CT scan Uro did for hematuria       Current Outpatient Medications   Medication Sig Dispense Refill    ALPRAZolam (XANAX) 1 mg tablet TAKE 1 TABLET BY MOUTH EVERY DAY AS NEEDED FOR ANXIETY 30 Tab 0    Dexilant 60 mg CpDB capsule (delayed release) TAKE 1 CAPSULE BY MOUTH EVERY DAY 90 Cap 3    Omeprazole delayed release (PRILOSEC D/R) 20 mg tablet Take 1 Tab by mouth daily. 60 Tab 0    propranolol (INDERAL) 20 mg tablet Take 1 Tab by mouth three (3) times daily. 30 Tab 2    naproxen (NAPROSYN) 500 mg tablet Take 1 Tab by mouth every twelve (12) hours as needed for Pain. 30 Tab 0       No Known Allergies    O: VS:   Visit Vitals  BP (!) 172/112 (BP 1 Location: Right arm, BP Patient Position: Sitting)   Pulse 89   Temp 97.1 °F (36.2 °C) (Oral)   Resp 17   Ht 5' 9\" (1.753 m)   Wt 207 lb 14.4 oz (94.3 kg)   SpO2 98%   BMI 30.70 kg/m²       GENERAL: Geoff Ny is in no acute distress. Non-toxic. Well nourished. Well developed. Appropriately groomed. RESP: Breath sounds are symmetrical bilaterally. Unlabored without SOB. Speaking in full sentences. Clear to auscultation bilaterally anteriorly and posteriorly. No wheezes. No rales or rhonchi. CV: normal rate. Regular rhythm. S1, S2 audible.   No murmur noted.  No rubs, clicks or gallops noted. NEURO:  awake, alert and oriented to person, place, and time and event. Cranial nerves II through XII intact. Clear speech. Muscle strength is +5/5 x 4 extremities. Sensation is intact to light touch bilaterally. Steady gait. PSYCH: appropriate behavior, dress and thought processes. Good eye contact. Clear and coherent speech. Full affect. Good insight.   ____________________________________________________________________  Patient education was done. Advised on nutrition, physical activity, weight management, tobacco, alcohol and safety, including suicide hotline. Counseling included discussion of diagnosis, differentials, treatment options, prescribed treatment, warning signs and follow up. Medication risks/benefits, costs interactions and alternatives discussed with patient.       Patient agreed to plan of care and verbalized understanding. Patient was given an after visit summary which included current diagnoses, medications and vital signs.     Pt agrees to follow up in 3 months

## 2020-09-03 NOTE — PROGRESS NOTES
Shoshana Brody is a 53 y.o. male    HIPAA verified by two patient identifiers. Chief Complaint   Patient presents with    Medication Refill    Medication Check     Pt would like to discuss an alternative for heart burn medication. Pt states he bought something for posture 2 months ago and wore it for a whole day and noticed something weird with his left hand is now still experiencing it as well as numbness in his thumb. Pt states that 2 weeks ago he had a headache for 4 days in a row. Pt states he took 3 BCs 2 Aleve's and Tylenol Extra Strength for pain. 1. Have you been to the ER, urgent care clinic since your last visit? Hospitalized since your last visit? No    2. Have you seen or consulted any other health care providers outside of the [address] [place] [address] since your last visit? Include any pap smears or colon screening.  No    Visit Vitals  BP (!) 172/112 (BP 1 Location: Right arm, BP Patient Position: Sitting)   Pulse 89   Temp 97.1 °F (36.2 °C) (Oral)   Resp 17   Ht 5' 9\" (1.753 m)   Wt 207 lb 14.4 oz (94.3 kg)   SpO2 98%   BMI 30.70 kg/m²       Pain Scale: 0 - No pain/10  Pain Location:

## 2020-09-08 LAB — DRUGS UR: NORMAL

## 2021-01-12 ENCOUNTER — VIRTUAL VISIT (OUTPATIENT)
Dept: FAMILY MEDICINE CLINIC | Age: 46
End: 2021-01-12
Payer: COMMERCIAL

## 2021-01-12 DIAGNOSIS — F41.0 PANIC ANXIETY SYNDROME: ICD-10-CM

## 2021-01-12 PROCEDURE — 99203 OFFICE O/P NEW LOW 30 MIN: CPT | Performed by: INTERNAL MEDICINE

## 2021-01-12 RX ORDER — ALPRAZOLAM 1 MG/1
1 TABLET ORAL
Qty: 30 TAB | Refills: 2 | Status: CANCELLED | OUTPATIENT
Start: 2021-01-12

## 2021-01-12 RX ORDER — ALPRAZOLAM 1 MG/1
1 TABLET ORAL
Qty: 30 TAB | Refills: 0 | Status: CANCELLED | OUTPATIENT
Start: 2021-01-12

## 2021-01-12 NOTE — TELEPHONE ENCOUNTER
Callers first & last [address]: [phone]  Reason for call: refill   Medication name & Strength: Xanax 1 mg   [facility]   Dr/NP: TODD Hernandez   Details to clarify request:  Patient is requesting a call back regarding refill on Xanax. BCN: [phone]      No access to      Last visit 09/03/2020 TODD Hernandez   Next appointment Nothing scheduled   Previous refill encounter(s)   09/03/2020 Xanax #30 with 2 refill     Requested Prescriptions     Pending Prescriptions Disp Refills    ALPRAZolam (XANAX) 1 mg tablet 30 Tab 0     Sig: Take 1 Tab by mouth daily as needed for Anxiety.  Indications: anxious

## 2021-01-12 NOTE — PROGRESS NOTES
Chief Complaint   Patient presents with   • New Patient   • Anxiety   • Medication Refill     HPI:  Neisha Mchugh is a 45 y.o. male who was seen by synchronous (real-time) audio-video technology on 1/12/2021 for New Patient, Anxiety, and Medication Refill    Assessment & Plan:   Diagnoses and all orders for this visit:    1. Panic anxiety syndrome  -     REFERRAL TO PSYCHIATRY      I spent at least 20 minutes on this visit with this established patient.  [id number]  Subjective:   Neisha Mchugh is a 45 y.o.  male with h/o gerd, panic anxiety syndrome is seen as a new patient.  He is requesting for refill of xanax and dxilant/CpDB. Advised, I will not be able to refill medication.   Explained I will give him a referral and additional list of specialist to call for appointment.  However, he can follow him for other medical problems including hypertension, GERD, pulmonary nodules. Patient did not like the ideal.    Prior to Admission medications    Medication Sig Start Date End Date Taking? Authorizing Provider   ALPRAZolam (XANAX) 1 mg tablet Take 1 Tab by mouth daily as needed for Anxiety. Indications: anxious 9/3/20  Yes Maggie Hope NP   Dexilant 60 mg CpDB capsule (delayed release) TAKE 1 CAPSULE BY MOUTH EVERY DAY 3/18/20  Yes Eunice Mckinnon NP   propranoloL (INDERAL) 20 mg tablet Take 1 Tab by mouth three (3) times daily. 9/3/20   Maggie Hope NP   omeprazole (PRILOSEC) 40 mg capsule Take 1 Cap by mouth daily. 9/3/20   Maggie Hope NP   naproxen (NAPROSYN) 500 mg tablet Take 1 Tab by mouth every twelve (12) hours as needed for Pain. 4/16/19   Gita Wilcox MD     Patient Active Problem List   Diagnosis Code   • Panic anxiety syndrome F41.0   • GERD (gastroesophageal reflux disease) K21.9   • Pulmonary nodules/lesions, multiple R91.8   • Low back pain M54.5   • H/O malignant melanoma of skin Z85.820     Current Outpatient Medications   Medication Sig Dispense Refill   • ALPRAZolam  [place]) 1 mg tablet Take 1 Tab by mouth daily as needed for Anxiety. Indications: anxious 30 Tab 2    Dexilant 60 mg CpDB capsule (delayed release) TAKE 1 CAPSULE BY MOUTH EVERY DAY 90 Cap 3    propranoloL (INDERAL) 20 mg tablet Take 1 Tab by mouth three (3) times daily. 270 Tab 1    omeprazole (PRILOSEC) 40 mg capsule Take 1 Cap by mouth daily. 90 Cap 1    naproxen (NAPROSYN) 500 mg tablet Take 1 Tab by mouth every twelve (12) hours as needed for Pain. 30 Tab 0     No Known Allergies  Past Medical History:   Diagnosis Date    Anxiety ~    Cancer [facility])     melanoma chest Dr Sloane Aldana fx [phone]    Esophageal hiatal hernia 14    GERD (gastroesophageal reflux disease)     Endoscopies normal.     Hematospermia 3/09    Hematuria     Hip joint pain 10/18/10    dr Smith Sessions IBS (irritable bowel syndrome)     based on history    Melanoma of trunk ([address] 10/18/10     with Dr. Bennett Cos fx [phone]    Panic disorder     Pulmonary nodule, right     4mm nodule right lung- incidental finding on CT scan Uro did for hematuria     Past Surgical History:   Procedure Laterality Date    HX ENDOSCOPY  14    EGD Dr Emily Ferrara HX ORTHOPAEDIC  07     herniated disc surg in [address] [facility]      mole right chest- now melanoma    HX OTHER SURGICAL  11/23/10    dr Beryle Lacer for Melanoma excision    HX OTHER SURGICAL      STRETTA procedure.   Done in [place]     Family History   Problem Relation Age of Onset    Heart Disease Father     Psychiatric Disorder Mother         suicide, lifelong severe anxiety    Cancer Maternal Grandfather      Social History     Tobacco Use    Smoking status: Former Smoker     Packs/day: 1.00     Years: 9.00     Pack years: 9.00     Quit date: 1997     Years since quittin.9    Smokeless tobacco: Never Used   Substance Use Topics    Alcohol use: Yes     Comment: very rare, max 3-4 drinks at one time     ROS:  As per hpi    Objective:   No flowsheet data found. General: alert, cooperative, no distress   Mental  status: normal mood, behavior, speech, dress, motor activity, and thought processes, able to follow commands   HENT: NCAT   Neck: no visualized mass   Resp: no respiratory distress   Neuro: no gross deficits   Skin: no discoloration or lesions of concern on visible areas   Psychiatric: normal affect, consistent with stated mood, no evidence of hallucinations     Additional exam findings: We discussed the expected course, resolution and complications of the diagnosis(es) in detail. Medication risks, benefits, costs, interactions, and alternatives were discussed as indicated. I advised him to contact the office if his condition worsens, changes or fails to improve as anticipated. He expressed understanding with the diagnosis(es) and plan. Carrie Solizsharifa, who was evaluated through a patient-initiated, synchronous (real-time) audio-video encounter, and/or his healthcare decision maker, is aware that it is a billable service, with coverage as determined by his insurance carrier. He provided verbal consent to proceed: Yes, and patient identification was verified. It was conducted pursuant to the emergency declaration under the [facility], [address] [place] [address] authority and the [employer] Act. A caregiver was present when appropriate. Ability to conduct physical exam was limited. I was in the office. The patient was at home.       Ibrahima Rhodes MD

## 2021-01-13 ENCOUNTER — OFFICE VISIT (OUTPATIENT)
Dept: INTERNAL MEDICINE CLINIC | Age: 46
End: 2021-01-13
Payer: COMMERCIAL

## 2021-01-13 VITALS
RESPIRATION RATE: 16 BRPM | HEART RATE: 81 BPM | SYSTOLIC BLOOD PRESSURE: 161 MMHG | BODY MASS INDEX: 32.44 KG/M2 | HEIGHT: 69 IN | OXYGEN SATURATION: 100 % | DIASTOLIC BLOOD PRESSURE: 114 MMHG | TEMPERATURE: 97.2 F | WEIGHT: 219 LBS

## 2021-01-13 DIAGNOSIS — Z79.899 LONG-TERM CURRENT USE OF BENZODIAZEPINE: ICD-10-CM

## 2021-01-13 DIAGNOSIS — R03.0 ELEVATED BLOOD PRESSURE READING WITHOUT DIAGNOSIS OF HYPERTENSION: ICD-10-CM

## 2021-01-13 DIAGNOSIS — F41.0 PANIC ANXIETY SYNDROME: Primary | ICD-10-CM

## 2021-01-13 LAB — TSH SERPL DL<=0.05 MIU/L-ACNC: 1.94 UIU/ML (ref 0.34–5.6)

## 2021-01-13 PROCEDURE — 84443 ASSAY THYROID STIM HORMONE: CPT | Performed by: NURSE PRACTITIONER

## 2021-01-13 PROCEDURE — 99213 OFFICE O/P EST LOW 20 MIN: CPT | Performed by: NURSE PRACTITIONER

## 2021-01-13 RX ORDER — ALPRAZOLAM 1 MG/1
.5-1 TABLET ORAL
Qty: 30 TAB | Refills: 0 | Status: SHIPPED | OUTPATIENT
Start: 2021-01-13 | End: 2021-02-16

## 2021-01-13 RX ORDER — METOPROLOL SUCCINATE 50 MG/1
50 TABLET, EXTENDED RELEASE ORAL DAILY
Qty: 30 TAB | Refills: 2 | Status: SHIPPED | OUTPATIENT
Start: 2021-01-13 | End: 2021-04-06

## 2021-01-13 NOTE — PROGRESS NOTES
Ashley Bailey is a 50 y.o. male    Chief Complaint   Patient presents with    Medication Evaluation     Med discuss/Acute care       Visit Vitals  BP (!) 161/114 (BP 1 Location: Left arm, BP Patient Position: Sitting)   Pulse 81   Temp 97.2 °F (36.2 °C)   Resp 16   Ht 5' 9\" (1.753 m)   Wt 219 lb (99.3 kg)   SpO2 100%   BMI 32.34 kg/m²           1. Have you been to the ER, urgent care clinic since your last visit? Hospitalized since your last visit? No     2. Have you seen or consulted any other health care providers outside of the [address] [place] [address] since your last visit? Include any pap smears or colon screening.  No

## 2021-01-13 NOTE — PROGRESS NOTES
Subjective:     Chief Complaint   Patient presents with    Medication Evaluation     Med discuss/Acute care        History of present illness:  Sue Rock is a 50 y.o. male who is here today to establish with me as a new patient, and to obtain refills on his Xanax. The patient states a longstanding history of anxiety and panic disorder. He states the onset was approximately in his 25s, and has been off and on since then. He cites his triggers as \"new places, situations, people, or driving a vehicle. \"  He states his episodes come and go, and are variable in intensity. He states sometimes he becomes so overwhelmed with his anxiety he becomes incapacitated for a period of time. At those moments, he takes his Xanax, and states an appreciable relief of his symptoms. He is able to work, but has intermittent anxiety attacks that may or may not require him using the medication. He denies any alcohol abuse, but states he occasionally smokes marijuana to calm himself at night. Additionally, the patient has been trialed on a variety of psychotropics in the past including: Paxil, citalopram, Wellbutrin, Zoloft, and others he forgot the names of. He is unsure if he is ever been tried on Ativan or Klonopin. He does not think he is ever used Valium. The patient also has a history of elevated blood pressure readings, especially when he is having panic episodes. His blood pressure is high today, but is not surprising to him at all. He states he has been using propranolol 20 mg 3 times a day in the past, but states he has not seen or felt any appreciable benefit of this medication. He would prefer medication that is once a day, as he occasionally forgets to take the mid-day dose of propanolol.     Patient Active Problem List   Diagnosis Code    Panic anxiety syndrome F41.0    GERD (gastroesophageal reflux disease) K21.9    Pulmonary nodules/lesions, multiple R91.8    Low back pain M54.5    H/O malignant melanoma of skin Z85.820      Past Medical History:   Diagnosis Date    Anxiety ~    Cancer [facility])     melanoma chest Dr Jose Luis Padilla fx [phone]    Esophageal hiatal hernia 14    GERD (gastroesophageal reflux disease)     Endoscopies normal.     Hematospermia 3/09    Hematuria     Hip joint pain 10/18/10    dr Silvia Izaguirre IBS (irritable bowel syndrome)     based on history    Melanoma of trunk ([address] 10/18/10     with Dr. Shy Sparks fx [phone]    Panic disorder     Pulmonary nodule, right     4mm nodule right lung- incidental finding on CT scan Uro did for hematuria      No Known Allergies   Family History   Problem Relation Age of Onset    Heart Disease Father     Psychiatric Disorder Mother         suicide, lifelong severe anxiety    Cancer Maternal Grandfather       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[employer]     Employer: [employer] resource strain: Not on file    Food insecurity     Worry: Not on file     Inability: Not on file    Transportation needs     Medical: Not on file     Non-medical: Not on file   Tobacco Use    Smoking status: Former Smoker     Packs/day: 1.00     Years: 9.00     Pack years: 9.00     Quit date: 1997     Years since quittin.9    Smokeless tobacco: Never Used   Substance and Sexual Activity    Alcohol use: Yes     Comment: very rare, max 3-4 drinks at one time    Drug use: No    Sexual activity: Yes     Partners: Female   Lifestyle    Physical activity     Days per week: Not on file     Minutes per session: Not on file    Stress: Not on file   Relationships    Social connections     Talks on phone: Not on file     Gets together: Not on file     Attends Latter day service: Not on file     Active member of club or organization: Not on file     Attends meetings of clubs or organizations: Not on file     Relationship status: Not on file    Intimate partner violence     Fear of current or ex partner: Not on file     Emotionally abused: Not on file     Physically abused: Not on file     Forced sexual activity: Not on file   Other Topics Concern    Not on file   Social History Narrative    Not on file     Prior to Admission medications    Medication Sig Start Date End Date Taking? Authorizing Provider   metoprolol succinate (TOPROL-XL) 50 mg XL tablet Take 1 Tab by mouth daily. Indications: high blood pressure 1/13/21  Yes Ronda LOMELI NP   ALPRAZolam [place]) 1 mg tablet Take 0.5-1 Tabs by mouth daily as needed for Anxiety. Indications: anxious, panic disorder 1/13/21  Yes Ronda LOMELI NP   omeprazole (PRILOSEC) 40 mg capsule Take 1 Cap by mouth daily. 9/3/20  Yes Marietta Aguilar NP   Dexilant 60 mg CpDB capsule (delayed release) TAKE 1 CAPSULE BY MOUTH EVERY DAY 3/18/20  Yes Felice Andrade NP   propranoloL (INDERAL) 20 mg tablet Take 1 Tab by mouth three (3) times daily. 9/3/20 1/13/21  Marietta Aguilar NP   ALPRAZolam [place]) 1 mg tablet Take 1 Tab by mouth daily as needed for Anxiety. Indications: anxious 9/3/20 1/13/21  Jono ESCAMILLA NP   naproxen (NAPROSYN) 500 mg tablet Take 1 Tab by mouth every twelve (12) hours as needed for Pain. 4/16/19   Erik Emanuel MD        Review of Systems              Constitutional:  He denies fever, weight loss, sweats or fatigue. Respiratory:  No cough, wheezing or shortness of breath. No sputum production. Cardiac:  Denies chest pain, palpitations, unexplained indigestion, syncope, edema, PND or orthopnea. Skin:  No recent rashes or unexplained bruising. Neurological:  No numbness, tingling, burning paresthesias or loss of motor strength.   No syncope, dizziness, frequent headaches or memory loss.  Hematologic:  No unexplained bruising or purpura. Lymphatic: No lymph node enlargement  Psychiatric: See HPI. Objective:     Vitals:    01/13/21 0841   BP: (!) 161/114   Pulse: 81   Resp: 16   Temp: 97.2 °F (36.2 °C)   SpO2: 100%   Weight: 219 lb (99.3 kg)   Height: 5' 9\" (1.753 m)   PainSc:   0 - No pain       Body mass index is 32.34 kg/m². Physical Examination:              General Appearance:  Well-developed, well-nourished, no acute distress. Neck:  Supple without thyromegaly or adenopathy. Lungs:  Clear to auscultation and percussion X5. No abnormal breath sounds. Cardiac:  Regular rate and rhythm without murmur. Skin:  No rash or unusual mole changes noted. Neurological: . Alert and oriented x3. Station and gait normal.   Hematologic:   No purpura or petechiae  Psychiatric: Patient speech has normal rate and rhythm, and responds to questions appropriately. The patient's affect is moderately anxious, and mood is anxious as well. Memory appears to be intact. The patient's thought content and processing is appropriate and goal oriented. Patient denies any auditory visual hallucination or any homicidal or suicidal ideation/thoughts. Assessment/Plan:           ICD-10-CM ICD-9-CM    1. Panic anxiety syndrome  F41.0 300.01 metoprolol succinate (TOPROL-XL) 50 mg XL tablet      ALPRAZolam (XANAX) 1 mg tablet      TSH 3RD GENERATION   2. Elevated blood pressure reading without diagnosis of hypertension  R03.0 796.2 metoprolol succinate (TOPROL-XL) 50 mg XL tablet      TSH 3RD GENERATION   3. Long-term current use of benzodiazepine  Z79.899 V58.69 TOXASSURE SELECT 13 (MW)       Impressions/Plan:    1: Patient continues to be positive for panic disorder and anxiety attacks. I will refill the patient's Xanax today at 1 mg to be taken 1/2 to 1 tablet daily as needed. 2: I have discussed the option of using Ativan or Klonopin in the future. Patient acknowledges.   3: Patient to discontinue propranolol 20 mg 3 times a day, and change to Toprol-XL 50 mg daily. Patient has been instructed to monitor his blood pressure and record results at least twice daily. Patient agrees. 4: We will do tox-assure screen as well as TSH today. 5: Patient to follow-up with me in approximately 2 weeks or sooner as needed. Patient states understanding and agrees with plan. Orders Placed This Encounter    TOXASSURE SELECT 13 (MW)    metoprolol succinate (TOPROL-XL) 50 mg XL tablet    ALPRAZolam (XANAX) 1 mg tablet       Follow-up and Dispositions    · Return in about 2 weeks (around 1/27/2021) for Follow up med check. No results found for any visits on 01/13/21. Jose Mehta NP    The patient was given after the visit summary the patient verbalized an understanding of the plans and problems as explained.

## 2021-01-17 LAB — DRUGS UR: NORMAL

## 2021-02-01 ENCOUNTER — OFFICE VISIT (OUTPATIENT)
Dept: INTERNAL MEDICINE CLINIC | Age: 46
End: 2021-02-01
Payer: COMMERCIAL

## 2021-02-01 VITALS
WEIGHT: 220 LBS | HEART RATE: 76 BPM | RESPIRATION RATE: 16 BRPM | SYSTOLIC BLOOD PRESSURE: 122 MMHG | TEMPERATURE: 97.3 F | BODY MASS INDEX: 32.58 KG/M2 | OXYGEN SATURATION: 98 % | DIASTOLIC BLOOD PRESSURE: 88 MMHG | HEIGHT: 69 IN

## 2021-02-01 DIAGNOSIS — K44.9 HIATAL HERNIA: ICD-10-CM

## 2021-02-01 DIAGNOSIS — Z79.899 LONG-TERM CURRENT USE OF BENZODIAZEPINE: ICD-10-CM

## 2021-02-01 DIAGNOSIS — K21.9 GASTROESOPHAGEAL REFLUX DISEASE WITHOUT ESOPHAGITIS: ICD-10-CM

## 2021-02-01 DIAGNOSIS — F41.0 PANIC ANXIETY SYNDROME: Primary | ICD-10-CM

## 2021-02-01 PROCEDURE — 99213 OFFICE O/P EST LOW 20 MIN: CPT | Performed by: NURSE PRACTITIONER

## 2021-02-01 RX ORDER — DEXLANSOPRAZOLE 60 MG/1
CAPSULE, DELAYED RELEASE ORAL
Qty: 90 CAP | Refills: 3 | Status: SHIPPED | OUTPATIENT
Start: 2021-02-01 | End: 2022-01-28

## 2021-02-01 RX ORDER — VENLAFAXINE 75 MG/1
75 TABLET ORAL 2 TIMES DAILY
Qty: 60 TAB | Refills: 2 | Status: SHIPPED | OUTPATIENT
Start: 2021-02-01 | End: 2021-07-12 | Stop reason: DRUGHIGH

## 2021-02-01 NOTE — PROGRESS NOTES
Chief Complaint   Patient presents with    Follow-up     2 wk follow up       SUBJECTIVE:    Diana Valentine is a 50 y.o. male who is here today for a follow up appointment regarding his panic disorder, elevated blood pressure, and gastroesophageal reflux disease. At her last encounter, the patient came in presenting with severe panic attacks and requesting refill of Xanax. He states he has been using this at 1/2 to 1 tablet on an as-needed basis. He still has a quantity of 11 remaining from his last prescription. He states the medication has worked well to curb his anxiety. In addition, the patient was given Toprol-XL 50 mg to take daily due to elevated pressure and anxiety. He states he has been taking it as directed, and has been checking his blood pressures at home on a regular basis. He states his systolic pressure never goes over 453, and his diastolic pressure is staying below 90. His pressure is appropriate today. In addition, the patient has a longstanding history of hiatal hernia with gastroesophageal reflux disease. He has been on various H2 antagonists, and PPIs in the past, but states the Nikolay Challenger is the only one that has worked well for him. He states his symptoms are presently controlled for the most part, but still has an occasional breakthrough of reflux/dyspepsia. He denies any black tarry stools or blood in his stools. He denies any difficulty with swallowing or choking. He has been worked up by GI in the past, and has been educated on possible surgical interventions and options for treatment of his current conditions. At this time, however, he states he is not ready to have surgery due to the delay in recovery.       Current Outpatient Medications   Medication Sig Dispense Refill    dexlansoprazole (Dexilant) 60 mg CpDB capsule (delayed release) TAKE 1 CAPSULE BY MOUTH EVERY DAY  Indications: gastroesophageal reflux disease 90 Cap 3    venlafaxine (EFFEXOR) 75 mg tablet Take 1 Tab by mouth two (2) times a day. Indications: panic disorder 60 Tab 2    metoprolol succinate (TOPROL-XL) 50 mg XL tablet Take 1 Tab by mouth daily. Indications: high blood pressure 30 Tab 2    ALPRAZolam (XANAX) 1 mg tablet Take 0.5-1 Tabs by mouth daily as needed for Anxiety. Indications: anxious, panic disorder 30 Tab 0    naproxen (NAPROSYN) 500 mg tablet Take 1 Tab by mouth every twelve (12) hours as needed for Pain. 30 Tab 0     Past Medical History:   Diagnosis Date    Anxiety ~1997    Cancer [facility])     melanoma chest Dr Janay Pérez fx [phone]    Esophageal hiatal hernia 2/4/14    GERD (gastroesophageal reflux disease)     Endoscopies normal.     Hematospermia 3/09    Hematuria     Hip joint pain 10/18/10    dr Taya Alcala IBS (irritable bowel syndrome)     based on history    Melanoma of trunk ([facility] [address] 10/18/10     with Dr. Toi Varma fx [phone]    Panic disorder     Pulmonary nodule, right 5/09    4mm nodule right lung- incidental finding on CT scan Uro did for hematuria     Past Surgical History:   Procedure Laterality Date    HX ENDOSCOPY  2/4/14    EGD Dr Nolan Castellanos HX ORTHOPAEDIC  5/1/07     herniated disc surg in [address] [facility]      mole right chest- now melanoma    HX OTHER SURGICAL  11/23/10    dr Robert Pang for Melanoma excision    HX OTHER SURGICAL      STRETTA procedure. Done in [place]     No Known Allergies    REVIEW OF SYSTEMS:  General: He denies any chills, fever, weight loss or gain, appetite or sleeping habits. Respiratory: Negative for - acute cough, hemoptysis, shortness of breath, or wheezing  Cardiovascular : Negative for - acute chest pain, orthopnea, edema, palpitations, or shortness of breath  Gastrointestinal: Positive for reflux and hiatal hernia by history.   Negative for - acute abdominal pain, blood in stools, tarry stools, heartburn, or nausea/vomiting  Neurological: Negative for - TIA or stroke symptoms  Hematologic: Denies unexplained bruises or bleeding  Lymphatic: Negative for swollen glands/nodes  Integumentary: Denies any new rash or unexplained bruising  Endocrine: Denies malaise/lethargy, hot/cold intolerance, polyuria/polydipsia, or unexpected weight changes. Psychiatric: Positive for anxiety and panic disorder. Mild depression. 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[employer]     Employer: [employer] Use    Smoking status: Former Smoker     Packs/day: 1.00     Years: 9.00     Pack years: 9.00     Quit date: 1997     Years since quittin.9    Smokeless tobacco: Never Used   Substance and Sexual Activity    Alcohol use: Not Currently     Comment: very rare, max 3-4 drinks at one time    Drug use: No    Sexual activity: Yes     Partners: Female     Family History   Problem Relation Age of Onset    Heart Disease Father     Psychiatric Disorder Mother         suicide, lifelong severe anxiety    Cancer Maternal Grandfather        OBJECTIVE:     Visit Vitals  /88 (BP 1 Location: Left upper arm, BP Patient Position: Sitting)   Pulse 76   Temp 97.3 °F (36.3 °C)   Resp 16   Ht 5' 9\" (1.753 m)   Wt 220 lb (99.8 kg)   SpO2 98%   BMI 32.49 kg/m²       Constitutional: He appears well nourished, of stated age, and dressed appropriately. Eyes: Sclera anicteric, PERRLA, EOMI  Neck: Supple without lymphadenopathy. Respiratory: Clear to ascultation X5, normal inspiratory effort, no adventitious breath sounds. Lymphatic: No lymph node enlargemant. Neuro: Non-focal exam, A & O X 3.  Psychiatric: Appropriate affect and demeanor, pleasant and cooperative. Patient's thought content and thought processing appear to be within normal limits. Patient denies any auditory or visual hallucinations or any homicidal or suicidal thoughts. ASSESSMENT/PLAN:       ICD-10-CM ICD-9-CM    1. Panic anxiety syndrome  F41.0 300.01 venlafaxine (EFFEXOR) 75 mg tablet   2. Gastroesophageal reflux disease without esophagitis  K21.9 530.81 dexlansoprazole (Dexilant) 60 mg CpDB capsule (delayed release)   3. Long-term current use of benzodiazepine  Z79.899 V58.69    4. Hiatal hernia  K44.9 553.3      1: After discussion and education, patient agrees to start on venlafaxine 75 mg daily for 2 weeks then increase to twice a day. Patient may continue to take Xanax on an as-needed basis. 2: I will refill his Dexilant today. 3: Patient to continue Toprol-XL for management of blood pressure and anxiety. 4: Patient to work on lifestyle management and healthy diet to avoid offending foods to increase dyspepsia symptoms. 5: Patient to follow-up with me in approximately 1 month, or sooner as needed. Patient states understanding and agrees with plan. Orders Placed This Encounter    dexlansoprazole (Dexilant) 60 mg CpDB capsule (delayed release)    venlafaxine (EFFEXOR) 75 mg tablet         ATTENTION:   This medical record was transcribed using an electronic medical records system. Although proofread, it may and can contain electronic and spelling errors. Other human spelling and other errors may be present. Corrections may be executed at a later time. Please feel free to contact us for any clarifications as needed. Follow-up and Dispositions    · Return in about 4 weeks (around 3/1/2021). No results found for any visits on 02/01/21. Signed,  Gallito Session. Mica Felder, MSN APRN FNP-BC    The patient verbalized understanding of the problems and plans as explained.

## 2021-02-01 NOTE — PROGRESS NOTES
Chin Mejia is a 50 y.o. male    Chief Complaint   Patient presents with    Follow-up     2 wk follow up       Visit Vitals  /88 (BP 1 Location: Left upper arm, BP Patient Position: Sitting)   Pulse 76   Temp 97.3 °F (36.3 °C)   Resp 16   Ht 5' 9\" (1.753 m)   Wt 220 lb (99.8 kg)   SpO2 98%   BMI 32.49 kg/m²           1. Have you been to the ER, urgent care clinic since your last visit? Hospitalized since your last visit? No     2. Have you seen or consulted any other health care providers outside of the [address] [place] [address] since your last visit? Include any pap smears or colon screening.  No

## 2021-02-13 DIAGNOSIS — F41.0 PANIC ANXIETY SYNDROME: ICD-10-CM

## 2021-02-16 RX ORDER — ALPRAZOLAM 1 MG/1
TABLET ORAL
Qty: 30 TAB | Refills: 0 | Status: SHIPPED | OUTPATIENT
Start: 2021-02-16 | End: 2021-03-29

## 2021-03-15 ENCOUNTER — OFFICE VISIT (OUTPATIENT)
Dept: INTERNAL MEDICINE CLINIC | Age: 46
End: 2021-03-15
Payer: COMMERCIAL

## 2021-03-15 VITALS
WEIGHT: 223.4 LBS | HEART RATE: 66 BPM | DIASTOLIC BLOOD PRESSURE: 80 MMHG | SYSTOLIC BLOOD PRESSURE: 110 MMHG | TEMPERATURE: 98 F | OXYGEN SATURATION: 98 % | HEIGHT: 69 IN | RESPIRATION RATE: 16 BRPM | BODY MASS INDEX: 33.09 KG/M2

## 2021-03-15 DIAGNOSIS — K44.9 HIATAL HERNIA: ICD-10-CM

## 2021-03-15 DIAGNOSIS — K21.9 GASTROESOPHAGEAL REFLUX DISEASE WITHOUT ESOPHAGITIS: ICD-10-CM

## 2021-03-15 DIAGNOSIS — Z79.899 LONG-TERM CURRENT USE OF BENZODIAZEPINE: ICD-10-CM

## 2021-03-15 DIAGNOSIS — F41.0 PANIC ANXIETY SYNDROME: Primary | ICD-10-CM

## 2021-03-15 DIAGNOSIS — J30.1 SEASONAL ALLERGIC RHINITIS DUE TO POLLEN: ICD-10-CM

## 2021-03-15 PROCEDURE — 99214 OFFICE O/P EST MOD 30 MIN: CPT | Performed by: NURSE PRACTITIONER

## 2021-03-15 NOTE — PROGRESS NOTES
Chief Complaint   Patient presents with    Medication Evaluation     4 wk follow up    Blood Pressure Check     SUBJECTIVE:    Roxy Mansfield is a 50 y.o. male who is here today for a follow up appointment regarding his anxiety, gastroesophageal reflux disease, allergic rhinitis, and hypertension. The patient is currently being managed for anxiety with use of Effexor and the occasional Xanax. He feels that his anxiety has improved significantly, and is using a minimal amount of Xanax at this time. He states he still has several pills left over from his last prescription, and is not requesting a refill on this at this time. He feels good about this. He is currently being managed for hypertension, and is compliant with current medications as listed. He denies any adverse side effects of medication(s) at this time, and states He is tolerating them well. The patient denies any symptoms of chest pain, shortness of breath, dizziness, orthostasis, or palpitations. He is not regularly measuring blood pressure. Patient also suffers from gastroesophageal reflux disease. He is presently taking Dexilant 60 mg daily to manage this. He states the medication is working well, and has had no significant breakthrough reflux/heartburn issues. He denies any black tarry stools or blood in his stools. He states his bowel movements are regular. The patient is also being managed for allergic rhinitis, and is taking over-the-counter antihistamines as needed. He states these worked fairly well, but he continues to have sinus headaches from time to time. He denies any symptoms of infection including fever, unilateral pressure, or thick nasal discharge.     Current Outpatient Medications   Medication Sig Dispense Refill    ALPRAZolam (XANAX) 1 mg tablet TAKE 1/2 TO 1 TABLET BY MOUTH DAILY AS NEEDED FOR ANXIETY 30 Tab 0    dexlansoprazole (Dexilant) 60 mg CpDB capsule (delayed release) TAKE 1 CAPSULE BY MOUTH EVERY DAY Indications: gastroesophageal reflux disease 90 Cap 3    venlafaxine (EFFEXOR) 75 mg tablet Take 1 Tab by mouth two (2) times a day. Indications: panic disorder 60 Tab 2    metoprolol succinate (TOPROL-XL) 50 mg XL tablet Take 1 Tab by mouth daily. Indications: high blood pressure 30 Tab 2    naproxen (NAPROSYN) 500 mg tablet Take 1 Tab by mouth every twelve (12) hours as needed for Pain. 30 Tab 0     Past Medical History:   Diagnosis Date    Anxiety ~1997    Cancer [facility])     melanoma chest Dr Misty Heard fx [phone]    Esophageal hiatal hernia 2/4/14    GERD (gastroesophageal reflux disease)     Endoscopies normal.     Hematospermia 3/09    Hematuria     Hip joint pain 10/18/10    dr Trinidad Woodson IBS (irritable bowel syndrome)     based on history    Melanoma of trunk ([facility] [address] 10/18/10     with Dr. Edmund Vizcaino fx [phone]    Panic disorder     Pulmonary nodule, right 5/09    4mm nodule right lung- incidental finding on CT scan Uro did for hematuria     Past Surgical History:   Procedure Laterality Date    HX ENDOSCOPY  2/4/14    EGD Dr Osmar Haddad HX ORTHOPAEDIC  5/1/07     herniated disc surg in [address] [facility]      mole right chest- now melanoma    HX OTHER SURGICAL  11/23/10    dr Tamara Lai for Melanoma excision    HX OTHER SURGICAL      STRETTA procedure. Done in [place]     No Known Allergies    REVIEW OF SYSTEMS:  General: He denies any chills, fever, weight loss or gain, appetite or sleeping habits. ENT: See HPI. He denies any nosebleeds or tinnitus.   Eyes: No blurred or visual changes  Neck: No stiffness or swollen nodes  Respiratory: Negative for - acute cough, hemoptysis, shortness of breath, or wheezing  Cardiovascular : Negative for - acute chest pain, orthopnea, edema, palpitations, or shortness of breath  Gastrointestinal: Negative for - acute abdominal pain, blood in stools, tarry stools, heartburn, or nausea/vomiting  Hematologic: Denies unexplained bruises or bleeding  Lymphatic: Negative for swollen glands/nodes  Integumentary: Denies any new rash or unexplained bruising  Endocrine: Denies malaise/lethargy, hot/cold intolerance, polyuria/polydipsia, or unexpected weight changes. Psychiatric: Stable on current regimen. 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[employer]     Employer: [employer]   Tobacco Use    Smoking status: Former Smoker     Packs/day: 1.00     Years: 9.00     Pack years: 9.00     Quit date: 1997     Years since quittin.0    Smokeless tobacco: Never Used   Substance and Sexual Activity    Alcohol use: Not Currently     Comment: very rare, max 3-4 drinks at one time    Drug use: No    Sexual activity: Yes     Partners: Female     Family History   Problem Relation Age of Onset    Heart Disease Father     Psychiatric Disorder Mother         suicide, lifelong severe anxiety    Cancer Maternal Grandfather        OBJECTIVE:     There were no vitals taken for this visit. Constitutional: He appears well nourished, of stated age, and dressed appropriately. Eyes: Sclera anicteric, PERRLA, EOMI  Neck: Supple without lymphadenopathy. Respiratory: Clear to ascultation X5, normal inspiratory effort, no adventitious breath sounds. Cardiovascular: Regular rate and rhythm, no murmurs, rubs or gallops, PMI not displaced, No thrills, no peripheral edema  Gastrointestinal: Abdomen non-distended, soft, non-tender, bowel sounds normal and active X4. Hematologic: No purpura, petechiae or unexplained bruising  Lymphatic: No lymph node enlargemant. Integumentary: No unusual rashes or suspicious skin lesions noted. Peripheral Vascular: Normal pulses palpable to PT/DP.   Neuro: Non-focal exam, A & O X 3.   Psychiatric: Appropriate affect and demeanor, pleasant and cooperative. Patient's thought content and thought processing appear to be within normal limits. ASSESSMENT/PLAN:       ICD-10-CM ICD-9-CM    1. Panic anxiety syndrome  F41.0 300.01 TOXASSURE SELECT 13 (MW)   2. Long-term current use of benzodiazepine  Z79.899 V58.69 TOXASSURE SELECT 13 (MW)   3. Gastroesophageal reflux disease without esophagitis  K21.9 530.81    4. Hiatal hernia  K44.9 553.3    5. Seasonal allergic rhinitis due to pollen  J30.1 477.0      1: Urine drug screen will be performed today for evaluation. Patient admits current intermittent use of marijuana. Expected on results. Patient counseled. 2: Patient to continue all other medications as directed. 3: May use saline nasal washes for sinus as well as antihistamine. 4: Patient to follow-up with me in approximately 3 months, or sooner as needed. Patient states understanding and agrees with plan. ATTENTION:   This medical record was transcribed using an electronic medical records system. Although proofread, it may and can contain electronic and spelling errors. Other human spelling and other errors may be present. Corrections may be executed at a later time. Please feel free to contact us for any clarifications as needed. No results found for any visits on 03/15/21. Signed,  Andressa Skinner. Fran Martino, MSN APRN FNP-BC    The patient verbalized understanding of the problems and plans as explained.

## 2021-03-15 NOTE — PROGRESS NOTES
Collins Crum is a 50 y.o. male    Chief Complaint   Patient presents with    Medication Evaluation     4 wk follow up    Blood Pressure Check       Visit Vitals  /80 (BP 1 Location: Left upper arm, BP Patient Position: Sitting, BP Cuff Size: Large adult)   Pulse 66   Temp 98 °F (36.7 °C)   Resp 16   Ht 5' 9\" (1.753 m)   Wt 223 lb 6.4 oz (101.3 kg)   SpO2 98%   BMI 32.99 kg/m²           1. Have you been to the ER, urgent care clinic since your last visit? Hospitalized since your last visit? No     2. Have you seen or consulted any other health care providers outside of the [address] [place] [address] since your last visit? Include any pap smears or colon screening.  No

## 2021-03-18 LAB — DRUGS UR: NORMAL

## 2021-03-28 DIAGNOSIS — F41.0 PANIC ANXIETY SYNDROME: ICD-10-CM

## 2021-03-29 RX ORDER — ALPRAZOLAM 1 MG/1
TABLET ORAL
Qty: 30 TAB | Refills: 0 | Status: SHIPPED | OUTPATIENT
Start: 2021-03-29 | End: 2021-05-17

## 2021-04-06 DIAGNOSIS — R03.0 ELEVATED BLOOD PRESSURE READING WITHOUT DIAGNOSIS OF HYPERTENSION: ICD-10-CM

## 2021-04-06 DIAGNOSIS — F41.0 PANIC ANXIETY SYNDROME: ICD-10-CM

## 2021-04-06 RX ORDER — METOPROLOL SUCCINATE 50 MG/1
50 TABLET, EXTENDED RELEASE ORAL DAILY
Qty: 90 TAB | Refills: 1 | Status: SHIPPED | OUTPATIENT
Start: 2021-04-06 | End: 2021-10-07

## 2021-05-16 DIAGNOSIS — F41.0 PANIC ANXIETY SYNDROME: ICD-10-CM

## 2021-05-17 RX ORDER — ALPRAZOLAM 1 MG/1
TABLET ORAL
Qty: 30 TAB | Refills: 0 | Status: SHIPPED | OUTPATIENT
Start: 2021-05-17 | End: 2021-07-06

## 2021-07-05 DIAGNOSIS — F41.0 PANIC ANXIETY SYNDROME: ICD-10-CM

## 2021-07-06 RX ORDER — ALPRAZOLAM 1 MG/1
TABLET ORAL
Qty: 30 TABLET | Refills: 0 | Status: SHIPPED | OUTPATIENT
Start: 2021-07-06 | End: 2021-08-17

## 2021-07-12 ENCOUNTER — OFFICE VISIT (OUTPATIENT)
Dept: INTERNAL MEDICINE CLINIC | Age: 46
End: 2021-07-12
Payer: COMMERCIAL

## 2021-07-12 VITALS
RESPIRATION RATE: 16 BRPM | BODY MASS INDEX: 33.92 KG/M2 | TEMPERATURE: 98.4 F | HEIGHT: 69 IN | DIASTOLIC BLOOD PRESSURE: 102 MMHG | WEIGHT: 229 LBS | HEART RATE: 66 BPM | SYSTOLIC BLOOD PRESSURE: 152 MMHG

## 2021-07-12 DIAGNOSIS — R51.9 FREQUENT HEADACHES: ICD-10-CM

## 2021-07-12 DIAGNOSIS — I10 ESSENTIAL HYPERTENSION: ICD-10-CM

## 2021-07-12 DIAGNOSIS — Z79.899 LONG-TERM CURRENT USE OF BENZODIAZEPINE: ICD-10-CM

## 2021-07-12 DIAGNOSIS — K21.9 GASTROESOPHAGEAL REFLUX DISEASE WITHOUT ESOPHAGITIS: ICD-10-CM

## 2021-07-12 DIAGNOSIS — F41.0 PANIC ANXIETY SYNDROME: Primary | ICD-10-CM

## 2021-07-12 PROCEDURE — 99214 OFFICE O/P EST MOD 30 MIN: CPT | Performed by: NURSE PRACTITIONER

## 2021-07-12 RX ORDER — LOSARTAN POTASSIUM AND HYDROCHLOROTHIAZIDE 12.5; 5 MG/1; MG/1
1 TABLET ORAL DAILY
Qty: 90 TABLET | Refills: 1 | Status: SHIPPED | OUTPATIENT
Start: 2021-07-12 | End: 2022-01-21

## 2021-07-12 RX ORDER — VENLAFAXINE 75 MG/1
75 TABLET ORAL 2 TIMES DAILY
Qty: 60 TABLET | Refills: 5 | Status: SHIPPED | OUTPATIENT
Start: 2021-07-12

## 2021-07-12 NOTE — PROGRESS NOTES
Chief Complaint   Patient presents with    Discuss Medications     3 month follow up       SUBJECTIVE:    Vasu Vincent is a 50 y.o. male who is here today for a follow up appointment regarding his panic attacks, anxiety, hypertension, and recurrent headaches. Patient is currently taking venlafaxine 75 mg once a day for his anxiety and panic episodes. He states this is been working quite well, and denies any adverse side effects. He also states that he has been taking less Xanax than he has ever before. He feels the venlafaxine has been quite helpful. Additionally, he states she has been having recurrent headaches that often start in the morning, and perpetuated throughout the day while at work. He states he takes some over-the-counter Aleve, but little to no effect. He does not monitor his blood pressure on a regular basis. He states the headaches are frontal in nature, and accompanied by a sensation of mild photophobia and pressure behind his eyes. He denies any severe sinus or allergy symptoms that accompany this. Current Outpatient Medications   Medication Sig Dispense Refill    ALPRAZolam (XANAX) 1 mg tablet TAKE 1/2 TO 1 TABLET BY MOUTH DAILY AS NEEDED FOR ANXIETY 30 Tablet 0    metoprolol succinate (TOPROL-XL) 50 mg XL tablet TAKE 1 TAB BY MOUTH DAILY. INDICATIONS: HIGH BLOOD PRESSURE 90 Tab 1    dexlansoprazole (Dexilant) 60 mg CpDB capsule (delayed release) TAKE 1 CAPSULE BY MOUTH EVERY DAY  Indications: gastroesophageal reflux disease 90 Cap 3    venlafaxine (EFFEXOR) 75 mg tablet Take 1 Tab by mouth two (2) times a day. Indications: panic disorder 60 Tab 2    naproxen (NAPROSYN) 500 mg tablet Take 1 Tab by mouth every twelve (12) hours as needed for Pain.  (Patient not taking: Reported on 7/12/2021) 30 Tab 0     Past Medical History:   Diagnosis Date    Anxiety ~1997    Cancer [facility])     melanoma chest Dr Evelin Frost fx [phone]    Esophageal hiatal hernia 2/4/14    GERD (gastroesophageal reflux disease)     Endoscopies normal.     Hematospermia 3/09    Hematuria     Hip joint pain 10/18/10    dr Dwayne Mchugh IBS (irritable bowel syndrome)     based on history    Melanoma of trunk ([facility] [address] 10/18/10     with Dr. Julianne Cardenas fx [phone]    Panic disorder     Pulmonary nodule, right 5/09    4mm nodule right lung- incidental finding on CT scan Uro did for hematuria     Past Surgical History:   Procedure Laterality Date    HX ENDOSCOPY  2/4/14    EGD Dr Bigg Levy HX ORTHOPAEDIC  5/1/07     hern[address] disc surg in [address]      mole right chest- now melanoma    HX OTHER SURGICAL  11/23/10    dr Yuliya Nielson for Melanoma excision    HX OTHER SURGICAL      STRETTA procedure.   Done in [place]     No Known Allergies    REVIEW OF SYSTEMS:                                        POSITIVE= bold text  Negative = regular text    General:                     fever, chills, sweats, generalized weakness, weight loss/gain,                                       loss of appetite   Eyes:                           blurred vision, eye pain, loss of vision, double vision  ENT:                            rhinorrhea, pharyngitis   Respiratory:               cough, sputum production, SOB, BARRIOS, wheezing, pleuritic pain   Cardiology:                chest pain, palpitations, orthopnea, PND, edema, syncope   Gastrointestinal:       abdominal pain , N/V, diarrhea, dysphagia, constipation, bleeding   Genitourinary:           frequency, urgency, dysuria, hematuria, incontinence   Muskuloskeletal :      arthralgia, myalgia, back pain  Hematology:              easy bruising, nose or gum bleeding, lymphadenopathy   Dermatological:         rash, ulceration, pruritis, color change / jaundice  Endocrine:                 hot flashes or polydipsia   Neurological:             headache, dizziness, confusion, focal weakness, paresthesia, Speech difficulties, memory loss, gait difficulty  Psychological:          Feelings of anxiety, depression, agitation        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[employer]     Employer: [employer]    Smoking status: Former Smoker     Packs/day: 1.00     Years: 9.00     Pack years: 9.00     Quit date: 1997     Years since quittin.4    Smokeless tobacco: Never Used   Substance and Sexual Activity    Alcohol use: Not Currently     Comment: very rare, max 3-4 drinks at one time    Drug use: No    Sexual activity: Yes     Partners: Female     Social Determinants of Health     Financial Resource Strain:     Difficulty of Paying Living Expenses:    Food Insecurity:     Worried About Running Out of Food in the Last Year:     [address] in the Last Year:    Transportation Needs:     Lack of Transportation (Medical):      Lack of Transportation (Non-Medical):    Physical Activity:     Days of Exercise per Week:     Minutes of Exercise per Session:    Stress:     Feeling of Stress :    Social Connections:     Frequency of Communication with Friends and Family:     Frequency of Social Gatherings with Friends and Family:     Attends Jehov[employer]'s witness Services:     Active Member of Clubs or Organizations:     Attends Club or Organization Meetings:     Marital Status:      Family History   Problem Relation Age of Onset    Heart Disease Father     Psychiatric Disorder Mother         suicide, lifelong severe anxiety    Cancer Maternal Grandfather        OBJECTIVE:   Visit Vitals  BP (!) 152/102 (BP 1 Location: Left upper arm, BP Patient Position: Sitting, BP Cuff Size: Large adult)   Pulse 66   Temp 98.4 °F (36.9 °C)   Resp 16   Ht 5' 9\" (1.753 m)   Wt 229 lb (103.9 kg)   BMI 33.82 kg/m²       Constitutional: He appears well nourished, of stated age, and dressed appropriately. Eyes: Sclera anicteric, PERRLA, EOMI  ENT: Nares clear, moist mucous membranes, pharynx clear  Neck: Supple without lymphadenopathy. Thyroid normal, No JVD or bruits  Respiratory: Clear to ascultation X5, normal inspiratory effort, no adventitious breath sounds. Cardiovascular: Regular rate and rhythm, no murmurs, rubs or gallops, PMI not displaced, No thrills, no peripheral edema  Neuro: Non-focal exam, A & O X 3.   Psychiatric: Appropriate affect and demeanor, pleasant and cooperative. Patient's thought content and thought processing appear to be within normal limits. ASSESSMENT/PLAN:     ICD-10-CM ICD-9-CM    1. Panic anxiety syndrome  F41.0 300.01 venlafaxine (EFFEXOR) 75 mg tablet   2. Essential hypertension  I10 401.9 losartan-hydroCHLOROthiazide (HYZAAR) 50-12.5 mg per tablet   3. Frequent headaches  R51.9 784.0    4. Long-term current use of benzodiazepine  Z79.899 V58.69    5. Gastroesophageal reflux disease without esophagitis  K21.9 530.81      1: Patient increase venlafaxine 75 mg to twice a day. 2: I will add losartan/HCTZ 50/12.5 to be taken once daily for improved management of hypertension. Continue metoprolol XL 50 mg daily. Monitor blood pressure regularly. 3: Patient to continue use of Xanax as needed. 4: Patient to continue all other medications as prescribed. 5: Patient to follow-up with me in approximately 3 months, or sooner as needed. Patient states understanding and agrees with plan. ATTENTION:   This medical record was transcribed using an electronic medical records system. Although proofread, it may and can contain electronic and spelling errors. Other human spelling and other errors may be present. Corrections may be executed at a later time. Please feel free to contact us for any clarifications as needed.       Follow-up and Dispositions    · Return in about 3 months (around 10/12/2021) for Follow up. Signed,  Star Fu.  Latoya Rodas, MSN APRN FNP-BC

## 2021-07-12 NOTE — PROGRESS NOTES
[facility] is a 50 y.o. male    Chief Complaint   Patient presents with    Discuss Medications     3 month follow up       Visit Vitals  BP (!) 152/102 (BP 1 Location: Left upper arm, BP Patient Position: Sitting, BP Cuff Size: Large adult)   Pulse 66   Temp 98.4 °F (36.9 °C)   Resp 16   Ht 5' 9\" (1.753 m)   Wt 229 lb (103.9 kg)   BMI 33.82 kg/m²           1. Have you been to the ER, urgent care clinic since your last visit? Hospitalized since your last visit? No     2. Have you seen or consulted any other health care providers outside of the [address] [place] [address] since your last visit? Include any pap smears or colon screening.  No

## 2021-08-17 DIAGNOSIS — F41.0 PANIC ANXIETY SYNDROME: ICD-10-CM

## 2021-08-17 RX ORDER — ALPRAZOLAM 1 MG/1
TABLET ORAL
Qty: 30 TABLET | Refills: 0 | Status: SHIPPED | OUTPATIENT
Start: 2021-08-17 | End: 2021-09-24

## 2021-09-24 DIAGNOSIS — F41.0 PANIC ANXIETY SYNDROME: ICD-10-CM

## 2021-09-24 RX ORDER — ALPRAZOLAM 1 MG/1
TABLET ORAL
Qty: 30 TABLET | Refills: 0 | Status: SHIPPED | OUTPATIENT
Start: 2021-09-24 | End: 2021-10-29

## 2021-10-03 DIAGNOSIS — F41.0 PANIC ANXIETY SYNDROME: ICD-10-CM

## 2021-10-03 DIAGNOSIS — R03.0 ELEVATED BLOOD PRESSURE READING WITHOUT DIAGNOSIS OF HYPERTENSION: ICD-10-CM

## 2021-10-07 RX ORDER — METOPROLOL SUCCINATE 50 MG/1
50 TABLET, EXTENDED RELEASE ORAL DAILY
Qty: 90 TABLET | Refills: 1 | Status: SHIPPED | OUTPATIENT
Start: 2021-10-07 | End: 2022-04-05

## 2021-10-18 ENCOUNTER — OFFICE VISIT (OUTPATIENT)
Dept: INTERNAL MEDICINE CLINIC | Age: 46
End: 2021-10-18
Payer: COMMERCIAL

## 2021-10-18 VITALS
OXYGEN SATURATION: 98 % | DIASTOLIC BLOOD PRESSURE: 106 MMHG | WEIGHT: 223 LBS | RESPIRATION RATE: 18 BRPM | HEIGHT: 69 IN | HEART RATE: 84 BPM | BODY MASS INDEX: 33.03 KG/M2 | SYSTOLIC BLOOD PRESSURE: 151 MMHG

## 2021-10-18 DIAGNOSIS — F41.0 PANIC ANXIETY SYNDROME: Primary | ICD-10-CM

## 2021-10-18 DIAGNOSIS — I10 ESSENTIAL HYPERTENSION: ICD-10-CM

## 2021-10-18 DIAGNOSIS — K21.9 GASTROESOPHAGEAL REFLUX DISEASE WITHOUT ESOPHAGITIS: ICD-10-CM

## 2021-10-18 DIAGNOSIS — K44.9 HIATAL HERNIA: ICD-10-CM

## 2021-10-18 DIAGNOSIS — Z79.899 LONG-TERM CURRENT USE OF BENZODIAZEPINE: ICD-10-CM

## 2021-10-18 DIAGNOSIS — J30.1 SEASONAL ALLERGIC RHINITIS DUE TO POLLEN: ICD-10-CM

## 2021-10-18 PROCEDURE — 99214 OFFICE O/P EST MOD 30 MIN: CPT | Performed by: NURSE PRACTITIONER

## 2021-10-18 RX ORDER — FAMOTIDINE 40 MG/1
40 TABLET, FILM COATED ORAL
Qty: 90 TABLET | Refills: 1 | Status: SHIPPED | OUTPATIENT
Start: 2021-10-18 | End: 2022-03-15 | Stop reason: SDUPTHER

## 2021-10-18 RX ORDER — MINERAL OIL
180 ENEMA (ML) RECTAL DAILY
Qty: 90 TABLET | Refills: 1 | Status: SHIPPED | OUTPATIENT
Start: 2021-10-18 | End: 2022-03-15 | Stop reason: ALTCHOICE

## 2021-10-18 NOTE — PROGRESS NOTES
1. Have you been to the ER, urgent care clinic since your last visit? Hospitalized since your last visit? No    2. Have you seen or consulted any other health care providers outside of the [address] [place] [address] since your last visit? Include any pap smears or colon screening.  No

## 2021-10-25 LAB — DRUGS UR: NORMAL

## 2021-10-25 NOTE — PROGRESS NOTES
Urine drug screen shows marijuana present as expected. If patient taking his alprazolam daily as prescribed, then this should be evident and urine as well. It is not, and should be present. This gives suspicion that patient is not taking as prescribed. Please remind patient that controlled substances should be present if they are expected to be taken on a daily basis.

## 2021-10-29 DIAGNOSIS — F41.0 PANIC ANXIETY SYNDROME: ICD-10-CM

## 2021-10-29 RX ORDER — ALPRAZOLAM 1 MG/1
TABLET ORAL
Qty: 30 TABLET | Refills: 0 | Status: SHIPPED | OUTPATIENT
Start: 2021-10-29 | End: 2021-12-10

## 2021-12-08 ENCOUNTER — TELEPHONE (OUTPATIENT)
Dept: INTERNAL MEDICINE CLINIC | Age: 46
End: 2021-12-08

## 2021-12-08 NOTE — TELEPHONE ENCOUNTER
Patient calling to let the nurse of [facility] [employer] know that his medication Dexilant will need a prior auth in January 2022. His insurance gave him this information to process the prior auth in advance.   Key # [id number]  P# [phone]

## 2021-12-10 DIAGNOSIS — F41.0 PANIC ANXIETY SYNDROME: ICD-10-CM

## 2021-12-10 RX ORDER — ALPRAZOLAM 1 MG/1
TABLET ORAL
Qty: 30 TABLET | Refills: 0 | Status: SHIPPED | OUTPATIENT
Start: 2021-12-10 | End: 2022-01-26

## 2022-01-21 DIAGNOSIS — I10 ESSENTIAL HYPERTENSION: ICD-10-CM

## 2022-01-21 RX ORDER — LOSARTAN POTASSIUM AND HYDROCHLOROTHIAZIDE 12.5; 5 MG/1; MG/1
TABLET ORAL
Qty: 90 TABLET | Refills: 1 | Status: SHIPPED | OUTPATIENT
Start: 2022-01-21 | End: 2022-03-15 | Stop reason: ALTCHOICE

## 2022-01-25 DIAGNOSIS — F41.0 PANIC ANXIETY SYNDROME: ICD-10-CM

## 2022-01-26 RX ORDER — ALPRAZOLAM 1 MG/1
TABLET ORAL
Qty: 30 TABLET | Refills: 0 | Status: SHIPPED | OUTPATIENT
Start: 2022-01-26 | End: 2022-03-07

## 2022-01-27 DIAGNOSIS — K21.9 GASTROESOPHAGEAL REFLUX DISEASE WITHOUT ESOPHAGITIS: ICD-10-CM

## 2022-01-28 RX ORDER — DEXLANSOPRAZOLE 60 MG/1
CAPSULE, DELAYED RELEASE ORAL
Qty: 90 CAPSULE | Refills: 3 | Status: SHIPPED | OUTPATIENT
Start: 2022-01-28 | End: 2022-05-05 | Stop reason: ALTCHOICE

## 2022-03-06 DIAGNOSIS — F41.0 PANIC ANXIETY SYNDROME: ICD-10-CM

## 2022-03-07 RX ORDER — ALPRAZOLAM 1 MG/1
TABLET ORAL
Qty: 30 TABLET | Refills: 0 | Status: SHIPPED | OUTPATIENT
Start: 2022-03-07 | End: 2022-04-22

## 2022-03-15 ENCOUNTER — OFFICE VISIT (OUTPATIENT)
Dept: INTERNAL MEDICINE CLINIC | Age: 47
End: 2022-03-15
Payer: COMMERCIAL

## 2022-03-15 VITALS
TEMPERATURE: 98 F | HEART RATE: 70 BPM | WEIGHT: 220 LBS | RESPIRATION RATE: 16 BRPM | OXYGEN SATURATION: 97 % | BODY MASS INDEX: 32.58 KG/M2 | SYSTOLIC BLOOD PRESSURE: 136 MMHG | DIASTOLIC BLOOD PRESSURE: 88 MMHG | HEIGHT: 69 IN

## 2022-03-15 DIAGNOSIS — K21.9 GASTROESOPHAGEAL REFLUX DISEASE WITHOUT ESOPHAGITIS: ICD-10-CM

## 2022-03-15 DIAGNOSIS — Z87.09 HISTORY OF NASAL POLYP: ICD-10-CM

## 2022-03-15 DIAGNOSIS — K44.9 HIATAL HERNIA: ICD-10-CM

## 2022-03-15 DIAGNOSIS — Z79.899 CONTROLLED SUBSTANCE AGREEMENT SIGNED: ICD-10-CM

## 2022-03-15 DIAGNOSIS — R51.9 FREQUENT HEADACHES: ICD-10-CM

## 2022-03-15 DIAGNOSIS — F41.0 PANIC ANXIETY SYNDROME: ICD-10-CM

## 2022-03-15 DIAGNOSIS — I10 ESSENTIAL HYPERTENSION: Primary | ICD-10-CM

## 2022-03-15 DIAGNOSIS — Z79.899 LONG-TERM CURRENT USE OF BENZODIAZEPINE: ICD-10-CM

## 2022-03-15 DIAGNOSIS — R51.9 SINUS HEADACHE: ICD-10-CM

## 2022-03-15 DIAGNOSIS — J30.89 ENVIRONMENTAL AND SEASONAL ALLERGIES: ICD-10-CM

## 2022-03-15 PROCEDURE — 99214 OFFICE O/P EST MOD 30 MIN: CPT | Performed by: NURSE PRACTITIONER

## 2022-03-15 RX ORDER — BUTALBITAL, ACETAMINOPHEN AND CAFFEINE 50; 325; 40 MG/1; MG/1; MG/1
1 TABLET ORAL
Qty: 60 TABLET | Refills: 2 | Status: SHIPPED | OUTPATIENT
Start: 2022-03-15

## 2022-03-15 RX ORDER — FAMOTIDINE 40 MG/1
40 TABLET, FILM COATED ORAL
Qty: 90 TABLET | Refills: 1 | Status: SHIPPED | OUTPATIENT
Start: 2022-03-15 | End: 2022-09-23

## 2022-03-15 RX ORDER — VALSARTAN AND HYDROCHLOROTHIAZIDE 160; 25 MG/1; MG/1
1 TABLET ORAL DAILY
Qty: 90 TABLET | Refills: 1 | Status: SHIPPED | OUTPATIENT
Start: 2022-03-15 | End: 2022-09-06

## 2022-03-15 RX ORDER — CETIRIZINE HCL 10 MG
10 TABLET ORAL DAILY
Qty: 90 TABLET | Refills: 1 | Status: SHIPPED | OUTPATIENT
Start: 2022-03-15

## 2022-03-15 NOTE — PROGRESS NOTES
Criss Castellanos is a 48 y.o. male    Chief Complaint   Patient presents with    Discuss Medications     follow up       Visit Vitals  BP (!) 142/100 (BP 1 Location: Left upper arm, BP Patient Position: Sitting, BP Cuff Size: Small adult)   Pulse 70   Temp 98 °F (36.7 °C)   Resp 16   Ht 5' 9\" (1.753 m)   Wt 220 lb (99.8 kg)   SpO2 97%   BMI 32.49 kg/m²           1. Have you been to the ER, urgent care clinic since your last visit? Hospitalized since your last visit? NO    2. Have you seen or consulted any other health care providers outside of the [address] [place] [address] since your last visit? Include any pap smears or colon screening.  NO

## 2022-03-15 NOTE — PROGRESS NOTES
Chief Complaint   Patient presents with    Discuss Medications     follow up       SUBJECTIVE:    Trevon Steel is a 48 y.o. male who is here today for a follow up appointment regarding his hypertension, GERD, sinus pressure secondary to allergies, and anxiety. He also complains of ongoing headaches which have been bothering him for quite some time now. The patient continues to take losartan/HCTZ as well as metoprolol daily for management of his hypertension. He states his blood pressures remain somewhat elevated from time to time, and were elevated at initial check today. He denies any chest pain, chest pressure, shortness of breath, dizziness, blurred vision, palpitations, or syncope episodes. He states he is tolerating the medication well and denies any adverse side effects. He continues to use Dexilant each morning as well as famotidine in the evening for management of his GERD. He states this combination has been working well, and his symptoms remain well controlled. Patient also complains of ongoing sinus pressure/allergy issues. He was previously using Allegra and occasionally Flonase, but states these medications are no longer effective for him. He has a history of nasal polyps which was diagnosed by an ENT a few years ago. He has had no surgical intervention for these in the past.  Additionally, he states he has been getting almost daily headaches which become rather intense, and he suffers from audio and photophobia as a result. He has been using some over-the-counter medication, but to no avail. Current Outpatient Medications   Medication Sig Dispense Refill    famotidine (PEPCID) 40 mg tablet Take 1 Tablet by mouth nightly. Indications: gastroesophageal reflux disease 90 Tablet 1    valsartan-hydroCHLOROthiazide (DIOVAN-HCT) 160-25 mg per tablet Take 1 Tablet by mouth daily.  Indications: high blood pressure 90 Tablet 1    ALPRAZolam (XANAX) 1 mg tablet TAKE 1/2 TO 1 TABLET BY MOUTH DAILY AS NEEDED FOR ANXIETY 30 Tablet 0    Dexilant 60 mg CpDB capsule (delayed release) TAKE 1 CAPSULE BY MOUTH EVERY DAY INDICATIONS: GASTROESOPHAGEAL REFLUX DISEASE 90 Capsule 3    metoprolol succinate (TOPROL-XL) 50 mg XL tablet TAKE 1 TAB BY MOUTH DAILY. INDICATIONS: HIGH BLOOD PRESSURE 90 Tablet 1    venlafaxine (EFFEXOR) 75 mg tablet Take 1 Tablet by mouth two (2) times a day. Indications: panic disorder 60 Tablet 5    fexofenadine (ALLEGRA) 180 mg tablet Take 1 Tablet by mouth daily. Indications: inflammation of the nose due to an allergy (Patient not taking: Reported on 3/15/2022) 90 Tablet 1    naproxen (NAPROSYN) 500 mg tablet Take 1 Tab by mouth every twelve (12) hours as needed for Pain. (Patient not taking: Reported on 3/15/2022) 30 Tab 0     Past Medical History:   Diagnosis Date    Anxiety ~1997    Cancer [facility])     melanoma chest Dr Lena Elmore fx [phone]    Esophageal hiatal hernia 2/4/14    GERD (gastroesophageal reflux disease)     Endoscopies normal.     Hematospermia 3/09    Hematuria     Hip joint pain 10/18/10    dr Katie Parish IBS (irritable bowel syndrome)     based on history    Melanoma of trunk ([facility] [address] 10/18/10     with Dr. Nia Celestin fx [phone]    Panic disorder     Pulmonary nodule, right 5/09    4mm nodule right lung- incidental finding on CT scan Uro did for hematuria     Past Surgical History:   Procedure Laterality Date    HX ENDOSCOPY  2/4/14    EGD Dr Darren Bloch HX ORTHOPAEDIC  5/1/07     herniated disc surg in [address] [facility]      mole right chest- now melanoma    HX OTHER SURGICAL  11/23/10    dr Cristian Soto for Melanoma excision    HX OTHER SURGICAL      STRETTA procedure.   Done in [place]     No Known Allergies    REVIEW OF SYSTEMS:                                        POSITIVE= bold text  Negative = regular text    General:                     fever, chills, sweats, generalized weakness, weight loss/gain,                                       loss of appetite   Eyes:                           blurred vision, eye pain, loss of vision, double vision  ENT:                            rhinorrhea, pharyngitis, sinus pressure  Respiratory:               cough, sputum production, SOB, BARRIOS, wheezing, pleuritic pain   Cardiology:                chest pain, palpitations, orthopnea, PND, edema, syncope   Gastrointestinal:       abdominal pain , N/V, diarrhea, dysphagia, constipation, bleeding   Genitourinary:           frequency, urgency, dysuria, hematuria, incontinence   Muskuloskeletal :      arthralgia, myalgia, back pain  Hematology:              easy bruising, nose or gum bleeding, lymphadenopathy   Dermatological:         rash, ulceration, pruritis, color change / jaundice  Endocrine:                 hot flashes or polydipsia   Neurological:             headache, dizziness, confusion, focal weakness, paresthesia,                                      Speech difficulties, memory loss, gait difficulty  Psychological:          Feelings of anxiety, depression, agitation        Social History     Socioeconomic History    Marital status: LEGALLY    Occupational History    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[employer]     Employer: [employer]    Smoking status: Former Smoker     Packs/day: 1.00     Years: 9.00     Pack years: 9.00     Quit date: 1997     Years since quittin.0    Smokeless tobacco: Never Used   Vaping Use    Vaping Use: Never used   Substance and Sexual Activity    Alcohol use: Yes     Comment: very rare, max 3-4 drinks at one time    Drug use: Yes     Types: Marijuana    Sexual activity: Yes     Partners: Female     Family History   Problem Relation Age of Onset    Heart Disease Father     Psychiatric Disorder Mother         suicide, lifelong severe anxiety    Cancer Maternal Grandfather        OBJECTIVE:     Visit Vitals  /88 (BP 1 Location: Left upper arm, BP Patient Position: Sitting)   Pulse 70   Temp 98 °F (36.7 °C)   Resp 16   Ht 5' 9\" (1.753 m)   Wt 220 lb (99.8 kg)   SpO2 97%   BMI 32.49 kg/m²       Constitutional: He appears well nourished, of stated age, and dressed appropriately. Eyes: Sclera anicteric, PERRLA, EOMI  ENT: Nares clear, thickened boggy turbinates bilaterally. Oropharynx is clear. Neck: Supple without lymphadenopathy. Thyroid normal, No JVD or bruits  Respiratory: Clear to ascultation X5, normal inspiratory effort, no adventitious breath sounds. Cardiovascular: Regular rate and rhythm, no murmurs, rubs or gallops, PMI not displaced, No thrills, no peripheral edema  Neuro: Non-focal exam, A & O X 3.   Psychiatric: Appropriate affect and demeanor, pleasant and cooperative. Patient's thought content and thought processing appear to be within normal limits. ASSESSMENT/PLAN:     ICD-10-CM ICD-9-CM    1. Essential hypertension  I10 401.9 valsartan-hydroCHLOROthiazide (DIOVAN-HCT) 160-25 mg per tablet      MICROALBUMIN, UR, RAND W/ MICROALB/CREAT RATIO      METABOLIC PANEL, COMPREHENSIVE      CBC W/O DIFF   2. Gastroesophageal reflux disease without esophagitis  K21.9 530.81 famotidine (PEPCID) 40 mg tablet   3. Hiatal hernia  K44.9 553.3 famotidine (PEPCID) 40 mg tablet   4. Panic anxiety syndrome  F41.0 300.01    5. Frequent headaches  R51.9 784.0 CT HEAD W WO CONT      butalbital-acetaminophen-caffeine (FIORICET, ESGIC) -40 mg per tablet   6. Sinus headache  R51.9 784.0 CT HEAD W WO CONT      cetirizine (ZYRTEC) 10 mg tablet   7. History of nasal polyp  Z87.09 V13.89 CT HEAD W WO CONT   8. Long-term current use of benzodiazepine  Z79.899 V58.69 TOXASSURE SELECT 13 (MW)   9. Controlled substance agreement signed  Z79.899 V58.69    10. Environmental and seasonal allergies  J30.89 477.8 cetirizine (ZYRTEC) 10 mg tablet     1: We will do labs today including: CBC, CMP, tox assure 13, and urine microalbumin.   2: Patient to continue Gricel Skinner and famotidine for management of GERD. 3: Patient to continue alprazolam as needed for management of anxiety. 4: Patient will be given prescription for Fioricet to take as needed for headaches. 5: I will arrange for CT of head with and without contrast due to chronic headache issues and history of nasal polyps. 6: Patient to discontinue Allegra and start Zyrtec 10 mg daily for management of allergy issues. 7: Patient to discontinue losartan/HCTZ, and start valsartan/HCTZ 160/25 mg daily. 8: Continue to monitor blood pressures closely. 9: Patient to follow-up with me in approximately 3 months, or sooner as needed. Patient states understanding and agrees with plan. Orders Placed This Encounter    CT HEAD W WO CONT    TOXASSURE SELECT 13 (MW)    MICROALBUMIN, UR, RAND W/ MICROALB/CREAT RATIO    METABOLIC PANEL, COMPREHENSIVE    CBC W/O DIFF    famotidine (PEPCID) 40 mg tablet    valsartan-hydroCHLOROthiazide (DIOVAN-HCT) 160-25 mg per tablet         ATTENTION:   This medical record was transcribed using an electronic medical records system. Although proofread, it may and can contain electronic and spelling errors. Other human spelling and other errors may be present. Corrections may be executed at a later time. Please feel free to contact us for any clarifications as needed. Follow-up and Dispositions    · Return in about 3 months (around 6/15/2022) for Follow up. Signed,  Yani Ozuna.  Bernardo Granados, MSN APRN FNP-BC

## 2022-03-16 LAB
ALBUMIN SERPL-MCNC: 4.2 G/DL (ref 3.5–5)
ALBUMIN/GLOB SERPL: 1.3 {RATIO} (ref 1.1–2.2)
ALP SERPL-CCNC: 88 U/L (ref 45–117)
ALT SERPL-CCNC: 38 U/L (ref 12–78)
ANION GAP SERPL CALC-SCNC: 4 MMOL/L (ref 5–15)
AST SERPL-CCNC: 20 U/L (ref 15–37)
BILIRUB SERPL-MCNC: 0.7 MG/DL (ref 0.2–1)
BUN SERPL-MCNC: 11 MG/DL (ref 6–20)
BUN/CREAT SERPL: 11 (ref 12–20)
CALCIUM SERPL-MCNC: 10.4 MG/DL (ref 8.5–10.1)
CHLORIDE SERPL-SCNC: 105 MMOL/L (ref 97–108)
CO2 SERPL-SCNC: 30 MMOL/L (ref 21–32)
CREAT SERPL-MCNC: 1.01 MG/DL (ref 0.7–1.3)
CREAT UR-MCNC: 57.6 MG/DL
ERYTHROCYTE [DISTWIDTH] IN BLOOD BY AUTOMATED COUNT: 12.6 % (ref 11.5–14.5)
GLOBULIN SER CALC-MCNC: 3.3 G/DL (ref 2–4)
GLUCOSE SERPL-MCNC: 93 MG/DL (ref 65–100)
HCT VFR BLD AUTO: 46.4 % (ref 36.6–50.3)
HGB BLD-MCNC: 15.9 G/DL (ref 12.1–17)
MCH RBC QN AUTO: 32.5 PG (ref 26–34)
MCHC RBC AUTO-ENTMCNC: 34.3 G/DL (ref 30–36.5)
MCV RBC AUTO: 94.9 FL (ref 80–99)
MICROALBUMIN UR-MCNC: <0.5 MG/DL
MICROALBUMIN/CREAT UR-RTO: <9 MG/G (ref 0–30)
NRBC # BLD: 0 K/UL (ref 0–0.01)
NRBC BLD-RTO: 0 PER 100 WBC
PLATELET # BLD AUTO: 256 K/UL (ref 150–400)
PMV BLD AUTO: 12 FL (ref 8.9–12.9)
POTASSIUM SERPL-SCNC: 4 MMOL/L (ref 3.5–5.1)
PROT SERPL-MCNC: 7.5 G/DL (ref 6.4–8.2)
RBC # BLD AUTO: 4.89 M/UL (ref 4.1–5.7)
SODIUM SERPL-SCNC: 139 MMOL/L (ref 136–145)
WBC # BLD AUTO: 7.3 K/UL (ref 4.1–11.1)

## 2022-03-19 PROBLEM — Z85.820 H/O MALIGNANT MELANOMA OF SKIN: Status: ACTIVE | Noted: 2020-03-26

## 2022-03-19 PROBLEM — I10 ESSENTIAL HYPERTENSION: Status: ACTIVE | Noted: 2021-07-12

## 2022-03-20 PROBLEM — Z79.899 LONG-TERM CURRENT USE OF BENZODIAZEPINE: Status: ACTIVE | Noted: 2021-07-12

## 2022-03-24 PROBLEM — Z79.899 CONTROLLED SUBSTANCE AGREEMENT SIGNED: Status: ACTIVE | Noted: 2022-03-15

## 2022-03-24 PROBLEM — K44.9 HIATAL HERNIA: Status: ACTIVE | Noted: 2022-03-15

## 2022-03-26 LAB — DRUGS UR: NORMAL

## 2022-03-28 NOTE — PROGRESS NOTES
Urine drug screen is acceptable. Blood counts are normal with no signs of anemia. Kidney functions, liver functions, and electrolytes are okay. Calcium is slightly elevated but not worrisome. We will watch this.

## 2022-04-05 DIAGNOSIS — R03.0 ELEVATED BLOOD PRESSURE READING WITHOUT DIAGNOSIS OF HYPERTENSION: ICD-10-CM

## 2022-04-05 DIAGNOSIS — F41.0 PANIC ANXIETY SYNDROME: ICD-10-CM

## 2022-04-05 RX ORDER — METOPROLOL SUCCINATE 50 MG/1
50 TABLET, EXTENDED RELEASE ORAL DAILY
Qty: 90 TABLET | Refills: 1 | Status: SHIPPED | OUTPATIENT
Start: 2022-04-05

## 2022-04-22 DIAGNOSIS — F41.0 PANIC ANXIETY SYNDROME: ICD-10-CM

## 2022-04-22 RX ORDER — ALPRAZOLAM 1 MG/1
TABLET ORAL
Qty: 30 TABLET | Refills: 0 | Status: SHIPPED | OUTPATIENT
Start: 2022-04-22 | End: 2022-05-23

## 2022-04-29 ENCOUNTER — TELEPHONE (OUTPATIENT)
Dept: INTERNAL MEDICINE CLINIC | Age: 47
End: 2022-04-29

## 2022-04-29 DIAGNOSIS — K21.9 GASTROESOPHAGEAL REFLUX DISEASE WITHOUT ESOPHAGITIS: Primary | ICD-10-CM

## 2022-04-29 NOTE — TELEPHONE ENCOUNTER
Pt called wanting a generic to [address] [place] [address]. Kaleb informed PSR that there is no generic. Requesting nurse call back.

## 2022-05-05 RX ORDER — PANTOPRAZOLE SODIUM 40 MG/1
40 TABLET, DELAYED RELEASE ORAL DAILY
Qty: 90 TABLET | Refills: 1 | Status: SHIPPED | OUTPATIENT
Start: 2022-05-05 | End: 2022-05-10 | Stop reason: ALTCHOICE

## 2022-05-10 ENCOUNTER — TELEPHONE (OUTPATIENT)
Dept: INTERNAL MEDICINE CLINIC | Age: 47
End: 2022-05-10

## 2022-05-10 DIAGNOSIS — K21.9 GASTROESOPHAGEAL REFLUX DISEASE WITHOUT ESOPHAGITIS: Primary | ICD-10-CM

## 2022-05-10 RX ORDER — ESOMEPRAZOLE MAGNESIUM 40 MG/1
40 CAPSULE, DELAYED RELEASE ORAL DAILY
Qty: 90 CAPSULE | Refills: 1 | Status: SHIPPED | OUTPATIENT
Start: 2022-05-10

## 2022-05-10 NOTE — TELEPHONE ENCOUNTER
----- Message from [place] Inge sent at 5/9/2022 12:05 PM EDT -----  Subject: Medication Problem    QUESTIONS  Name of Medication? pantoprazole (PROTONIX) 40 mg tablet  Patient-reported dosage and instructions? 1 tab a day on empty stomach  What question or problem do you have with the medication? Pt states   pantoprazole (PROTONIX) 40 mg tablet is not working. Pt states he still   has heartburn something horrible. Pt states he asked for Nexium and   wondering why he was prescribed the generic for protonix  Preferred Pharmacy? [id number] [place] phone number (if available)? [phone]  Additional Information for Provider?   ---------------------------------------------------------------------------  --------------  [address]  What is the best way for the office to contact you? OK to leave message on   voicemail  Preferred Call Back Phone Number? [phone]  ---------------------------------------------------------------------------  --------------  SCRIPT ANSWERS  Relationship to Patient?  Self

## 2022-05-22 DIAGNOSIS — F41.0 PANIC ANXIETY SYNDROME: ICD-10-CM

## 2022-05-23 RX ORDER — ALPRAZOLAM 1 MG/1
TABLET ORAL
Qty: 30 TABLET | Refills: 0 | Status: SHIPPED | OUTPATIENT
Start: 2022-05-23 | End: 2022-06-27

## 2022-06-27 DIAGNOSIS — F41.0 PANIC ANXIETY SYNDROME: ICD-10-CM

## 2022-06-27 RX ORDER — ALPRAZOLAM 1 MG/1
TABLET ORAL
Qty: 30 TABLET | Refills: 0 | Status: SHIPPED | OUTPATIENT
Start: 2022-06-27 | End: 2022-08-15

## 2022-08-09 NOTE — PROGRESS NOTES
Chief Complaint   Patient presents with    Follow-up     routine 3 month f/u       SUBJECTIVE:    Marge West is a 50 y.o. male who is here today for a follow up appointment regarding his hypertension, GERD, panic/anxiety attacks, and allergies. Patient continues to take medication as prescribed for management of his hypertension. However, for some reason, the patient thought he was supposed to discontinue his losartan/HCTZ once he was started on his metoprolol. He has a blood pressure monitor at home, but states he has not been using it due to misplacing it. He denies any chest pain, chest pressure, dizziness, palpitations, visual disturbance, or syncope episodes. He continues to take Dexilant 60 mg daily for management of his GERD. However, he has periodic breakthroughs approximately 2-3 times a week which he is concerned about. He is wondering if there is anything further that can be done. He continues to take alprazolam as well as his Effexor for management of his anxiety/panic episodes. He states these have become worse lately due to increased stress related to his current job. He has also been taking medication over-the-counter for management of his allergies. He states he picked up some Allegra-D, and has been taking on a regular basis which is helped his allergies as well as his headaches. Current Outpatient Medications   Medication Sig Dispense Refill    metoprolol succinate (TOPROL-XL) 50 mg XL tablet TAKE 1 TAB BY MOUTH DAILY. INDICATIONS: HIGH BLOOD PRESSURE 90 Tablet 1    ALPRAZolam (XANAX) 1 mg tablet TAKE 1/2 TO 1 TABLET BY MOUTH DAILY AS NEEDED FOR ANXIETY 30 Tablet 0    venlafaxine (EFFEXOR) 75 mg tablet Take 1 Tablet by mouth two (2) times a day. Indications: panic disorder 60 Tablet 5    losartan-hydroCHLOROthiazide (HYZAAR) 50-12.5 mg per tablet Take 1 Tablet by mouth daily.  Indications: high blood pressure 90 Tablet 1    dexlansoprazole (Dexilant) 60 mg CpDB capsule (delayed release) TAKE 1 CAPSULE BY MOUTH EVERY DAY  Indications: gastroesophageal reflux disease 90 Cap 3    naproxen (NAPROSYN) 500 mg tablet Take 1 Tab by mouth every twelve (12) hours as needed for Pain. 30 Tab 0     Past Medical History:   Diagnosis Date    Anxiety ~1997    Cancer [facility])     melanoma chest Dr Bina Gage fx [phone]    Esophageal hiatal hernia 2/4/14    GERD (gastroesophageal reflux disease)     Endoscopies normal.     Hematospermia 3/09    Hematuria     Hip joint pain 10/18/10    dr Marisol Garcia IBS (irritable bowel syndrome)     based on history    Melanoma of trunk ([facility] [address] 10/18/10     with Dr. Ania Pizarro fx [phone]    Panic disorder     Pulmonary nodule, right 5/09    4mm nodule right lung- incidental finding on CT scan Uro did for hematuria     Past Surgical History:   Procedure Laterality Date    HX ENDOSCOPY  2/4/14    EGD Dr Frances Becker HX ORTHOPAEDIC  5/1/07     herniated disc surg in [address] [facility]      mole right chest- now melanoma    HX OTHER SURGICAL  11/23/10    dr Dawson Mackey for Melanoma excision    HX OTHER SURGICAL      STRETTA procedure.   Done in [place]     No Known Allergies    REVIEW OF SYSTEMS:                                        POSITIVE= bold text  Negative = regular text    General:                     fever, chills, sweats, generalized weakness, weight loss/gain,                                       loss of appetite   Eyes:                           blurred vision, eye pain, loss of vision, double vision  ENT:                            rhinorrhea, pharyngitis   Respiratory:               cough, sputum production, SOB, BARRIOS, wheezing, pleuritic pain   Cardiology:                chest pain, palpitations, orthopnea, PND, edema, syncope   Gastrointestinal:       abdominal pain , N/V, diarrhea, dysphagia, constipation, bleeding   Genitourinary:           frequency, urgency, dysuria, hematuria, incontinence Muskuloskeletal :      arthralgia, myalgia, back pain  Hematology:              easy bruising, nose or gum bleeding, lymphadenopathy   Dermatological:         rash, ulceration, pruritis, color change / jaundice  Endocrine:                 hot flashes or polydipsia   Neurological:             headache, dizziness, confusion, focal weakness, paresthesia,                                      Speech difficulties, memory loss, gait difficulty  Psychological:          Feelings of anxiety, depression, agitation        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[employer]     Employer: [employer]   Tobacco Use    Smoking status: Former Smoker     Packs/day: 1.00     Years: 9.00     Pack years: 9.00     Quit date: 1997     Years since quittin.6    Smokeless tobacco: Never Used   Substance and Sexual Activity    Alcohol use: Not Currently     Comment: very rare, max 3-4 drinks at one time    Drug use: No    Sexual activity: Yes     Partners: Female     Social Determinants of Health     Financial Resource Strain:     Difficulty of Paying Living Expenses:    Food Insecurity:     Worried About Running Out of Food in the Last Year:     Ran Out of Food in the Last Year:    Transportation Needs:     Lack of Transportation (Medical):      Lack of Transportation (Non-Medical):    Physical Activity:     Days of Exercise per Week:     Minutes of Exercise per Session:    Stress:     Feeling of Stress :    Social Connections:     Frequency of Communication with Friends and Family:     Frequency of Social Gatherings with Friends and Family:     Attends Shinto Services:     Active Member of Clubs or Organizations:     Attends Club or Organization Meetings:     Marital Status: Family History   Problem Relation Age of Onset    Heart Disease Father     Psychiatric Disorder Mother         suicide, lifelong severe anxiety    Cancer Maternal Grandfather        OBJECTIVE:     Visit Vitals  BP (!) 151/106 (BP 1 Location: Left arm, BP Patient Position: Sitting, BP Cuff Size: Large adult)   Pulse 84   Resp 18   Ht 5' 9\" (1.753 m)   Wt 223 lb (101.2 kg)   SpO2 98%   BMI 32.93 kg/m²       Constitutional: He appears well nourished, of stated age, and dressed appropriately. Eyes: Sclera anicteric, PERRLA, EOMI  Neck: Supple without lymphadenopathy. Thyroid normal, No JVD or bruits  Respiratory: Clear to ascultation X5, normal inspiratory effort, no adventitious breath sounds. Cardiovascular: Regular rate and rhythm, no murmurs, rubs or gallops, PMI not displaced, No thrills, no peripheral edema  Neuro: Non-focal exam, A & O X 3  Psychiatric: Appropriate affect and demeanor, pleasant and cooperative. Patient's thought content and thought processing appear to be within normal limits. ASSESSMENT/PLAN:     ICD-10-CM ICD-9-CM    1. Panic anxiety syndrome  F41.0 300.01    2. Gastroesophageal reflux disease without esophagitis  K21.9 530.81 famotidine (PEPCID) 40 mg tablet   3. Hiatal hernia  K44.9 553.3 famotidine (PEPCID) 40 mg tablet   4. Essential hypertension  I10 401.9    5. Seasonal allergic rhinitis due to pollen  J30.1 477.0 fexofenadine (ALLEGRA) 180 mg tablet   6. Long-term current use of benzodiazepine  Z79.899 V58.69 TOXASSURE SELECT 13 (MW)      TOXASSURE SELECT 15 (MW)     1: We will obtain tox assure 13 today for urine drug screen. 2: Patient to start famotidine 40 mg nightly. Continue Dexilant 60 mg each morning. 3: Patient to resume losartan/HCTZ every morning, and use metoprolol in the evening. 4: Patient to discontinue Allegra-D. I will start him on plain Allegra 180 mg daily for management of allergies.   5: Patient to follow-up with me in approximately 3 months, or sooner as needed. Patient states understanding and agrees with plan. Orders Placed This Encounter    [address] 13 (MW)         ATTENTION:   This medical record was transcribed using an electronic medical records system. Although proofread, it may and can contain electronic and spelling errors. Other human spelling and other errors may be present. Corrections may be executed at a later time. Please feel free to contact us for any clarifications as needed. Signed,  Yuliya Crew.  JOHN Roman APRN FNP-BC Additional Notes: Patient consent was obtained to proceed with the visit and recommended plan of care after discussion of all risks and benefits, including the risks of COVID-19 exposure. Detail Level: Simple Render Risk Assessment In Note?: yes

## 2022-09-06 DIAGNOSIS — I10 ESSENTIAL HYPERTENSION: ICD-10-CM

## 2022-09-06 RX ORDER — VALSARTAN AND HYDROCHLOROTHIAZIDE 160; 25 MG/1; MG/1
TABLET ORAL
Qty: 90 TABLET | Refills: 1 | Status: SHIPPED | OUTPATIENT
Start: 2022-09-06

## 2022-09-22 DIAGNOSIS — F41.0 PANIC ANXIETY SYNDROME: ICD-10-CM

## 2022-09-23 DIAGNOSIS — K21.9 GASTROESOPHAGEAL REFLUX DISEASE WITHOUT ESOPHAGITIS: ICD-10-CM

## 2022-09-23 DIAGNOSIS — K44.9 HIATAL HERNIA: ICD-10-CM

## 2022-09-23 RX ORDER — ALPRAZOLAM 1 MG/1
TABLET ORAL
Qty: 30 TABLET | Refills: 0 | Status: SHIPPED | OUTPATIENT
Start: 2022-09-23 | End: 2022-11-02

## 2022-09-23 RX ORDER — FAMOTIDINE 40 MG/1
40 TABLET, FILM COATED ORAL
Qty: 90 TABLET | Refills: 1 | Status: SHIPPED | OUTPATIENT
Start: 2022-09-23

## 2022-10-03 ENCOUNTER — OFFICE VISIT (OUTPATIENT)
Dept: INTERNAL MEDICINE CLINIC | Age: 47
End: 2022-10-03
Payer: COMMERCIAL

## 2022-10-03 VITALS
HEIGHT: 69 IN | TEMPERATURE: 98.2 F | SYSTOLIC BLOOD PRESSURE: 134 MMHG | WEIGHT: 207.6 LBS | DIASTOLIC BLOOD PRESSURE: 92 MMHG | OXYGEN SATURATION: 97 % | RESPIRATION RATE: 16 BRPM | HEART RATE: 85 BPM | BODY MASS INDEX: 30.75 KG/M2

## 2022-10-03 DIAGNOSIS — F41.0 PANIC ANXIETY SYNDROME: ICD-10-CM

## 2022-10-03 DIAGNOSIS — I10 ESSENTIAL HYPERTENSION: Primary | ICD-10-CM

## 2022-10-03 DIAGNOSIS — Z98.890 HISTORY OF FUNDOPLICATION: ICD-10-CM

## 2022-10-03 DIAGNOSIS — Z79.899 LONG-TERM CURRENT USE OF BENZODIAZEPINE: ICD-10-CM

## 2022-10-03 DIAGNOSIS — E83.52 HYPERCALCEMIA: ICD-10-CM

## 2022-10-03 PROCEDURE — 99214 OFFICE O/P EST MOD 30 MIN: CPT | Performed by: NURSE PRACTITIONER

## 2022-10-03 NOTE — PROGRESS NOTES
Chief Complaint   Patient presents with    GERD     Follow up       SUBJECTIVE:    Madeleine Glasgow is a 48 y.o. male who is here today for a follow up appointment regarding his hypertension and history of reflux. The patient states he had a transoral incisionless fundoplication (TIF) procedure done in August of this year due to worsening GERD. He states that the procedure has helped his reflux symptoms significantly, and he is no longer on gastric acid blocking agents at this time. He states he has gradually reintroduce foods per their recommendation over time. He has had some weight loss, but this is also intentional.    The patient is also being managed for hypertension. He states he has been taking his valsartan/HCTZ daily, but \"often forgets\" to take his metoprolol. He states when using the 2 agents together, he thought he was having some upset stomach side effects, and tried  the tube. However, he would end up forgetting to take his metoprolol altogether on most days. He has not tried taking the 2 together since his TIF procedure. He continues to have periodic episodes of panic/anxiety attacks. He takes alprazolam as needed which she states is helpful. I have also reminded him that the metoprolol can be beneficial for this issue as well. 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 Current Outpatient Medications   Medication Sig Dispense Refill    ALPRAZolam (XANAX) 1 mg tablet TAKE 1/2 TO 1 TABLET BY MOUTH AS NEEDED FOR ANXIETY, MUST SEE PCP FOR FURTHER REFILLS 30 Tablet 0    valsartan-hydroCHLOROthiazide (DIOVAN-HCT) 160-25 mg per tablet TAKE 1 TABLET BY MOUTH EVERY DAY FOR BLOOD PRESSURE 90 Tablet 1    butalbital-acetaminophen-caffeine (FIORICET, ESGIC) -40 mg per tablet Take 1 Tablet by mouth every six (6) hours as needed for Headache.  Indications: tension headache 60 Tablet 2    venlafaxine (EFFEXOR) 75 mg tablet Take 1 Tablet by mouth two (2) times a day. Indications: panic disorder 60 Tablet 5    famotidine (PEPCID) 40 mg tablet TAKE 1 TABLET BY MOUTH NIGHTLY. INDICATIONS: GASTROESOPHAGEAL REFLUX DISEASE (Patient not taking: Reported on 10/3/2022) 90 Tablet 1    esomeprazole (NexIUM) 40 mg capsule Take 1 Capsule by mouth daily. Indications: gastroesophageal reflux disease (Patient not taking: Reported on 10/3/2022) 90 Capsule 1    metoprolol succinate (TOPROL-XL) 50 mg XL tablet TAKE 1 TAB BY MOUTH DAILY. INDICATIONS: HIGH BLOOD PRESSURE (Patient not taking: Reported on 10/3/2022) 90 Tablet 1    cetirizine (ZYRTEC) 10 mg tablet Take 1 Tablet by mouth daily. (Patient not taking: Reported on 10/3/2022) 90 Tablet 1    naproxen (NAPROSYN) 500 mg tablet Take 1 Tab by mouth every twelve (12) hours as needed for Pain. (Patient not taking: No sig reported) 30 Tab 0     Past Medical History:   Diagnosis Date    Anxiety ~1997    Cancer [facility])     melanoma chest Dr Rajendra Pearson fx [phone]    Esophageal hiatal hernia 2/4/14    GERD (gastroesophageal reflux disease)     Endoscopies normal.     Hematospermia 3/09    Hematuria     Hip joint pain 10/18/10    dr Lea Fernandez    IBS (irritable bowel syndrome)     based on history    Melanoma of trunk ([facility] [address] 10/18/10     with Dr. Rocha Check fx [phone]    Panic disorder     Pulmonary nodule, right 5/09    4mm nodule right lung- incidental finding on CT scan Uro did for hematuria     Past Surgical History:   Procedure Laterality Date    HX ENDOSCOPY  2/4/14    EGD Dr Jason Bartlett  5/1/07     herniated disc surg in [address]      mole right chest- now melanoma    HX OTHER SURGICAL  11/23/10    dr Jasmyne Rios for Melanoma excision    HX OTHER SURGICAL      STRETTA procedure.   Done in [place]     No Known Allergies    REVIEW OF SYSTEMS:                                        POSITIVE= bold text  Negative = regular text    General:                     fever, chills, sweats, generalized weakness, weight loss/gain,                                       loss of appetite   Eyes:                           blurred vision, eye pain, loss of vision, double vision  ENT:                            rhinorrhea, pharyngitis   Respiratory:               cough, sputum production, SOB, BARRIOS, wheezing, pleuritic pain   Cardiology:                chest pain, palpitations, orthopnea, PND, edema, syncope   Gastrointestinal:       abdominal pain , N/V, diarrhea, dysphagia, constipation, bleeding   Genitourinary:           frequency, urgency, dysuria, hematuria, incontinence   Muskuloskeletal :      arthralgia, myalgia, back pain  Hematology:              easy bruising, nose or gum bleeding, lymphadenopathy   Dermatological:         rash, ulceration, pruritis, color change / jaundice  Endocrine:                 hot flashes or polydipsia   Neurological:             headache, dizziness, confusion, focal weakness, paresthesia,                                      Speech difficulties, memory loss, gait difficulty  Psychological:          Feelings of anxiety, depression, agitation        Social History     Socioeconomic History    Marital status: LEGALLY    Occupational History    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[employer]     Employer: [employer]   Tobacco Use    Smoking status: Former     Packs/day: 1.00     Years: 9.00     Pack years: 9.00     Types: Cigarettes     Quit date: 1997     Years since quittin.6    Smokeless tobacco: Never   Vaping Use    Vaping Use: Never used   Substance and Sexual Activity    Alcohol use: Yes     Comment: very rare, max 3-4 drinks at one time    Drug use: Yes     Types: Marijuana    Sexual activity: Yes     Partners: Female     Social Determinants of Health     Financial Resource Strain: Low Risk     Difficulty of Paying Living Expenses: Not hard at all   Food Insecurity: No Food Insecurity    Worried About Running Out of Food in the Last Year: Never true    Ran Out of Food in the Last Year: Never true     Family History   Problem Relation Age of Onset    Heart Disease Father     Psychiatric Disorder Mother         suicide, lifelong severe anxiety    Cancer Maternal Grandfather        OBJECTIVE:     Visit Vitals  BP (!) 134/92 (BP 1 Location: Left arm, BP Patient Position: Sitting, BP Cuff Size: Adult)   Pulse 85   Temp 98.2 °F (36.8 °C) (Oral)   Resp 16   Ht 5' 9\" (1.753 m)   Wt 207 lb 9.6 oz (94.2 kg)   SpO2 97%   BMI 30.66 kg/m²       Constitutional: He appears well nourished, of stated age, and dressed appropriately. Eyes: Sclera anicteric, PERRLA, EOMI  Neck: Supple without lymphadenopathy. Thyroid normal, No JVD or bruits  Respiratory: Clear to ascultation X5, normal inspiratory effort, no adventitious breath sounds. Cardiovascular: Regular rate and rhythm, no murmurs, rubs or gallops, PMI not displaced, No thrills, no peripheral edema  Neuro: Non-focal exam, A & O X 3.   Psychiatric: Appropriate affect and demeanor, pleasant and cooperative. Patient's thought content and thought processing appear to be within normal limits. ASSESSMENT/PLAN:     ICD-10-CM ICD-9-CM    1. Essential hypertension  H78 740.2 METABOLIC PANEL, COMPREHENSIVE      LIPID PANEL      2. Panic anxiety syndrome  F41.0 300.01       3. Long-term current use of benzodiazepine  Z79.899 V58.69 TOXASSURE SELECT 13 (MW)      4. Hypercalcemia  V35.00 491.14 METABOLIC PANEL, COMPREHENSIVE      VITAMIN D, 25 HYDROXY      5. History of fundoplication  O48.390 Y00.21         1: We will repeat labs today including: CMP, lipid panel, vitamin D level, and tox assure 13 urine drug screen. 2: Patient counseled on appropriate medication management and use. Please use metoprolol daily as prescribed for benefit. Patient acknowledges and states understanding. 3: Encouraged healthy lifestyle management and appropriate dietary intake. Exercise regularly.   4: Continue alprazolam as needed for anxiety/panic attacks. 5: Patient to follow-up with me in approximately 4 months, or sooner as needed. Patient states understanding and agrees with plan. Orders Placed This Encounter    METABOLIC PANEL, COMPREHENSIVE    LIPID PANEL    VITAMIN D, 25 HYDROXY    TOXASSURE SELECT 13 (MW)         ATTENTION:   This medical record was transcribed using an electronic medical records system. Although proofread, it may and can contain electronic and spelling errors. Other human spelling and other errors may be present. Corrections may be executed at a later time. Please feel free to contact us for any clarifications as needed. Follow-up and Dispositions    Return in about 4 months (around 2/3/2023) for Follow up. Signed,  Rayma Show.  Efra Taylor, MSN APRN FNP-BC

## 2022-10-03 NOTE — PROGRESS NOTES
Giuseppe Gutierrez is a 48 y.o. male     Chief Complaint   Patient presents with    GERD     Follow up       Visit Vitals  BP (!) 134/92 (BP 1 Location: Left arm, BP Patient Position: Sitting, BP Cuff Size: Adult)   Pulse 85   Temp 98.2 °F (36.8 °C) (Oral)   Resp 16   Ht 5' 9\" (1.753 m)   Wt 207 lb 9.6 oz (94.2 kg)   SpO2 97%   BMI 30.66 kg/m²       Health Maintenance Due   Topic Date Due    COVID-19 Vaccine (1) Never done    Colorectal Cancer Screening Combo  Never done    Flu Vaccine (1) Never done    Lipid Screen  10/17/2022         1. \"Have you been to the ER, urgent care clinic since your last visit? Hospitalized since your last visit? \" No    2. \"Have you seen or consulted any other health care providers outside of the [address] [place] [address] since your last visit? \" No     3. For patients aged 39-70: Has the patient had a colonoscopy / FIT/ Cologuard? No      If the patient is female:    4. For patients aged 41-77: Has the patient had a mammogram within the past 2 years? NA - based on age or sex      11. For patients aged 21-65: Has the patient had a pap smear?  NA - based on age or sex

## 2022-10-04 LAB
25(OH)D3 SERPL-MCNC: 30 NG/ML (ref 30–100)
ALBUMIN SERPL-MCNC: 4.4 G/DL (ref 3.5–5)
ALBUMIN/GLOB SERPL: 1.8 {RATIO} (ref 1.1–2.2)
ALP SERPL-CCNC: 88 U/L (ref 45–117)
ALT SERPL-CCNC: 27 U/L (ref 12–78)
ANION GAP SERPL CALC-SCNC: 3 MMOL/L (ref 5–15)
AST SERPL-CCNC: 15 U/L (ref 15–37)
BILIRUB SERPL-MCNC: 0.8 MG/DL (ref 0.2–1)
BUN SERPL-MCNC: 10 MG/DL (ref 6–20)
BUN/CREAT SERPL: 10 (ref 12–20)
CALCIUM SERPL-MCNC: 9.3 MG/DL (ref 8.5–10.1)
CHLORIDE SERPL-SCNC: 106 MMOL/L (ref 97–108)
CHOLEST SERPL-MCNC: 142 MG/DL
CO2 SERPL-SCNC: 31 MMOL/L (ref 21–32)
CREAT SERPL-MCNC: 1.04 MG/DL (ref 0.7–1.3)
GLOBULIN SER CALC-MCNC: 2.5 G/DL (ref 2–4)
GLUCOSE SERPL-MCNC: 94 MG/DL (ref 65–100)
HDLC SERPL-MCNC: 30 MG/DL
HDLC SERPL: 4.7 {RATIO} (ref 0–5)
LDLC SERPL CALC-MCNC: 88.2 MG/DL (ref 0–100)
POTASSIUM SERPL-SCNC: 3.6 MMOL/L (ref 3.5–5.1)
PROT SERPL-MCNC: 6.9 G/DL (ref 6.4–8.2)
SODIUM SERPL-SCNC: 140 MMOL/L (ref 136–145)
TRIGL SERPL-MCNC: 119 MG/DL (ref ?–150)
VLDLC SERPL CALC-MCNC: 23.8 MG/DL

## 2022-10-04 NOTE — PROGRESS NOTES
Vitamin D level within normal limits. Cholesterol levels look good overall, but healthy cholesterol is a bit low. Continue healthy diet and increase regular exercise patterns to raise HDL. Metabolic panel is normal.    We are awaiting your urine drug screen.

## 2022-10-11 LAB — DRUGS UR: NORMAL

## 2023-02-06 ENCOUNTER — OFFICE VISIT (OUTPATIENT)
Dept: INTERNAL MEDICINE CLINIC | Age: 48
End: 2023-02-06
Payer: COMMERCIAL

## 2023-02-06 VITALS
SYSTOLIC BLOOD PRESSURE: 126 MMHG | WEIGHT: 208.8 LBS | HEIGHT: 69 IN | OXYGEN SATURATION: 97 % | TEMPERATURE: 98.2 F | DIASTOLIC BLOOD PRESSURE: 82 MMHG | RESPIRATION RATE: 16 BRPM | HEART RATE: 68 BPM | BODY MASS INDEX: 30.93 KG/M2

## 2023-02-06 DIAGNOSIS — F41.0 PANIC ANXIETY SYNDROME: ICD-10-CM

## 2023-02-06 DIAGNOSIS — I10 ESSENTIAL HYPERTENSION: Primary | ICD-10-CM

## 2023-02-06 DIAGNOSIS — K21.9 GASTROESOPHAGEAL REFLUX DISEASE WITHOUT ESOPHAGITIS: ICD-10-CM

## 2023-02-06 DIAGNOSIS — Z12.11 SCREEN FOR COLON CANCER: ICD-10-CM

## 2023-02-06 DIAGNOSIS — Z79.899 LONG-TERM CURRENT USE OF BENZODIAZEPINE: ICD-10-CM

## 2023-02-06 DIAGNOSIS — Z98.890 HISTORY OF FUNDOPLICATION: ICD-10-CM

## 2023-02-06 PROCEDURE — 3074F SYST BP LT 130 MM HG: CPT | Performed by: NURSE PRACTITIONER

## 2023-02-06 PROCEDURE — 99214 OFFICE O/P EST MOD 30 MIN: CPT | Performed by: NURSE PRACTITIONER

## 2023-02-06 PROCEDURE — 3079F DIAST BP 80-89 MM HG: CPT | Performed by: NURSE PRACTITIONER

## 2023-02-06 NOTE — PROGRESS NOTES
Chief Complaint   Patient presents with    Hypertension     2M       SUBJECTIVE:    Harvey Willams is a 50 y.o. male who is here today for a follow up appointment regarding her medical conditions including: Essential hypertension, panic anxiety syndrome, GERD with history of fundoplication, and longstanding current use of benzodiazepine. He states he is feeling well overall, and denies any new acute complaints. Patient is currently on valsartan/HCTZ and metoprolol succinate for management of his hypertension. His blood pressure appears well controlled at this time, and he denies any adverse side effects of the medication. He denies any recent episodes of chest pain, chest pressure, shortness of breath, headaches, dizziness, blurred vision, palpitations, or syncope episodes. He continues using alprazolam on an as-needed basis for breakthrough anxiety. He states he uses this only periodically/sparingly, and states the medication is effective overall. We had attempted to use Effexor routinely, but the patient did not tolerate this and subsequently stopped using it. He continues to use Nexium daily as well as famotidine in the evening for management of his GERD. He states the medications have been very effective overall. However, he ran out of the Nexium, and started having some symptoms return. In the meantime, he took famotidine twice a day which worked well. He has since received in a prescription of his Nexium and will resume use. Current Outpatient Medications   Medication Sig Dispense Refill    ALPRAZolam (XANAX) 1 mg tablet TAKE 0.5-1 TABLETS BY MOUTH THREE (3) TIMES DAILY AS NEEDED FOR ANXIETY. MAX DAILY AMOUNT: 3 MG. 30 Tablet 2    famotidine (PEPCID) 40 mg tablet TAKE 1 TABLET BY MOUTH NIGHTLY.  INDICATIONS: GASTROESOPHAGEAL REFLUX DISEASE 90 Tablet 1    valsartan-hydroCHLOROthiazide (DIOVAN-HCT) 160-25 mg per tablet TAKE 1 TABLET BY MOUTH EVERY DAY FOR BLOOD PRESSURE 90 Tablet 1 esomeprazole (NexIUM) 40 mg capsule Take 1 Capsule by mouth daily. Indications: gastroesophageal reflux disease 90 Capsule 1    metoprolol succinate (TOPROL-XL) 50 mg XL tablet TAKE 1 TAB BY MOUTH DAILY. INDICATIONS: HIGH BLOOD PRESSURE 90 Tablet 1    butalbital-acetaminophen-caffeine (FIORICET, ESGIC) -40 mg per tablet Take 1 Tablet by mouth every six (6) hours as needed for Headache. Indications: tension headache (Patient not taking: Reported on 2/6/2023) 60 Tablet 2    cetirizine (ZYRTEC) 10 mg tablet Take 1 Tablet by mouth daily. (Patient not taking: No sig reported) 90 Tablet 1    venlafaxine (EFFEXOR) 75 mg tablet Take 1 Tablet by mouth two (2) times a day. Indications: panic disorder (Patient not taking: Reported on 2/6/2023) 60 Tablet 5    naproxen (NAPROSYN) 500 mg tablet Take 1 Tab by mouth every twelve (12) hours as needed for Pain. (Patient not taking: No sig reported) 30 Tab 0     Past Medical History:   Diagnosis Date    Anxiety ~1997    Cancer [facility])     melanoma chest Dr Ge Bowman fx [phone]    Esophageal hiatal hernia 2/4/14    GERD (gastroesophageal reflux disease)     Endoscopies normal.     Hematospermia 3/09    Hematuria     Hip joint pain 10/18/10    dr Cathy Cueva    IBS (irritable bowel syndrome)     based on history    Melanoma of trunk ([facility] [address] 10/18/10     with Dr. Karen Kumar fx [phone]    Panic disorder     Pulmonary nodule, right 5/09    4mm nodule right lung- incidental finding on CT scan Uro did for hematuria     Past Surgical History:   Procedure Laterality Date    HX ENDOSCOPY  2/4/14    EGD Dr Radha Multani  5/1/07     [facility]      mole right chest- now melanoma    HX OTHER SURGICAL  11/23/10    dr Emmaline Meckel for Melanoma excision    HX OTHER SURGICAL      STRETTA procedure.   Done in [place]     No Known Allergies    REVIEW OF SYSTEMS:                                        POSITIVE= bold text Negative = regular text    General:                     fever, chills, sweats, generalized weakness, weight loss/gain,                                       loss of appetite   Eyes:                           blurred vision, eye pain, loss of vision, double vision  ENT:                            rhinorrhea, pharyngitis   Respiratory:               cough, sputum production, SOB, BARRIOS, wheezing, pleuritic pain   Cardiology:                chest pain, palpitations, orthopnea, PND, edema, syncope   Gastrointestinal:       abdominal pain , N/V, diarrhea, dysphagia, constipation, bleeding   Genitourinary:           frequency, urgency, dysuria, hematuria, incontinence   Muskuloskeletal :      arthralgia, myalgia, back pain  Hematology:              easy bruising, nose or gum bleeding, lymphadenopathy   Dermatological:         rash, ulceration, pruritis, color change / jaundice  Endocrine:                 hot flashes or polydipsia   Neurological:             headache, dizziness, confusion, focal weakness, paresthesia,                                      Speech difficulties, memory loss, gait difficulty  Psychological:          Feelings of anxiety, depression, agitation        Social History     Socioeconomic History    Marital status: LEGALLY    Occupational History    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[employer]   Tobacco Use    Smoking status: Former     Packs/day: 1.00     Years: 9.00     Pack years: 9.00     Types: Cigarettes     Quit date: 1997     Years since quittin.9    Smokeless tobacco: Never   Vaping Use    Vaping Use: Never used   Substance and Sexual Activity    Alcohol use: Yes     Comment: very rare, max 3-4 drinks at one time    Drug use: Yes     Types: Marijuana    Sexual activity: Yes     Partners: Female     Social Determinants of Health     Financial Resource Strain: Low Risk     Difficulty of Paying Living Expenses: Not hard at all   Food Insecurity: No Food Insecurity Worried About Running Out of Food in the Last Year: Never true    Ran Out of Food in the Last Year: Never true     Family History   Problem Relation Age of Onset    Heart Disease Father     Psychiatric Disorder Mother         suicide, lifelong severe anxiety    Cancer Maternal Grandfather        OBJECTIVE:     Visit Vitals  /82 (BP 1 Location: Left upper arm, BP Patient Position: Sitting, BP Cuff Size: Adult)   Pulse 68   Temp 98.2 °F (36.8 °C) (Oral)   Resp 16   Ht 5' 9\" (1.753 m)   Wt 208 lb 12.8 oz (94.7 kg)   SpO2 97%   BMI 30.83 kg/m²       Constitutional: He appears well nourished, of stated age, and dressed appropriately. Eyes: Sclera anicteric, PERRLA, EOMI  Neck: Supple without lymphadenopathy. Thyroid normal, No JVD or bruits  Respiratory: Clear to ascultation X5, normal inspiratory effort, no adventitious breath sounds. Cardiovascular: Regular rate and rhythm, no murmurs, rubs or gallops, PMI not displaced, No thrills, no peripheral edema  Neuro: Non-focal exam, A & O X 3.   Psychiatric: Appropriate affect and demeanor, pleasant and cooperative. Patient's thought content and thought processing appear to be within normal limits. Controlled Substance Monitoring:    RX Monitoring 2/6/2023   Periodic Controlled Substance Monitoring Possible medication side effects, risk of tolerance/dependence & alternative treatments discussed. ;No signs of potential drug abuse or diversion identified. ;Assessed functional status. ASSESSMENT/PLAN:     ICD-10-CM ICD-9-CM    1. Essential hypertension  I10 401.9       2. Panic anxiety syndrome  F41.0 300.01       3. Gastroesophageal reflux disease without esophagitis  K21.9 530.81       4. Long-term current use of benzodiazepine  Z79.899 V58.69       5. History of fundoplication  C73.160 O13.88       6. Screen for colon cancer  Z12.11 V76.51 REFERRAL FOR COLONOSCOPY        1: Patient to continue current medication for management of hypertension.   Blood pressure appears stable and well controlled at this time. 2: Patient to continue alprazolam on an as-needed basis for management of anxiety. Patient tolerating well. 3: Continue Nexium and famotidine use for management of GERD. We will likely convert to famotidine twice daily use in the future. 4: Patient will be referred to gastroenterology for screening colonoscopy. 5: Continue healthy lifestyle management and appropriate dietary intake. 6: Patient to follow-up with me in approximately 4 months, or sooner as needed. Patient states understanding and agrees with plan. Orders Placed This Encounter    Referral for Colonoscopy (options for GI, Colon &  Rectal Surgery, & General Surgery)         ATTENTION:   This medical record was transcribed using an electronic medical records system. Although proofread, it may and can contain electronic and spelling errors. Other human spelling and other errors may be present. Corrections may be executed at a later time. Please feel free to contact us for any clarifications as needed. Follow-up and Dispositions    Return in about 4 months (around 6/6/2023) for Follow up with fasting labs. Signed,  Kanwal Velarde.  Richard Cristobal, MSN APRN FNP-BC

## 2023-02-06 NOTE — PROGRESS NOTES
Yoselyn Hines is a 50 y.o. male     Chief Complaint   Patient presents with    Hypertension     2M       Visit Vitals  /82 (BP 1 Location: Left upper arm, BP Patient Position: Sitting, BP Cuff Size: Adult)   Pulse 68   Temp 98.2 °F (36.8 °C) (Oral)   Resp 16   Ht 5' 9\" (1.753 m)   Wt 208 lb 12.8 oz (94.7 kg)   SpO2 97%   BMI 30.83 kg/m²       Health Maintenance Due   Topic Date Due    COVID-19 Vaccine (1) Never done    Colorectal Cancer Screening Combo  Never done    Flu Vaccine (1) Never done         1. \"Have you been to the ER, urgent care clinic since your last visit? Hospitalized since your last visit? \" No    2. \"Have you seen or consulted any other health care providers outside of the [address] [place] [address] since your last visit? \" No     3. For patients aged 39-70: Has the patient had a colonoscopy / FIT/ Cologuard? No      If the patient is female:    4. For patients aged 41-77: Has the patient had a mammogram within the past 2 years? NA - based on age or sex      11. For patients aged 21-65: Has the patient had a pap smear?  NA - based on age or sex

## 2023-03-01 DIAGNOSIS — I10 ESSENTIAL HYPERTENSION: ICD-10-CM

## 2023-03-02 RX ORDER — VALSARTAN AND HYDROCHLOROTHIAZIDE 160; 25 MG/1; MG/1
TABLET ORAL
Qty: 90 TABLET | Refills: 1 | Status: SHIPPED | OUTPATIENT
Start: 2023-03-02

## 2023-03-06 DIAGNOSIS — K44.9 HIATAL HERNIA: ICD-10-CM

## 2023-03-06 DIAGNOSIS — F41.0 PANIC ANXIETY SYNDROME: ICD-10-CM

## 2023-03-06 DIAGNOSIS — K21.9 GASTROESOPHAGEAL REFLUX DISEASE WITHOUT ESOPHAGITIS: ICD-10-CM

## 2023-03-06 DIAGNOSIS — R03.0 ELEVATED BLOOD PRESSURE READING WITHOUT DIAGNOSIS OF HYPERTENSION: ICD-10-CM

## 2023-03-06 RX ORDER — METOPROLOL SUCCINATE 50 MG/1
50 TABLET, EXTENDED RELEASE ORAL DAILY
Qty: 90 TABLET | Refills: 1 | Status: SHIPPED | OUTPATIENT
Start: 2023-03-06

## 2023-03-06 RX ORDER — FAMOTIDINE 40 MG/1
40 TABLET, FILM COATED ORAL
Qty: 90 TABLET | Refills: 1 | Status: SHIPPED | OUTPATIENT
Start: 2023-03-06

## 2023-03-06 NOTE — TELEPHONE ENCOUNTER
PCP: Kiana Cooney NP    Last appt: 2/6/2023  Future Appointments   Date Time Provider Juan Jose Barriga   6/12/2023  9:00 AM Kiana Cooney NP [facility] AMB       Requested Prescriptions     Pending Prescriptions Disp Refills    metoprolol succinate (TOPROL-XL) 50 mg XL tablet 90 Tablet 1     Sig: Take 1 Tablet by mouth daily. Indications: high blood pressure    famotidine (PEPCID) 40 mg tablet 90 Tablet 1     Sig: Take 1 Tablet by mouth nightly.  Indications: gastroesophageal reflux disease       Prior labs and Blood pressures:  BP Readings from Last 3 Encounters:   02/06/23 126/82   10/03/22 (!) 134/92   03/15/22 136/88     Lab Results   Component Value Date/Time    Sodium 140 10/03/2022 10:04 AM    Potassium 3.6 10/03/2022 10:04 AM    Chloride 106 10/03/2022 10:04 AM    CO2 31 10/03/2022 10:04 AM    Anion gap 3 (L) 10/03/2022 10:04 AM    Glucose 94 10/03/2022 10:04 AM    BUN 10 10/03/2022 10:04 AM    Creatinine 1.04 10/03/2022 10:04 AM    BUN/Creatinine ratio 10 (L) 10/03/2022 10:04 AM    GFR est AA >60 10/03/2022 10:04 AM    GFR est non-AA >60 10/03/2022 10:04 AM    Calcium 9.3 10/03/2022 10:04 AM     Lab Results   Component Value Date/Time    Hemoglobin A1c 4.8 10/17/2017 11:38 AM    Hemoglobin A1c (POC) 4.9 05/08/2012 12:36 PM     Lab Results   Component Value Date/Time    Cholesterol, total 142 10/03/2022 10:04 AM    HDL Cholesterol 30 10/03/2022 10:04 AM    LDL, calculated 88.2 10/03/2022 10:04 AM    VLDL, calculated 23.8 10/03/2022 10:04 AM    Triglyceride 119 10/03/2022 10:04 AM    CHOL/HDL Ratio 4.7 10/03/2022 10:04 AM     Lab Results   Component Value Date/Time    Vitamin D 25-Hydroxy 30.0 10/03/2022 10:04 AM       Lab Results   Component Value Date/Time    TSH 2.240 09/27/2010 10:11 AM    TSH, 3rd generation 1.94 01/13/2021 09:28 AM

## 2023-03-10 ENCOUNTER — PATIENT MESSAGE (OUTPATIENT)
Dept: INTERNAL MEDICINE CLINIC | Age: 48
End: 2023-03-10

## 2023-03-10 DIAGNOSIS — K21.9 GASTROESOPHAGEAL REFLUX DISEASE WITHOUT ESOPHAGITIS: Primary | ICD-10-CM

## 2023-03-13 RX ORDER — FAMOTIDINE 40 MG/1
40 TABLET, FILM COATED ORAL 2 TIMES DAILY
Qty: 180 TABLET | Refills: 1 | Status: SHIPPED | OUTPATIENT
Start: 2023-03-13

## 2023-03-13 NOTE — TELEPHONE ENCOUNTER
Bree Hester 3/13/2023 4:31 PM EDT      ----- Message -----  From: Chante Mondragon  Sent: 3/10/2023 8:58 PM EDT  To: Danisha Almanza Nurses  Subject: Famotidine 40mg     Can you send a new prescription over to the pharmacy that states I take 2 a day. Also I need a 90 day supply.  Thank you

## 2023-05-30 RX ORDER — PANTOPRAZOLE SODIUM 40 MG/1
TABLET, DELAYED RELEASE ORAL
Qty: 90 TABLET | Refills: 1 | Status: SHIPPED | OUTPATIENT
Start: 2023-05-30

## 2023-05-30 NOTE — TELEPHONE ENCOUNTER
PCP: NITHIN Hoyos NP    Last appt: 2/6/2023  Future Appointments   Date Time Provider Gunner Flores   6/12/2023  9:00 AM NITHIN Hoyos NP [facility] AMB       Requested Prescriptions     Pending Prescriptions Disp Refills    pantoprazole (PROTONIX) 40 MG tablet [Pharmacy Med Name: PANTOPRAZOLE SOD DR 40 MG TAB] 90 tablet 1     Sig: TAKE 1 TABLET BY MOUTH EVERY DAY       Prior labs and Blood pressures:  BP Readings from Last 3 Encounters:   02/06/23 126/82   10/03/22 (!) 134/92   03/15/22 136/88     Lab Results   Component Value Date/Time     10/03/2022 10:04 AM    K 3.6 10/03/2022 10:04 AM     10/03/2022 10:04 AM    CO2 31 10/03/2022 10:04 AM    BUN 10 10/03/2022 10:04 AM    GFRAA >60 10/03/2022 10:04 AM     No results found for: HBA1C, [id number]  Lab Results   Component Value Date/Time    CHOL 142 10/03/2022 10:04 AM    HDL 30 10/03/2022 10:04 AM     No results found for: VITD3, VD3RIA        No results found for: TSH, TSH2, TSH3

## 2023-07-21 DIAGNOSIS — F41.0 PANIC DISORDER (EPISODIC PAROXYSMAL ANXIETY): ICD-10-CM

## 2023-07-24 NOTE — TELEPHONE ENCOUNTER
PCP: NITHIN Kaur NP    Last appt: 6/13/2023  Future Appointments   Date Time Provider [address]   10/16/2023 11:00 AM NITHIN Kaur NP [facility] AMB       Requested Prescriptions     Pending Prescriptions Disp Refills    ALPRAZolam (XANAX) 1 MG tablet [Pharmacy Med Name: ALPRAZOLAM 1 MG TABLET] 30 tablet 2     Sig: TAKE 1/2-1 TABLET BY MOUTH THREE (3) TIMES DAILY AS NEEDED FOR ANXIETY. MAX DAILY AMOUNT: 3 MG.        Prior labs and Blood pressures:  BP Readings from Last 3 Encounters:   06/13/23 130/84   02/06/23 126/82   10/03/22 (!) 134/92     Lab Results   Component Value Date/Time     06/13/2023 09:39 AM    K 3.8 06/13/2023 09:39 AM     06/13/2023 09:39 AM    CO2 31 06/13/2023 09:39 AM    BUN 12 06/13/2023 09:39 AM    GFRAA >60 10/03/2022 10:04 AM     No results found for: HBA1C, [id number]  Lab Results   Component Value Date/Time    CHOL 180 06/13/2023 09:39 AM    HDL 30 06/13/2023 09:39 AM     No results found for: VITD3, VD3RIA        No results found for: TSH, TSH2, TSH3

## 2023-07-25 RX ORDER — ALPRAZOLAM 1 MG/1
TABLET ORAL
Qty: 30 TABLET | Refills: 2 | Status: SHIPPED | OUTPATIENT
Start: 2023-07-25 | End: 2024-07-24

## 2023-08-27 DIAGNOSIS — I10 ESSENTIAL (PRIMARY) HYPERTENSION: ICD-10-CM

## 2023-08-28 RX ORDER — VALSARTAN AND HYDROCHLOROTHIAZIDE 160; 25 MG/1; MG/1
TABLET ORAL
Qty: 90 TABLET | Refills: 1 | Status: SHIPPED | OUTPATIENT
Start: 2023-08-28

## 2023-08-28 NOTE — TELEPHONE ENCOUNTER
PCP: NITHIN Clark NP    Last appt: 6/13/2023  Future Appointments   Date Time Provider [address]   10/16/2023 11:00 AM NITHIN Clark NP [facility] AMB       Requested Prescriptions     Pending Prescriptions Disp Refills    valsartan-hydroCHLOROthiazide (DIOVAN-HCT) 160-25 MG per tablet [Pharmacy Med Name: VALSARTAN-HCTZ 160-25 MG TAB] 90 tablet 1     Sig: TAKE 1 TABLET BY MOUTH EVERY DAY FOR BLOOD PRESSURE       Prior labs and Blood pressures:  BP Readings from Last 3 Encounters:   06/13/23 130/84   02/06/23 126/82   10/03/22 (!) 134/92     Lab Results   Component Value Date/Time     06/13/2023 09:39 AM    K 3.8 06/13/2023 09:39 AM     06/13/2023 09:39 AM    CO2 31 06/13/2023 09:39 AM    BUN 12 06/13/2023 09:39 AM    GFRAA >60 10/03/2022 10:04 AM     No results found for: HBA1C, [id number]  Lab Results   Component Value Date/Time    CHOL 180 06/13/2023 09:39 AM    HDL 30 06/13/2023 09:39 AM     No results found for: VITD3, VD3RIA        No results found for: TSH, TSH2, TSH3

## 2023-09-07 DIAGNOSIS — K21.9 GASTRO-ESOPHAGEAL REFLUX DISEASE WITHOUT ESOPHAGITIS: ICD-10-CM

## 2023-09-07 DIAGNOSIS — R03.0 ELEVATED BLOOD-PRESSURE READING, WITHOUT DIAGNOSIS OF HYPERTENSION: ICD-10-CM

## 2023-09-07 DIAGNOSIS — F41.0 PANIC DISORDER (EPISODIC PAROXYSMAL ANXIETY): ICD-10-CM

## 2023-09-07 RX ORDER — METOPROLOL SUCCINATE 50 MG/1
TABLET, EXTENDED RELEASE ORAL
Qty: 90 TABLET | Refills: 1 | Status: SHIPPED | OUTPATIENT
Start: 2023-09-07

## 2023-09-07 RX ORDER — FAMOTIDINE 40 MG/1
TABLET, FILM COATED ORAL
Qty: 180 TABLET | Refills: 1 | Status: SHIPPED | OUTPATIENT
Start: 2023-09-07

## 2023-09-07 NOTE — TELEPHONE ENCOUNTER
PCP: NITHIN Martinez NP    Last appt: 6/13/2023  Future Appointments   Date Time Provider [address]   10/16/2023 11:00 AM NITHIN Martinez NP [facility] AMB       Requested Prescriptions     Pending Prescriptions Disp Refills    metoprolol succinate (TOPROL XL) 50 MG extended release tablet [Pharmacy Med Name: METOPROLOL SUCC ER 50 MG TAB] 90 tablet 1     Sig: TAKE 1 TABLET BY MOUTH EVERY DAY FOR HIGH BLOOD PRESSURE    famotidine (PEPCID) 40 MG tablet [Pharmacy Med Name: FAMOTIDINE 40 MG TABLET] 180 tablet 1     Sig: TAKE 1 TABLET BY MOUTH TWO TIMES A DAY.        Prior labs and Blood pressures:  BP Readings from Last 3 Encounters:   06/13/23 130/84   02/06/23 126/82   10/03/22 (!) 134/92     Lab Results   Component Value Date/Time     06/13/2023 09:39 AM    K 3.8 06/13/2023 09:39 AM     06/13/2023 09:39 AM    CO2 31 06/13/2023 09:39 AM    BUN 12 06/13/2023 09:39 AM    GFRAA >60 10/03/2022 10:04 AM     No results found for: HBA1C, [id number]  Lab Results   Component Value Date/Time    CHOL 180 06/13/2023 09:39 AM    HDL 30 06/13/2023 09:39 AM     No results found for: VITD3, VD3RIA        No results found for: TSH, TSH2, TSH3

## 2023-10-16 ENCOUNTER — OFFICE VISIT (OUTPATIENT)
Facility: CLINIC | Age: 48
End: 2023-10-16
Payer: COMMERCIAL

## 2023-10-16 VITALS
BODY MASS INDEX: 33.24 KG/M2 | OXYGEN SATURATION: 97 % | HEART RATE: 64 BPM | HEIGHT: 69 IN | RESPIRATION RATE: 18 BRPM | WEIGHT: 224.4 LBS | TEMPERATURE: 97.9 F | DIASTOLIC BLOOD PRESSURE: 80 MMHG | SYSTOLIC BLOOD PRESSURE: 118 MMHG

## 2023-10-16 DIAGNOSIS — F41.0 PANIC DISORDER (EPISODIC PAROXYSMAL ANXIETY): Primary | ICD-10-CM

## 2023-10-16 DIAGNOSIS — K21.9 GASTRO-ESOPHAGEAL REFLUX DISEASE WITHOUT ESOPHAGITIS: ICD-10-CM

## 2023-10-16 DIAGNOSIS — Z79.899 LONG-TERM CURRENT USE OF BENZODIAZEPINE: ICD-10-CM

## 2023-10-16 DIAGNOSIS — I10 ESSENTIAL HYPERTENSION: ICD-10-CM

## 2023-10-16 DIAGNOSIS — E78.2 MIXED HYPERLIPIDEMIA: ICD-10-CM

## 2023-10-16 PROCEDURE — 3074F SYST BP LT 130 MM HG: CPT | Performed by: NURSE PRACTITIONER

## 2023-10-16 PROCEDURE — 99214 OFFICE O/P EST MOD 30 MIN: CPT | Performed by: NURSE PRACTITIONER

## 2023-10-16 PROCEDURE — 3079F DIAST BP 80-89 MM HG: CPT | Performed by: NURSE PRACTITIONER

## 2023-11-24 DIAGNOSIS — F41.0 PANIC DISORDER (EPISODIC PAROXYSMAL ANXIETY): ICD-10-CM

## 2023-11-24 RX ORDER — ALPRAZOLAM 1 MG/1
TABLET ORAL
Qty: 30 TABLET | Refills: 0 | Status: SHIPPED | OUTPATIENT
Start: 2023-11-24 | End: 2024-11-23

## 2023-11-24 RX ORDER — PANTOPRAZOLE SODIUM 40 MG/1
TABLET, DELAYED RELEASE ORAL
Qty: 90 TABLET | Refills: 1 | Status: SHIPPED | OUTPATIENT
Start: 2023-11-24

## 2023-11-24 NOTE — TELEPHONE ENCOUNTER
PCP: NITHIN Ayon NP    Last appt: 10/16/2023  Future Appointments   Date Time Provider [address]   2/16/2024 10:00 AM NITHIN Ayon NP [facility] AMB       Requested Prescriptions     Pending Prescriptions Disp Refills    pantoprazole (PROTONIX) 40 MG tablet [Pharmacy Med Name: PANTOPRAZOLE SOD DR 40 MG TAB] 90 tablet 1     Sig: TAKE 1 TABLET BY MOUTH EVERY DAY       Prior labs and Blood pressures:  BP Readings from Last 3 Encounters:   10/16/23 118/80   06/13/23 130/84   02/06/23 126/82     Lab Results   Component Value Date/Time     06/13/2023 09:39 AM    K 3.8 06/13/2023 09:39 AM     06/13/2023 09:39 AM    CO2 31 06/13/2023 09:39 AM    BUN 12 06/13/2023 09:39 AM    GFRAA >60 10/03/2022 10:04 AM     No results found for: \"HBA1C\", \"[id number]\"  Lab Results   Component Value Date/Time    CHOL 180 06/13/2023 09:39 AM    HDL 30 06/13/2023 09:39 AM     No results found for: \"VITD3\", \"VD3RIA\"        No results found for: \"TSH\", \"TSH2\", \"TSH3\"

## 2023-11-24 NOTE — TELEPHONE ENCOUNTER
PCP: NITHIN Torres NP    Last appt: 10/16/2023    Future Appointments   Date Time Provider [address]   2/16/2024 10:00 AM NITHIN Torres NP [facility] AMB       Requested Prescriptions     Pending Prescriptions Disp Refills    ALPRAZolam (XANAX) 1 MG tablet [Pharmacy Med Name: ALPRAZOLAM 1 MG TABLET] 30 tablet 0     Sig: TAKE 1/2-1 TABLET BY MOUTH THREE (3) TIMES DAILY AS NEEDED FOR ANXIETY. MAX DAILY AMOUNT: 3 MG.

## 2024-01-16 DIAGNOSIS — F41.0 PANIC DISORDER (EPISODIC PAROXYSMAL ANXIETY): ICD-10-CM

## 2024-01-17 RX ORDER — ALPRAZOLAM 1 MG/1
TABLET ORAL
Qty: 30 TABLET | Refills: 0 | Status: SHIPPED | OUTPATIENT
Start: 2024-01-17 | End: 2025-01-16

## 2024-01-17 NOTE — TELEPHONE ENCOUNTER
PCP: Beto Barnes APRN - NP    Last appt: 10/16/2023    Future Appointments   Date Time Provider Department Center   2/16/2024 10:00 AM Beto Barnes APRN - NP [facility] AMB       Requested Prescriptions     Pending Prescriptions Disp Refills    ALPRAZolam (XANAX) 1 MG tablet [Pharmacy Med Name: ALPRAZOLAM 1 MG TABLET] 30 tablet 0     Sig: TAKE 1/2-1 TABLET BY MOUTH THREE (3) TIMES DAILY AS NEEDED FOR ANXIETY. MAX DAILY AMOUNT: 3 MG.

## 2024-02-17 DIAGNOSIS — F41.0 PANIC DISORDER (EPISODIC PAROXYSMAL ANXIETY): ICD-10-CM

## 2024-02-19 RX ORDER — ALPRAZOLAM 1 MG/1
TABLET ORAL
Qty: 30 TABLET | Refills: 0 | Status: SHIPPED | OUTPATIENT
Start: 2024-02-19 | End: 2025-02-18

## 2024-02-19 NOTE — TELEPHONE ENCOUNTER
PCP: Beto Barnes APRN - NP    Last appt: 10/16/2023    Future Appointments   Date Time Provider Department Center   3/19/2024 11:00 AM Beto Barnes APRN - NP [facility] AMB       Requested Prescriptions     Pending Prescriptions Disp Refills    ALPRAZolam (XANAX) 1 MG tablet [Pharmacy Med Name: ALPRAZOLAM 1 MG TABLET] 30 tablet 0     Sig: TAKE 1/2-1 TABLET BY MOUTH THREE (3) TIMES DAILY AS NEEDED FOR ANXIETY. MAX DAILY AMOUNT: 3 MG.

## 2024-02-23 DIAGNOSIS — K21.9 GASTRO-ESOPHAGEAL REFLUX DISEASE WITHOUT ESOPHAGITIS: ICD-10-CM

## 2024-02-23 DIAGNOSIS — I10 ESSENTIAL (PRIMARY) HYPERTENSION: ICD-10-CM

## 2024-02-23 RX ORDER — VALSARTAN AND HYDROCHLOROTHIAZIDE 160; 25 MG/1; MG/1
TABLET ORAL
Qty: 90 TABLET | Refills: 1 | Status: SHIPPED | OUTPATIENT
Start: 2024-02-23

## 2024-02-23 RX ORDER — FAMOTIDINE 40 MG/1
40 TABLET, FILM COATED ORAL 2 TIMES DAILY
Qty: 180 TABLET | Refills: 1 | Status: SHIPPED | OUTPATIENT
Start: 2024-02-23

## 2024-02-23 NOTE — TELEPHONE ENCOUNTER
PCP: Beto Barnes APRN - NP    Last appt: 10/16/2023    Future Appointments   Date Time Provider Department Center   3/19/2024 11:00 AM Beto Barnes APRN - NP [facility] AMB       Requested Prescriptions     Pending Prescriptions Disp Refills    valsartan-hydroCHLOROthiazide (DIOVAN-HCT) 160-25 MG per tablet [Pharmacy Med Name: VALSARTAN-HCTZ 160-25 MG TAB] 90 tablet 1     Sig: TAKE 1 TABLET BY MOUTH EVERY DAY FOR BLOOD PRESSURE    famotidine (PEPCID) 40 MG tablet [Pharmacy Med Name: FAMOTIDINE 40 MG TABLET] 180 tablet 1     Sig: TAKE 1 TABLET BY MOUTH TWICE A DAY

## 2024-03-01 DIAGNOSIS — R03.0 ELEVATED BLOOD-PRESSURE READING, WITHOUT DIAGNOSIS OF HYPERTENSION: ICD-10-CM

## 2024-03-01 DIAGNOSIS — F41.0 PANIC DISORDER (EPISODIC PAROXYSMAL ANXIETY): ICD-10-CM

## 2024-03-01 RX ORDER — METOPROLOL SUCCINATE 50 MG/1
TABLET, EXTENDED RELEASE ORAL
Qty: 90 TABLET | Refills: 1 | Status: SHIPPED | OUTPATIENT
Start: 2024-03-01

## 2024-03-01 NOTE — TELEPHONE ENCOUNTER
PCP: Beto Barnes APRN - NP    Last appt: 10/16/2023    Future Appointments   Date Time Provider Department Center   3/19/2024 11:00 AM Beto Barnes APRN - NP [facility] AMB       Requested Prescriptions     Pending Prescriptions Disp Refills    metoprolol succinate (TOPROL XL) 50 MG extended release tablet [Pharmacy Med Name: METOPROLOL SUCC ER 50 MG TAB] 90 tablet 1     Sig: TAKE 1 TABLET BY MOUTH EVERY DAY FOR HIGH BLOOD PRESSURE

## 2024-03-19 ENCOUNTER — OFFICE VISIT (OUTPATIENT)
Facility: CLINIC | Age: 49
End: 2024-03-19
Payer: COMMERCIAL

## 2024-03-19 VITALS
TEMPERATURE: 98.2 F | HEART RATE: 67 BPM | OXYGEN SATURATION: 95 % | RESPIRATION RATE: 16 BRPM | DIASTOLIC BLOOD PRESSURE: 74 MMHG | BODY MASS INDEX: 33.47 KG/M2 | SYSTOLIC BLOOD PRESSURE: 128 MMHG | HEIGHT: 69 IN | WEIGHT: 226 LBS

## 2024-03-19 DIAGNOSIS — Z12.11 SCREEN FOR COLON CANCER: ICD-10-CM

## 2024-03-19 DIAGNOSIS — F41.0 PANIC DISORDER (EPISODIC PAROXYSMAL ANXIETY): ICD-10-CM

## 2024-03-19 DIAGNOSIS — K21.9 GASTRO-ESOPHAGEAL REFLUX DISEASE WITHOUT ESOPHAGITIS: ICD-10-CM

## 2024-03-19 DIAGNOSIS — E78.2 MIXED HYPERLIPIDEMIA: ICD-10-CM

## 2024-03-19 DIAGNOSIS — R73.01 IMPAIRED FASTING GLUCOSE: ICD-10-CM

## 2024-03-19 DIAGNOSIS — I10 ESSENTIAL HYPERTENSION: Primary | ICD-10-CM

## 2024-03-19 PROCEDURE — 3078F DIAST BP <80 MM HG: CPT | Performed by: NURSE PRACTITIONER

## 2024-03-19 PROCEDURE — 99214 OFFICE O/P EST MOD 30 MIN: CPT | Performed by: NURSE PRACTITIONER

## 2024-03-19 PROCEDURE — 3074F SYST BP LT 130 MM HG: CPT | Performed by: NURSE PRACTITIONER

## 2024-03-19 ASSESSMENT — PATIENT HEALTH QUESTIONNAIRE - PHQ9
SUM OF ALL RESPONSES TO PHQ QUESTIONS 1-9: 0
SUM OF ALL RESPONSES TO PHQ QUESTIONS 1-9: 0
1. LITTLE INTEREST OR PLEASURE IN DOING THINGS: NOT AT ALL
SUM OF ALL RESPONSES TO PHQ QUESTIONS 1-9: 0
SUM OF ALL RESPONSES TO PHQ QUESTIONS 1-9: 0
2. FEELING DOWN, DEPRESSED OR HOPELESS: NOT AT ALL
SUM OF ALL RESPONSES TO PHQ9 QUESTIONS 1 & 2: 0

## 2024-03-19 NOTE — PROGRESS NOTES
Alivia Luque is a 48 y.o. male     Chief Complaint   Patient presents with    Gastroesophageal Reflux     4M    Hypertension     4M       /74 (Site: Left Upper Arm, Position: Sitting, Cuff Size: Medium Adult)   Pulse 67   Temp 98.2 °F (36.8 °C) (Oral)   Resp 16   Ht 1.753 m (5' 9\")   Wt 102.5 kg (226 lb)   SpO2 95%   BMI 33.37 kg/m²     Health Maintenance Due   Topic Date Due    Hepatitis B vaccine (1 of 3 - 3-dose series) Never done    COVID-19 Vaccine (1) Never done    DTaP/Tdap/Td vaccine (1 - Tdap) 06/25/2004    Diabetes screen  Never done    Colorectal Cancer Screen  Never done    Flu vaccine (1) Never done    Shingles vaccine (1 of 2) Never done    Depression Screen  02/06/2024         \"Have you been to the ER, urgent care clinic since your last visit?  Hospitalized since your last visit?\"    NO    “Have you seen or consulted any other health care providers outside of [facility] since your last visit?”    NO    “Have you had a colorectal cancer screening such as a colonoscopy/FIT/Cologuard?    NO    No colonoscopy on file  No cologuard on file  No FIT/FOBT on file   No flexible sigmoidoscopy on file                      
pain, palpitations, orthopnea, PND, edema, syncope   Gastrointestinal:       abdominal pain , N/V, diarrhea, dysphagia, constipation, bleeding   Genitourinary:           frequency, urgency, dysuria, hematuria, incontinence   Muskuloskeletal :      arthralgia, myalgia, back pain  Hematology:              easy bruising, nose or gum bleeding, lymphadenopathy   Dermatological:         rash, ulceration, pruritis, color change / jaundice  Endocrine:                 hot flashes or polydipsia   Neurological:             headache, dizziness, confusion, focal weakness, paresthesia,                                      Speech difficulties, memory loss, gait difficulty  Psychological:          Feelings of anxiety, depression, agitation        Social History     Socioeconomic History    Marital status: Legally      Spouse name: None    Number of children: None    Years of education: None    Highest education level: None   Tobacco Use    Smoking status: Former     Current packs/day: 0.00     Average packs/day: 1 pack/day for 10.0 years (10.0 ttl pk-yrs)     Types: Cigarettes     Quit date: 1997     Years since quittin.0    Smokeless tobacco: Never   Substance and Sexual Activity    Alcohol use: Yes    Drug use: Yes     Types: Marijuana (Weed)     Social Determinants of Health     Financial Resource Strain: Low Risk  (2023)    Overall Financial Resource Strain (CARDIA)     Difficulty of Paying Living Expenses: Not hard at all   Transportation Needs: Unknown (2023)    PRAPARE - Transportation     Lack of Transportation (Non-Medical): No   Housing Stability: Unknown (2023)    Housing Stability Vital Sign     Unstable Housing in the Last Year: No     Family History   Problem Relation Age of Onset    Heart Disease Father     Psychiatric Disorder Mother         suicide, lifelong severe anxiety    Cancer Maternal Grandfather        OBJECTIVE:     /74 (Site: Left Upper Arm, Position: Sitting, Cuff

## 2024-03-20 LAB
ALBUMIN SERPL-MCNC: 4.3 G/DL (ref 3.5–5)
ALBUMIN/GLOB SERPL: 1.4 (ref 1.1–2.2)
ALP SERPL-CCNC: 86 U/L (ref 45–117)
ALT SERPL-CCNC: 30 U/L (ref 12–78)
ANION GAP SERPL CALC-SCNC: 2 MMOL/L (ref 5–15)
AST SERPL-CCNC: 22 U/L (ref 15–37)
BILIRUB SERPL-MCNC: 0.6 MG/DL (ref 0.2–1)
BUN SERPL-MCNC: 15 MG/DL (ref 6–20)
BUN/CREAT SERPL: 15 (ref 12–20)
CALCIUM SERPL-MCNC: 9.5 MG/DL (ref 8.5–10.1)
CHLORIDE SERPL-SCNC: 105 MMOL/L (ref 97–108)
CHOLEST SERPL-MCNC: 206 MG/DL
CO2 SERPL-SCNC: 32 MMOL/L (ref 21–32)
CREAT SERPL-MCNC: 1.02 MG/DL (ref 0.7–1.3)
EST. AVERAGE GLUCOSE BLD GHB EST-MCNC: 100 MG/DL
GLOBULIN SER CALC-MCNC: 3 G/DL (ref 2–4)
GLUCOSE SERPL-MCNC: 104 MG/DL (ref 65–100)
HBA1C MFR BLD: 5.1 % (ref 4–5.6)
HDLC SERPL-MCNC: 35 MG/DL
HDLC SERPL: 5.9 (ref 0–5)
LDLC SERPL CALC-MCNC: 128.8 MG/DL (ref 0–100)
POTASSIUM SERPL-SCNC: 4.1 MMOL/L (ref 3.5–5.1)
PROT SERPL-MCNC: 7.3 G/DL (ref 6.4–8.2)
SODIUM SERPL-SCNC: 139 MMOL/L (ref 136–145)
TRIGL SERPL-MCNC: 211 MG/DL
VLDLC SERPL CALC-MCNC: 42.2 MG/DL

## 2024-03-22 DIAGNOSIS — F41.0 PANIC DISORDER (EPISODIC PAROXYSMAL ANXIETY): ICD-10-CM

## 2024-03-22 RX ORDER — ALPRAZOLAM 1 MG/1
TABLET ORAL
Qty: 30 TABLET | Refills: 1 | Status: SHIPPED | OUTPATIENT
Start: 2024-03-22 | End: 2025-03-22

## 2024-03-22 NOTE — TELEPHONE ENCOUNTER
PCP: Beto Barnes APRN - NP    Last appt: 3/19/2024    Future Appointments   Date Time Provider Department Center   9/30/2024  8:00 AM Beto Barnes APRN - NP [facility] AMB       Requested Prescriptions     Pending Prescriptions Disp Refills    ALPRAZolam (XANAX) 1 MG tablet [Pharmacy Med Name: ALPRAZOLAM 1 MG TABLET] 30 tablet 1     Sig: TAKE 1/2-1 TABLET BY MOUTH THREE (3) TIMES DAILY AS NEEDED FOR ANXIETY. MAX DAILY AMOUNT: 3 MG.

## 2024-05-17 DIAGNOSIS — K21.9 GASTRO-ESOPHAGEAL REFLUX DISEASE WITHOUT ESOPHAGITIS: Primary | ICD-10-CM

## 2024-05-17 DIAGNOSIS — F41.0 PANIC DISORDER (EPISODIC PAROXYSMAL ANXIETY): ICD-10-CM

## 2024-05-17 RX ORDER — PANTOPRAZOLE SODIUM 40 MG/1
TABLET, DELAYED RELEASE ORAL
Qty: 90 TABLET | Refills: 1 | Status: SHIPPED | OUTPATIENT
Start: 2024-05-17

## 2024-05-20 RX ORDER — ALPRAZOLAM 1 MG/1
TABLET ORAL
Qty: 30 TABLET | Refills: 1 | Status: SHIPPED | OUTPATIENT
Start: 2024-05-20 | End: 2025-05-20

## 2024-05-22 ENCOUNTER — PATIENT MESSAGE (OUTPATIENT)
Facility: CLINIC | Age: 49
End: 2024-05-22

## 2024-05-22 DIAGNOSIS — K21.9 GASTRO-ESOPHAGEAL REFLUX DISEASE WITHOUT ESOPHAGITIS: Primary | ICD-10-CM

## 2024-05-24 RX ORDER — SUCRALFATE 1 G/1
1 TABLET ORAL
Qty: 120 TABLET | Refills: 3 | Status: SHIPPED | OUTPATIENT
Start: 2024-05-24

## 2024-05-24 NOTE — TELEPHONE ENCOUNTER
Whitney Glass LPN 5/24/2024 7:59 AM EDT      ----- Message -----  From: Alivia Luque  Sent: 5/23/2024 4:48 PM EDT  To: *  Subject: Acid reflux is popping up a little     Or can I take 2 a day?

## 2024-07-03 DIAGNOSIS — K21.9 GASTRO-ESOPHAGEAL REFLUX DISEASE WITHOUT ESOPHAGITIS: ICD-10-CM

## 2024-07-03 DIAGNOSIS — I10 ESSENTIAL (PRIMARY) HYPERTENSION: ICD-10-CM

## 2024-07-05 RX ORDER — VALSARTAN AND HYDROCHLOROTHIAZIDE 160; 25 MG/1; MG/1
1 TABLET ORAL DAILY
Qty: 90 TABLET | Refills: 1 | Status: SHIPPED | OUTPATIENT
Start: 2024-07-05

## 2024-07-05 RX ORDER — FAMOTIDINE 40 MG/1
40 TABLET, FILM COATED ORAL 2 TIMES DAILY
Qty: 180 TABLET | Refills: 1 | Status: SHIPPED | OUTPATIENT
Start: 2024-07-05

## 2024-07-05 RX ORDER — PANTOPRAZOLE SODIUM 40 MG/1
40 TABLET, DELAYED RELEASE ORAL DAILY
Qty: 90 TABLET | Refills: 1 | Status: SHIPPED | OUTPATIENT
Start: 2024-07-05

## 2024-07-05 NOTE — TELEPHONE ENCOUNTER
PCP: Beto Barnes APRN - NP    Last appt: 3/19/2024    Future Appointments   Date Time Provider Department Center   9/30/2024  8:00 AM Beto Barnes APRN - NP [facility]       Requested Prescriptions     Pending Prescriptions Disp Refills    valsartan-hydroCHLOROthiazide (DIOVAN-HCT) 160-25 MG per tablet 90 tablet 1     Sig: Take 1 tablet by mouth daily for blood pressure    famotidine (PEPCID) 40 MG tablet 180 tablet 1     Sig: Take 1 tablet by mouth 2 times daily    pantoprazole (PROTONIX) 40 MG tablet 90 tablet 1     Sig: Take 1 tablet by mouth daily

## 2024-07-20 DIAGNOSIS — F41.0 PANIC DISORDER (EPISODIC PAROXYSMAL ANXIETY): ICD-10-CM

## 2024-07-23 RX ORDER — ALPRAZOLAM 1 MG/1
TABLET ORAL
Qty: 90 TABLET | Refills: 1 | Status: SHIPPED | OUTPATIENT
Start: 2024-07-23 | End: 2025-07-23

## 2024-08-20 DIAGNOSIS — F41.0 PANIC DISORDER (EPISODIC PAROXYSMAL ANXIETY): ICD-10-CM

## 2024-08-20 DIAGNOSIS — R03.0 ELEVATED BLOOD-PRESSURE READING, WITHOUT DIAGNOSIS OF HYPERTENSION: ICD-10-CM

## 2024-08-20 RX ORDER — METOPROLOL SUCCINATE 50 MG/1
TABLET, EXTENDED RELEASE ORAL
Qty: 90 TABLET | Refills: 1 | Status: SHIPPED | OUTPATIENT
Start: 2024-08-20

## 2024-08-20 NOTE — TELEPHONE ENCOUNTER
RX refill request from the patient/pharmacy. Patient last seen 03- with labs, and next appt. scheduled for 09-  Requested Prescriptions     Pending Prescriptions Disp Refills    metoprolol succinate (TOPROL XL) 50 MG extended release tablet [Pharmacy Med Name: METOPROLOL SUCC ER 50 MG TAB] 30 tablet 5     Sig: TAKE 1 TABLET BY MOUTH EVERY DAY FOR HIGH BLOOD PRESSURE    .

## 2024-09-30 ENCOUNTER — OFFICE VISIT (OUTPATIENT)
Facility: CLINIC | Age: 49
End: 2024-09-30
Payer: COMMERCIAL

## 2024-09-30 VITALS
RESPIRATION RATE: 16 BRPM | WEIGHT: 225.2 LBS | BODY MASS INDEX: 33.36 KG/M2 | HEIGHT: 69 IN | SYSTOLIC BLOOD PRESSURE: 118 MMHG | HEART RATE: 68 BPM | TEMPERATURE: 97.9 F | DIASTOLIC BLOOD PRESSURE: 74 MMHG | OXYGEN SATURATION: 98 %

## 2024-09-30 DIAGNOSIS — Z12.5 SCREENING FOR MALIGNANT NEOPLASM OF PROSTATE: ICD-10-CM

## 2024-09-30 DIAGNOSIS — K21.9 GASTRO-ESOPHAGEAL REFLUX DISEASE WITHOUT ESOPHAGITIS: ICD-10-CM

## 2024-09-30 DIAGNOSIS — E78.2 MIXED HYPERLIPIDEMIA: ICD-10-CM

## 2024-09-30 DIAGNOSIS — F41.0 PANIC DISORDER (EPISODIC PAROXYSMAL ANXIETY): ICD-10-CM

## 2024-09-30 DIAGNOSIS — I10 ESSENTIAL HYPERTENSION: ICD-10-CM

## 2024-09-30 DIAGNOSIS — Z00.00 ROUTINE PHYSICAL EXAMINATION: Primary | ICD-10-CM

## 2024-09-30 LAB
ALBUMIN SERPL-MCNC: 4.4 G/DL (ref 3.5–5)
ALBUMIN/GLOB SERPL: 1.6 (ref 1.1–2.2)
ALP SERPL-CCNC: 80 U/L (ref 45–117)
ALT SERPL-CCNC: 24 U/L (ref 12–78)
ANION GAP SERPL CALC-SCNC: 3 MMOL/L (ref 2–12)
AST SERPL-CCNC: 19 U/L (ref 15–37)
BILIRUB SERPL-MCNC: 1 MG/DL (ref 0.2–1)
BUN SERPL-MCNC: 12 MG/DL (ref 6–20)
BUN/CREAT SERPL: 11 (ref 12–20)
CALCIUM SERPL-MCNC: 9.6 MG/DL (ref 8.5–10.1)
CHLORIDE SERPL-SCNC: 103 MMOL/L (ref 97–108)
CHOLEST SERPL-MCNC: 202 MG/DL
CO2 SERPL-SCNC: 32 MMOL/L (ref 21–32)
CREAT SERPL-MCNC: 1.07 MG/DL (ref 0.7–1.3)
ERYTHROCYTE [DISTWIDTH] IN BLOOD BY AUTOMATED COUNT: 12.6 % (ref 11.5–14.5)
GLOBULIN SER CALC-MCNC: 2.8 G/DL (ref 2–4)
GLUCOSE SERPL-MCNC: 102 MG/DL (ref 65–100)
HCT VFR BLD AUTO: 44.3 % (ref 36.6–50.3)
HDLC SERPL-MCNC: 32 MG/DL
HDLC SERPL: 6.3 (ref 0–5)
HGB BLD-MCNC: 15.5 G/DL (ref 12.1–17)
LDLC SERPL CALC-MCNC: 119.2 MG/DL (ref 0–100)
MCH RBC QN AUTO: 33.7 PG (ref 26–34)
MCHC RBC AUTO-ENTMCNC: 35 G/DL (ref 30–36.5)
MCV RBC AUTO: 96.3 FL (ref 80–99)
NRBC # BLD: 0 K/UL (ref 0–0.01)
NRBC BLD-RTO: 0 PER 100 WBC
PLATELET # BLD AUTO: 220 K/UL (ref 150–400)
PMV BLD AUTO: 11.7 FL (ref 8.9–12.9)
POTASSIUM SERPL-SCNC: 3.9 MMOL/L (ref 3.5–5.1)
PROT SERPL-MCNC: 7.2 G/DL (ref 6.4–8.2)
PSA SERPL-MCNC: 0.7 NG/ML (ref 0.01–4)
RBC # BLD AUTO: 4.6 M/UL (ref 4.1–5.7)
SODIUM SERPL-SCNC: 138 MMOL/L (ref 136–145)
TRIGL SERPL-MCNC: 254 MG/DL
VLDLC SERPL CALC-MCNC: 50.8 MG/DL
WBC # BLD AUTO: 6.6 K/UL (ref 4.1–11.1)

## 2024-09-30 PROCEDURE — 3078F DIAST BP <80 MM HG: CPT | Performed by: NURSE PRACTITIONER

## 2024-09-30 PROCEDURE — 3074F SYST BP LT 130 MM HG: CPT | Performed by: NURSE PRACTITIONER

## 2024-09-30 PROCEDURE — 99396 PREV VISIT EST AGE 40-64: CPT | Performed by: NURSE PRACTITIONER

## 2024-09-30 RX ORDER — PANTOPRAZOLE SODIUM 40 MG/1
40 TABLET, DELAYED RELEASE ORAL DAILY
Qty: 90 TABLET | Refills: 1 | Status: SHIPPED | OUTPATIENT
Start: 2024-09-30

## 2024-09-30 SDOH — ECONOMIC STABILITY: FOOD INSECURITY: WITHIN THE PAST 12 MONTHS, THE FOOD YOU BOUGHT JUST DIDN'T LAST AND YOU DIDN'T HAVE MONEY TO GET MORE.: NEVER TRUE

## 2024-09-30 SDOH — ECONOMIC STABILITY: FOOD INSECURITY: WITHIN THE PAST 12 MONTHS, YOU WORRIED THAT YOUR FOOD WOULD RUN OUT BEFORE YOU GOT MONEY TO BUY MORE.: NEVER TRUE

## 2024-09-30 SDOH — ECONOMIC STABILITY: INCOME INSECURITY: HOW HARD IS IT FOR YOU TO PAY FOR THE VERY BASICS LIKE FOOD, HOUSING, MEDICAL CARE, AND HEATING?: NOT HARD AT ALL

## 2024-09-30 NOTE — PROGRESS NOTES
Chief Complaint   Patient presents with    Annual Exam       SUBJECTIVE:    Alivia Luque is a 48 y.o. male who is here today for a routine physical exam as well as follow up appointment regarding current medical conditions including: Essential hypertension, mixed hyperlipidemia, GERD, and panic disorder.  He states he is feeling relatively well and denies any new or acute complaints at this time.    The patient continues to take a combination of valsartan/HCTZ and metoprolol succinate daily for blood pressure management.  His blood pressure remained stable and in good control overall.  He denies any adverse side effects of medication.  He has a history of mixed hyperlipidemia, but currently does not take medication to treat this.  He has been advised to focus on low-fat/low-cholesterol diet.  He denies any recent episodes of chest pain, chest pressure, shortness of breath, headaches, dizziness, blurred vision, palpitations, or syncope episodes.    He continues to take pantoprazole daily for management of his GERD.  His symptoms have remained stable.    He continues to have alprazolam available as needed for breakthrough anxiety/panic attacks.  He states he has not had to use this recently.          Current Outpatient Medications   Medication Sig Dispense Refill    pantoprazole (PROTONIX) 40 MG tablet Take 1 tablet by mouth daily 90 tablet 1    metoprolol succinate (TOPROL XL) 50 MG extended release tablet TAKE 1 TABLET BY MOUTH EVERY DAY FOR HIGH BLOOD PRESSURE 90 tablet 1    ALPRAZolam (XANAX) 1 MG tablet TAKE 1/2-1 TABLET BY MOUTH THREE (3) TIMES DAILY AS NEEDED FOR ANXIETY. MAX DAILY AMOUNT: 3 MG. 90 tablet 1    valsartan-hydroCHLOROthiazide (DIOVAN-HCT) 160-25 MG per tablet Take 1 tablet by mouth daily for blood pressure 90 tablet 1    famotidine (PEPCID) 40 MG tablet Take 1 tablet by mouth 2 times daily 180 tablet 1    sucralfate (CARAFATE) 1 GM tablet Take 1 tablet by mouth 4 times daily (before meals 
Alivia Luque is a 48 y.o. male     Chief Complaint   Patient presents with    Annual Exam       /74 (Site: Left Upper Arm, Position: Sitting, Cuff Size: Medium Adult)   Pulse 68   Temp 97.9 °F (36.6 °C) (Oral)   Resp 16   Ht 1.753 m (5' 9\")   Wt 102.2 kg (225 lb 3.2 oz)   SpO2 98%   BMI 33.26 kg/m²     Health Maintenance Due   Topic Date Due    Hepatitis B vaccine (1 of 3 - 19+ 3-dose series) Never done    DTaP/Tdap/Td vaccine (1 - Tdap) 06/25/2004    Colorectal Cancer Screen  Never done    Shingles vaccine (1 of 2) Never done    Flu vaccine (1) Never done    COVID-19 Vaccine (1 - 2023-24 season) Never done         \"Have you been to the ER, urgent care clinic since your last visit?  Hospitalized since your last visit?\"    NO    “Have you seen or consulted any other health care providers outside of [facility] since your last visit?”    NO    “Have you had a colorectal cancer screening such as a colonoscopy/FIT/Cologuard?    NO    No colonoscopy on file  No cologuard on file  No FIT/FOBT on file   No flexible sigmoidoscopy on file                      
For information on Fall & Injury Prevention, visit [email or web address]

## 2025-01-22 DIAGNOSIS — K21.9 GASTRO-ESOPHAGEAL REFLUX DISEASE WITHOUT ESOPHAGITIS: ICD-10-CM

## 2025-01-23 RX ORDER — FAMOTIDINE 40 MG/1
40 TABLET, FILM COATED ORAL 2 TIMES DAILY
Qty: 180 TABLET | Refills: 1 | Status: SHIPPED | OUTPATIENT
Start: 2025-01-23

## 2025-01-23 NOTE — TELEPHONE ENCOUNTER
PCP: Beto Barnes APRN - NP    Last appt: 9/30/2024    Future Appointments   Date Time Provider Department Center   4/2/2025  8:30 AM Beto Barnes APRN - NP [facility] DE[facility]       Requested Prescriptions     Pending Prescriptions Disp Refills    famotidine (PEPCID) 40 MG tablet [Pharmacy Med Name: FAMOTIDINE 40 MG TABLET] 180 tablet 1     Sig: TAKE 1 TABLET BY MOUTH TWICE A DAY

## 2025-01-30 DIAGNOSIS — I10 ESSENTIAL (PRIMARY) HYPERTENSION: ICD-10-CM

## 2025-01-30 RX ORDER — VALSARTAN AND HYDROCHLOROTHIAZIDE 160; 25 MG/1; MG/1
1 TABLET ORAL DAILY
Qty: 90 TABLET | Refills: 1 | Status: SHIPPED | OUTPATIENT
Start: 2025-01-30

## 2025-01-30 NOTE — TELEPHONE ENCOUNTER
PCP: Beto Barnes APRN - NP    Last appt: 9/30/2024    Future Appointments   Date Time Provider Department Center   4/2/2025  8:30 AM Beto Barnes APRN - NP [facility] DE[facility]       Requested Prescriptions     Pending Prescriptions Disp Refills    valsartan-hydroCHLOROthiazide (DIOVAN-HCT) 160-25 MG per tablet [Pharmacy Med Name: VALSARTAN-HCTZ 160-25 MG TAB] 30 tablet 5     Sig: TAKE 1 TABLET BY MOUTH EVERY DAY FOR BLOOD PRESSURE

## 2025-02-18 DIAGNOSIS — R03.0 ELEVATED BLOOD-PRESSURE READING, WITHOUT DIAGNOSIS OF HYPERTENSION: ICD-10-CM

## 2025-02-18 DIAGNOSIS — F41.0 PANIC DISORDER (EPISODIC PAROXYSMAL ANXIETY): ICD-10-CM

## 2025-02-18 RX ORDER — METOPROLOL SUCCINATE 50 MG/1
TABLET, EXTENDED RELEASE ORAL
Qty: 90 TABLET | Refills: 1 | Status: SHIPPED | OUTPATIENT
Start: 2025-02-18

## 2025-02-18 NOTE — TELEPHONE ENCOUNTER
PCP: Beto Barnes APRN - NP    Last appt: 9/30/2024    Future Appointments   Date Time Provider Department Center   4/2/2025  8:30 AM Beto Barnes APRN - NP [facility] DEP       Requested Prescriptions     Pending Prescriptions Disp Refills    metoprolol succinate (TOPROL XL) 50 MG extended release tablet [Pharmacy Med Name: METOPROLOL SUCC ER 50 MG TAB] 90 tablet 1     Sig: TAKE 1 TABLET BY MOUTH EVERY DAY FOR HIGH BLOOD PRESSURE

## 2025-03-04 DIAGNOSIS — F41.0 PANIC DISORDER (EPISODIC PAROXYSMAL ANXIETY): ICD-10-CM

## 2025-03-04 RX ORDER — ALPRAZOLAM 1 MG/1
TABLET ORAL
Qty: 90 TABLET | Refills: 2 | Status: SHIPPED | OUTPATIENT
Start: 2025-03-04 | End: 2026-03-04

## 2025-03-04 NOTE — TELEPHONE ENCOUNTER
PCP: Beto Barnes APRN - NP    Last appt: 9/30/2024    Future Appointments   Date Time Provider Department Center   4/2/2025  8:30 AM Beto Barnes APRN - NP [facility] DEP       Requested Prescriptions     Pending Prescriptions Disp Refills    ALPRAZolam (XANAX) 1 MG tablet [Pharmacy Med Name: ALPRAZOLAM 1 MG TABLET] 90 tablet      Sig: TAKE 1/2-1 TABLET BY MOUTH THREE (3) TIMES DAILY AS NEEDED FOR ANXIETY. MAX DAILY AMOUNT: 3 MG.

## 2025-03-05 RX ORDER — ALPRAZOLAM 1 MG/1
TABLET ORAL
Qty: 90 TABLET | Refills: 1 | OUTPATIENT
Start: 2025-03-05 | End: 2026-03-05

## 2025-03-30 SDOH — ECONOMIC STABILITY: INCOME INSECURITY: IN THE LAST 12 MONTHS, WAS THERE A TIME WHEN YOU WERE NOT ABLE TO PAY THE MORTGAGE OR RENT ON TIME?: NO

## 2025-03-30 SDOH — ECONOMIC STABILITY: TRANSPORTATION INSECURITY
IN THE PAST 12 MONTHS, HAS LACK OF TRANSPORTATION KEPT YOU FROM MEETINGS, WORK, OR FROM GETTING THINGS NEEDED FOR DAILY LIVING?: NO

## 2025-03-30 SDOH — ECONOMIC STABILITY: FOOD INSECURITY: WITHIN THE PAST 12 MONTHS, YOU WORRIED THAT YOUR FOOD WOULD RUN OUT BEFORE YOU GOT MONEY TO BUY MORE.: NEVER TRUE

## 2025-03-30 SDOH — ECONOMIC STABILITY: FOOD INSECURITY: WITHIN THE PAST 12 MONTHS, THE FOOD YOU BOUGHT JUST DIDN'T LAST AND YOU DIDN'T HAVE MONEY TO GET MORE.: NEVER TRUE

## 2025-03-30 SDOH — ECONOMIC STABILITY: TRANSPORTATION INSECURITY
IN THE PAST 12 MONTHS, HAS THE LACK OF TRANSPORTATION KEPT YOU FROM MEDICAL APPOINTMENTS OR FROM GETTING MEDICATIONS?: NO

## 2025-04-02 ENCOUNTER — OFFICE VISIT (OUTPATIENT)
Facility: CLINIC | Age: 50
End: 2025-04-02
Payer: COMMERCIAL

## 2025-04-02 VITALS
HEIGHT: 69 IN | RESPIRATION RATE: 16 BRPM | SYSTOLIC BLOOD PRESSURE: 122 MMHG | HEART RATE: 81 BPM | DIASTOLIC BLOOD PRESSURE: 70 MMHG | WEIGHT: 223.2 LBS | BODY MASS INDEX: 33.06 KG/M2 | TEMPERATURE: 98.1 F | OXYGEN SATURATION: 99 %

## 2025-04-02 DIAGNOSIS — E78.2 MIXED HYPERLIPIDEMIA: ICD-10-CM

## 2025-04-02 DIAGNOSIS — E66.811 CLASS 1 OBESITY DUE TO EXCESS CALORIES WITH SERIOUS COMORBIDITY AND BODY MASS INDEX (BMI) OF 32.0 TO 32.9 IN ADULT: ICD-10-CM

## 2025-04-02 DIAGNOSIS — K21.9 GASTRO-ESOPHAGEAL REFLUX DISEASE WITHOUT ESOPHAGITIS: ICD-10-CM

## 2025-04-02 DIAGNOSIS — F41.0 PANIC DISORDER (EPISODIC PAROXYSMAL ANXIETY): ICD-10-CM

## 2025-04-02 DIAGNOSIS — E66.09 CLASS 1 OBESITY DUE TO EXCESS CALORIES WITH SERIOUS COMORBIDITY AND BODY MASS INDEX (BMI) OF 32.0 TO 32.9 IN ADULT: ICD-10-CM

## 2025-04-02 DIAGNOSIS — G89.29 CHRONIC BILATERAL THORACIC BACK PAIN: ICD-10-CM

## 2025-04-02 DIAGNOSIS — M54.6 CHRONIC BILATERAL THORACIC BACK PAIN: ICD-10-CM

## 2025-04-02 DIAGNOSIS — I10 ESSENTIAL HYPERTENSION: Primary | ICD-10-CM

## 2025-04-02 PROCEDURE — 3074F SYST BP LT 130 MM HG: CPT | Performed by: NURSE PRACTITIONER

## 2025-04-02 PROCEDURE — 3078F DIAST BP <80 MM HG: CPT | Performed by: NURSE PRACTITIONER

## 2025-04-02 PROCEDURE — 99214 OFFICE O/P EST MOD 30 MIN: CPT | Performed by: NURSE PRACTITIONER

## 2025-04-02 RX ORDER — ALPRAZOLAM 1 MG/1
1 TABLET ORAL 3 TIMES DAILY PRN
Qty: 90 TABLET | Refills: 0 | Status: SHIPPED | OUTPATIENT
Start: 2025-04-02 | End: 2026-04-02

## 2025-04-02 ASSESSMENT — PATIENT HEALTH QUESTIONNAIRE - PHQ9
SUM OF ALL RESPONSES TO PHQ QUESTIONS 1-9: 0
SUM OF ALL RESPONSES TO PHQ QUESTIONS 1-9: 0
1. LITTLE INTEREST OR PLEASURE IN DOING THINGS: NOT AT ALL
2. FEELING DOWN, DEPRESSED OR HOPELESS: NOT AT ALL
SUM OF ALL RESPONSES TO PHQ QUESTIONS 1-9: 0
SUM OF ALL RESPONSES TO PHQ QUESTIONS 1-9: 0

## 2025-04-02 NOTE — PROGRESS NOTES
Chief Complaint   Patient presents with    Hypertension     4M       SUBJECTIVE:    Alivia Luque is a 49 y.o. male who is here today for a follow up appointment regarding current medical conditions including: Essential hypertension, mixed hyperlipidemia, paroxysmal anxiety, and GERD.  He would also like to discuss some ongoing back pain issues.    The patient remains on metoprolol succinate and valsartan/HCTZ daily for management of his hypertension.  His blood pressure has remained stable and in good control overall.  He denies any adverse side effects of the medication.  He has a history of mixed hyperlipidemia, but does not currently take medication for this.  He has been told to focus on a low-fat/low-cholesterol diet.  He denies any recent episodes of chest pain, chest pressure, shortness of breath, headaches, dizziness, blurred vision, palpitations, or syncope episodes.    He continues to take famotidine and pantoprazole for management of his GERD.  His symptoms have been stable and well-controlled with use.  He remains on alprazolam to be used up to 3 times a day for management of his paroxysmal anxiety.  He states the medication is helpful and effective overall.    Additionally, he has been experiencing some mid back pain off and on for the past several months.  He denies any rash, bruising, redness, or swelling.  He has had no trauma or work-related injury.  He denies any hematuria.  He states the pain comes and goes, sometimes lasting for hours, or minutes at a time.  He states he sometimes has some resolution with stretching and drinking water.  He is concerned about possible kidney issues such as masses or tumors.      Current Outpatient Medications   Medication Sig Dispense Refill    metoprolol succinate (TOPROL XL) 50 MG extended release tablet TAKE 1 TABLET BY MOUTH EVERY DAY FOR HIGH BLOOD PRESSURE 90 tablet 1    valsartan-hydroCHLOROthiazide (DIOVAN-HCT) 160-25 MG per tablet TAKE 1 TABLET BY

## 2025-04-02 NOTE — PROGRESS NOTES
Alivia Luque is a 49 y.o. male     Chief Complaint   Patient presents with    Hypertension     4M       /70   Pulse 81   Temp 98.1 °F (36.7 °C) (Oral)   Resp 16   Ht 1.753 m (5' 9\")   Wt 101.2 kg (223 lb 3.2 oz)   SpO2 99%   BMI 32.96 kg/m²     Health Maintenance Due   Topic Date Due    Hepatitis B vaccine (1 of 3 - 19+ 3-dose series) Never done    DTaP/Tdap/Td vaccine (1 - Tdap) 06/25/2004    Colorectal Cancer Screen  Never done    Shingles vaccine (1 of 2) Never done    Pneumococcal 50+ years Vaccine (1 of 1 - PCV) Never done    COVID-19 Vaccine (1 - 2024-25 season) Never done    Depression Screen  03/19/2025         \"Have you been to the ER, urgent care clinic since your last visit?  Hospitalized since your last visit?\"    NO    “Have you seen or consulted any other health care providers outside of [facility] since your last visit?”    NO    “Have you had a colorectal cancer screening such as a colonoscopy/FIT/Cologuard?    NO    No colonoscopy on file  No cologuard on file  No FIT/FOBT on file   No flexible sigmoidoscopy on file

## 2025-04-03 ENCOUNTER — RESULTS FOLLOW-UP (OUTPATIENT)
Facility: CLINIC | Age: 50
End: 2025-04-03

## 2025-04-03 ENCOUNTER — HOSPITAL ENCOUNTER (OUTPATIENT)
Facility: HOSPITAL | Age: 50
Discharge: HOME | End: 2025-04-03
Payer: COMMERCIAL

## 2025-04-03 DIAGNOSIS — G89.29 CHRONIC BILATERAL THORACIC BACK PAIN: ICD-10-CM

## 2025-04-03 DIAGNOSIS — M54.6 CHRONIC BILATERAL THORACIC BACK PAIN: ICD-10-CM

## 2025-04-03 LAB
ALBUMIN SERPL-MCNC: 4.4 G/DL (ref 3.5–5)
ALBUMIN/GLOB SERPL: 1.4 (ref 1.1–2.2)
ALP SERPL-CCNC: 84 U/L (ref 45–117)
ALT SERPL-CCNC: 30 U/L (ref 12–78)
ANION GAP SERPL CALC-SCNC: 3 MMOL/L (ref 2–12)
AST SERPL-CCNC: 20 U/L (ref 15–37)
BASOPHILS # BLD: 0.05 K/UL (ref 0–0.1)
BASOPHILS NFR BLD: 0.7 % (ref 0–1)
BILIRUB SERPL-MCNC: 0.7 MG/DL (ref 0.2–1)
BUN SERPL-MCNC: 17 MG/DL (ref 6–20)
BUN/CREAT SERPL: 17 (ref 12–20)
CALCIUM SERPL-MCNC: 10.2 MG/DL (ref 8.5–10.1)
CHLORIDE SERPL-SCNC: 104 MMOL/L (ref 97–108)
CHOLEST SERPL-MCNC: 196 MG/DL
CO2 SERPL-SCNC: 32 MMOL/L (ref 21–32)
CREAT SERPL-MCNC: 1 MG/DL (ref 0.7–1.3)
DIFFERENTIAL METHOD BLD: NORMAL
EOSINOPHIL # BLD: 0.07 K/UL (ref 0–0.4)
EOSINOPHIL NFR BLD: 1 % (ref 0–7)
ERYTHROCYTE [DISTWIDTH] IN BLOOD BY AUTOMATED COUNT: 12.2 % (ref 11.5–14.5)
GLOBULIN SER CALC-MCNC: 3.1 G/DL (ref 2–4)
GLUCOSE SERPL-MCNC: 117 MG/DL (ref 65–100)
HCT VFR BLD AUTO: 44.3 % (ref 36.6–50.3)
HDLC SERPL-MCNC: 37 MG/DL
HDLC SERPL: 5.3 (ref 0–5)
HGB BLD-MCNC: 15.5 G/DL (ref 12.1–17)
IMM GRANULOCYTES # BLD AUTO: 0.02 K/UL (ref 0–0.04)
IMM GRANULOCYTES NFR BLD AUTO: 0.3 % (ref 0–0.5)
LDLC SERPL CALC-MCNC: 129 MG/DL (ref 0–100)
LYMPHOCYTES # BLD: 1.57 K/UL (ref 0.8–3.5)
LYMPHOCYTES NFR BLD: 21.4 % (ref 12–49)
MCH RBC QN AUTO: 32.9 PG (ref 26–34)
MCHC RBC AUTO-ENTMCNC: 35 G/DL (ref 30–36.5)
MCV RBC AUTO: 94.1 FL (ref 80–99)
MONOCYTES # BLD: 0.53 K/UL (ref 0–1)
MONOCYTES NFR BLD: 7.2 % (ref 5–13)
NEUTS SEG # BLD: 5.08 K/UL (ref 1.8–8)
NEUTS SEG NFR BLD: 69.4 % (ref 32–75)
NRBC # BLD: 0 K/UL (ref 0–0.01)
NRBC BLD-RTO: 0 PER 100 WBC
PLATELET # BLD AUTO: 256 K/UL (ref 150–400)
PMV BLD AUTO: 11.7 FL (ref 8.9–12.9)
POTASSIUM SERPL-SCNC: 3.8 MMOL/L (ref 3.5–5.1)
PROT SERPL-MCNC: 7.5 G/DL (ref 6.4–8.2)
RBC # BLD AUTO: 4.71 M/UL (ref 4.1–5.7)
SODIUM SERPL-SCNC: 139 MMOL/L (ref 136–145)
TRIGL SERPL-MCNC: 150 MG/DL
VLDLC SERPL CALC-MCNC: 30 MG/DL
WBC # BLD AUTO: 7.3 K/UL (ref 4.1–11.1)

## 2025-04-03 PROCEDURE — 76770 US EXAM ABDO BACK WALL COMP: CPT

## 2025-04-07 ENCOUNTER — RESULTS FOLLOW-UP (OUTPATIENT)
Facility: CLINIC | Age: 50
End: 2025-04-07

## 2025-07-11 DIAGNOSIS — K21.9 GASTRO-ESOPHAGEAL REFLUX DISEASE WITHOUT ESOPHAGITIS: ICD-10-CM

## 2025-07-11 RX ORDER — FAMOTIDINE 40 MG/1
40 TABLET, FILM COATED ORAL 2 TIMES DAILY
Qty: 180 TABLET | Refills: 1 | Status: SHIPPED | OUTPATIENT
Start: 2025-07-11

## 2025-07-11 NOTE — TELEPHONE ENCOUNTER
PCP: Beto Barnes APRN - NP    Last appt: 4/2/2025    Future Appointments   Date Time Provider Department Center   10/3/2025  9:30 AM Beto Barnes APRN - NP [facility] DEP       Requested Prescriptions     Pending Prescriptions Disp Refills    famotidine (PEPCID) 40 MG tablet [Pharmacy Med Name: FAMOTIDINE 40 MG TABLET] 180 tablet 1     Sig: TAKE 1 TABLET BY MOUTH TWICE A DAY

## 2025-07-20 DIAGNOSIS — K21.9 GASTRO-ESOPHAGEAL REFLUX DISEASE WITHOUT ESOPHAGITIS: ICD-10-CM

## 2025-07-20 DIAGNOSIS — R03.0 ELEVATED BLOOD-PRESSURE READING, WITHOUT DIAGNOSIS OF HYPERTENSION: ICD-10-CM

## 2025-07-20 DIAGNOSIS — F41.0 PANIC DISORDER (EPISODIC PAROXYSMAL ANXIETY): ICD-10-CM

## 2025-07-20 DIAGNOSIS — I10 ESSENTIAL (PRIMARY) HYPERTENSION: ICD-10-CM

## 2025-07-21 RX ORDER — PANTOPRAZOLE SODIUM 40 MG/1
40 TABLET, DELAYED RELEASE ORAL DAILY
Qty: 90 TABLET | Refills: 1 | Status: SHIPPED | OUTPATIENT
Start: 2025-07-21

## 2025-07-21 RX ORDER — VALSARTAN AND HYDROCHLOROTHIAZIDE 160; 25 MG/1; MG/1
1 TABLET ORAL DAILY
Qty: 90 TABLET | Refills: 1 | Status: SHIPPED | OUTPATIENT
Start: 2025-07-21

## 2025-07-21 RX ORDER — METOPROLOL SUCCINATE 50 MG/1
50 TABLET, EXTENDED RELEASE ORAL DAILY
Qty: 90 TABLET | Refills: 1 | Status: SHIPPED | OUTPATIENT
Start: 2025-07-21

## 2025-07-21 NOTE — TELEPHONE ENCOUNTER
PCP: Beto Barnes APRN - NP    Last appt: 4/2/2025    Future Appointments   Date Time Provider Department Center   10/3/2025  9:30 AM Beto Barnes APRN - NP [facility] DEP       Requested Prescriptions     Pending Prescriptions Disp Refills    valsartan-hydroCHLOROthiazide (DIOVAN-HCT) 160-25 MG per tablet [Pharmacy Med Name: VALSARTAN-HCTZ 160-25 MG TAB] 90 tablet 1     Sig: TAKE 1 TABLET BY MOUTH EVERY DAY FOR BLOOD PRESSURE    pantoprazole (PROTONIX) 40 MG tablet [Pharmacy Med Name: PANTOPRAZOLE SOD DR 40 MG TAB] 90 tablet 1     Sig: TAKE 1 TABLET BY MOUTH EVERY DAY    metoprolol succinate (TOPROL XL) 50 MG extended release tablet [Pharmacy Med Name: METOPROLOL SUCC ER 50 MG TAB] 90 tablet 1     Sig: TAKE 1 TABLET BY MOUTH EVERY DAY FOR HIGH BLOOD PRESSURE